# Patient Record
Sex: MALE | Race: WHITE | URBAN - METROPOLITAN AREA
[De-identification: names, ages, dates, MRNs, and addresses within clinical notes are randomized per-mention and may not be internally consistent; named-entity substitution may affect disease eponyms.]

---

## 2019-02-14 ENCOUNTER — OFFICE VISIT (OUTPATIENT)
Dept: INTERNAL MEDICINE CLINIC | Age: 69
End: 2019-02-14
Payer: COMMERCIAL

## 2019-02-14 VITALS
HEIGHT: 74 IN | DIASTOLIC BLOOD PRESSURE: 80 MMHG | HEART RATE: 76 BPM | TEMPERATURE: 97.7 F | OXYGEN SATURATION: 97 % | WEIGHT: 218 LBS | BODY MASS INDEX: 27.98 KG/M2 | SYSTOLIC BLOOD PRESSURE: 130 MMHG

## 2019-02-14 DIAGNOSIS — S49.92XD SHOULDER INJURY, LEFT, SUBSEQUENT ENCOUNTER: ICD-10-CM

## 2019-02-14 DIAGNOSIS — I73.9 PAD (PERIPHERAL ARTERY DISEASE) (HCC): ICD-10-CM

## 2019-02-14 DIAGNOSIS — F17.200 SMOKER: ICD-10-CM

## 2019-02-14 DIAGNOSIS — I10 ESSENTIAL HYPERTENSION: Primary | ICD-10-CM

## 2019-02-14 DIAGNOSIS — Z91.81 AT HIGH RISK FOR FALLS: ICD-10-CM

## 2019-02-14 DIAGNOSIS — E78.00 HYPERCHOLESTEREMIA: ICD-10-CM

## 2019-02-14 PROCEDURE — G8427 DOCREV CUR MEDS BY ELIG CLIN: HCPCS | Performed by: INTERNAL MEDICINE

## 2019-02-14 PROCEDURE — 1123F ACP DISCUSS/DSCN MKR DOCD: CPT | Performed by: INTERNAL MEDICINE

## 2019-02-14 PROCEDURE — 1101F PT FALLS ASSESS-DOCD LE1/YR: CPT | Performed by: INTERNAL MEDICINE

## 2019-02-14 PROCEDURE — G8484 FLU IMMUNIZE NO ADMIN: HCPCS | Performed by: INTERNAL MEDICINE

## 2019-02-14 PROCEDURE — 4004F PT TOBACCO SCREEN RCVD TLK: CPT | Performed by: INTERNAL MEDICINE

## 2019-02-14 PROCEDURE — 4040F PNEUMOC VAC/ADMIN/RCVD: CPT | Performed by: INTERNAL MEDICINE

## 2019-02-14 PROCEDURE — 3017F COLORECTAL CA SCREEN DOC REV: CPT | Performed by: INTERNAL MEDICINE

## 2019-02-14 PROCEDURE — 99203 OFFICE O/P NEW LOW 30 MIN: CPT | Performed by: INTERNAL MEDICINE

## 2019-02-14 PROCEDURE — G8419 CALC BMI OUT NRM PARAM NOF/U: HCPCS | Performed by: INTERNAL MEDICINE

## 2019-02-14 RX ORDER — AMLODIPINE BESYLATE 10 MG/1
10 TABLET ORAL DAILY
Qty: 90 TABLET | Refills: 1 | Status: SHIPPED | OUTPATIENT
Start: 2019-02-14 | End: 2019-08-08 | Stop reason: DRUGHIGH

## 2019-02-14 RX ORDER — LISINOPRIL 20 MG/1
20 TABLET ORAL DAILY
COMMUNITY
End: 2019-02-14 | Stop reason: SDUPTHER

## 2019-02-14 RX ORDER — LISINOPRIL 20 MG/1
20 TABLET ORAL DAILY
Qty: 90 TABLET | Refills: 1 | Status: SHIPPED | OUTPATIENT
Start: 2019-02-14 | End: 2019-11-13 | Stop reason: SDUPTHER

## 2019-02-14 RX ORDER — ATORVASTATIN CALCIUM 80 MG/1
80 TABLET, FILM COATED ORAL DAILY
Qty: 90 TABLET | Refills: 1 | Status: SHIPPED | OUTPATIENT
Start: 2019-02-14 | End: 2020-02-03 | Stop reason: SDUPTHER

## 2019-02-14 RX ORDER — VARENICLINE TARTRATE 1 MG/1
1 TABLET, FILM COATED ORAL 2 TIMES DAILY
Qty: 180 TABLET | Refills: 1 | Status: SHIPPED | OUTPATIENT
Start: 2019-02-14 | End: 2019-08-08

## 2019-02-14 RX ORDER — AMLODIPINE BESYLATE 10 MG/1
10 TABLET ORAL DAILY
COMMUNITY
End: 2019-02-14 | Stop reason: SDUPTHER

## 2019-02-14 RX ORDER — ATORVASTATIN CALCIUM 80 MG/1
80 TABLET, FILM COATED ORAL DAILY
COMMUNITY
End: 2019-02-14 | Stop reason: SDUPTHER

## 2019-02-14 ASSESSMENT — ENCOUNTER SYMPTOMS
ABDOMINAL PAIN: 0
COUGH: 0
NAUSEA: 0
DIARRHEA: 0
BLOOD IN STOOL: 0
BACK PAIN: 1
VOMITING: 0
CONSTIPATION: 0
SHORTNESS OF BREATH: 0
TROUBLE SWALLOWING: 0

## 2019-02-14 ASSESSMENT — PATIENT HEALTH QUESTIONNAIRE - PHQ9
2. FEELING DOWN, DEPRESSED OR HOPELESS: 0
SUM OF ALL RESPONSES TO PHQ QUESTIONS 1-9: 0
SUM OF ALL RESPONSES TO PHQ9 QUESTIONS 1 & 2: 0
SUM OF ALL RESPONSES TO PHQ QUESTIONS 1-9: 0
1. LITTLE INTEREST OR PLEASURE IN DOING THINGS: 0

## 2019-02-20 RX ORDER — FLUOCINOLONE ACETONIDE 0.1 MG/ML
SOLUTION TOPICAL
Qty: 60 ML | Refills: 1 | Status: SHIPPED | OUTPATIENT
Start: 2019-02-20 | End: 2020-08-19 | Stop reason: SDUPTHER

## 2019-08-08 ENCOUNTER — OFFICE VISIT (OUTPATIENT)
Dept: INTERNAL MEDICINE CLINIC | Age: 69
End: 2019-08-08
Payer: COMMERCIAL

## 2019-08-08 VITALS
SYSTOLIC BLOOD PRESSURE: 90 MMHG | RESPIRATION RATE: 16 BRPM | HEART RATE: 64 BPM | TEMPERATURE: 98.2 F | BODY MASS INDEX: 25.91 KG/M2 | DIASTOLIC BLOOD PRESSURE: 60 MMHG | WEIGHT: 201.8 LBS | OXYGEN SATURATION: 96 %

## 2019-08-08 DIAGNOSIS — F17.200 SMOKER: ICD-10-CM

## 2019-08-08 DIAGNOSIS — E78.00 HYPERCHOLESTEREMIA: ICD-10-CM

## 2019-08-08 DIAGNOSIS — I10 ESSENTIAL HYPERTENSION: ICD-10-CM

## 2019-08-08 DIAGNOSIS — Z12.11 COLON CANCER SCREENING: ICD-10-CM

## 2019-08-08 DIAGNOSIS — L73.9 FOLLICULITIS: ICD-10-CM

## 2019-08-08 DIAGNOSIS — Z12.5 PROSTATE CANCER SCREENING: ICD-10-CM

## 2019-08-08 DIAGNOSIS — R39.15 BENIGN PROSTATIC HYPERPLASIA (BPH) WITH URINARY URGENCY: Primary | ICD-10-CM

## 2019-08-08 DIAGNOSIS — R31.29 MICROSCOPIC HEMATURIA: ICD-10-CM

## 2019-08-08 DIAGNOSIS — N40.1 BENIGN PROSTATIC HYPERPLASIA (BPH) WITH URINARY URGENCY: Primary | ICD-10-CM

## 2019-08-08 DIAGNOSIS — Z11.59 ENCOUNTER FOR HEPATITIS C SCREENING TEST FOR LOW RISK PATIENT: ICD-10-CM

## 2019-08-08 LAB
A/G RATIO: 1.6 (ref 1.1–2.2)
ALBUMIN SERPL-MCNC: 4.4 G/DL (ref 3.4–5)
ALP BLD-CCNC: 83 U/L (ref 40–129)
ALT SERPL-CCNC: 10 U/L (ref 10–40)
ANION GAP SERPL CALCULATED.3IONS-SCNC: 18 MMOL/L (ref 3–16)
AST SERPL-CCNC: 14 U/L (ref 15–37)
BILIRUB SERPL-MCNC: 1 MG/DL (ref 0–1)
BILIRUBIN, POC: ABNORMAL
BLOOD URINE, POC: ABNORMAL
BUN BLDV-MCNC: 23 MG/DL (ref 7–20)
CALCIUM SERPL-MCNC: 9.7 MG/DL (ref 8.3–10.6)
CHLORIDE BLD-SCNC: 101 MMOL/L (ref 99–110)
CHOLESTEROL, TOTAL: 160 MG/DL (ref 0–199)
CLARITY, POC: CLEAR
CO2: 21 MMOL/L (ref 21–32)
COLOR, POC: ABNORMAL
CREAT SERPL-MCNC: 1.4 MG/DL (ref 0.8–1.3)
GFR AFRICAN AMERICAN: >60
GFR NON-AFRICAN AMERICAN: 51
GLOBULIN: 2.8 G/DL
GLUCOSE BLD-MCNC: 98 MG/DL (ref 70–99)
GLUCOSE URINE, POC: ABNORMAL
HDLC SERPL-MCNC: 34 MG/DL (ref 40–60)
HEPATITIS C ANTIBODY INTERPRETATION: NORMAL
KETONES, POC: ABNORMAL
LDL CHOLESTEROL CALCULATED: 92 MG/DL
LEUKOCYTE EST, POC: ABNORMAL
NITRITE, POC: ABNORMAL
PH, POC: 6
POTASSIUM SERPL-SCNC: 3.9 MMOL/L (ref 3.5–5.1)
PROTEIN, POC: ABNORMAL
SODIUM BLD-SCNC: 140 MMOL/L (ref 136–145)
SPECIFIC GRAVITY, POC: 1.02
TOTAL PROTEIN: 7.2 G/DL (ref 6.4–8.2)
TRIGL SERPL-MCNC: 172 MG/DL (ref 0–150)
UROBILINOGEN, POC: 0.2
VLDLC SERPL CALC-MCNC: 34 MG/DL

## 2019-08-08 PROCEDURE — 4004F PT TOBACCO SCREEN RCVD TLK: CPT | Performed by: INTERNAL MEDICINE

## 2019-08-08 PROCEDURE — 3017F COLORECTAL CA SCREEN DOC REV: CPT | Performed by: INTERNAL MEDICINE

## 2019-08-08 PROCEDURE — 1123F ACP DISCUSS/DSCN MKR DOCD: CPT | Performed by: INTERNAL MEDICINE

## 2019-08-08 PROCEDURE — 99214 OFFICE O/P EST MOD 30 MIN: CPT | Performed by: INTERNAL MEDICINE

## 2019-08-08 PROCEDURE — 81002 URINALYSIS NONAUTO W/O SCOPE: CPT | Performed by: INTERNAL MEDICINE

## 2019-08-08 PROCEDURE — 4040F PNEUMOC VAC/ADMIN/RCVD: CPT | Performed by: INTERNAL MEDICINE

## 2019-08-08 PROCEDURE — G8427 DOCREV CUR MEDS BY ELIG CLIN: HCPCS | Performed by: INTERNAL MEDICINE

## 2019-08-08 PROCEDURE — G8419 CALC BMI OUT NRM PARAM NOF/U: HCPCS | Performed by: INTERNAL MEDICINE

## 2019-08-08 RX ORDER — NYSTATIN 100000 [USP'U]/G
POWDER TOPICAL
Qty: 60 G | Refills: 0 | Status: SHIPPED | OUTPATIENT
Start: 2019-08-08 | End: 2020-08-19

## 2019-08-08 RX ORDER — SULFAMETHOXAZOLE AND TRIMETHOPRIM 800; 160 MG/1; MG/1
1 TABLET ORAL 2 TIMES DAILY
Qty: 14 TABLET | Refills: 0 | Status: SHIPPED | OUTPATIENT
Start: 2019-08-08 | End: 2019-08-15

## 2019-08-08 RX ORDER — TAMSULOSIN HYDROCHLORIDE 0.4 MG/1
0.4 CAPSULE ORAL DAILY
Qty: 90 CAPSULE | Refills: 1 | Status: SHIPPED | OUTPATIENT
Start: 2019-08-08 | End: 2020-02-03 | Stop reason: SDUPTHER

## 2019-08-08 RX ORDER — TAMSULOSIN HYDROCHLORIDE 0.4 MG/1
0.4 CAPSULE ORAL DAILY
Qty: 90 CAPSULE | Refills: 1 | Status: SHIPPED | OUTPATIENT
Start: 2019-08-08 | End: 2019-08-08 | Stop reason: SDUPTHER

## 2019-08-08 RX ORDER — AMLODIPINE BESYLATE 5 MG/1
5 TABLET ORAL DAILY
Qty: 90 TABLET | Refills: 1 | Status: SHIPPED | OUTPATIENT
Start: 2019-08-08 | End: 2020-02-03 | Stop reason: SDUPTHER

## 2019-08-08 ASSESSMENT — ENCOUNTER SYMPTOMS
DIARRHEA: 0
COUGH: 0
SHORTNESS OF BREATH: 0
NAUSEA: 0
ABDOMINAL PAIN: 0
VOMITING: 0

## 2019-08-08 NOTE — PROGRESS NOTES
SUBJECTIVE:  Dave Vela is a 81 y.o. male being evaluated for:    Chief Complaint   Patient presents with    Urinary Frequency     Started about 2-3 months , Nocturia x6-8 and during the day sometimes has trouble holding his urine.  Sore     started about 2-3 months getting sores along his lower abdomen. stings and itches. HPI   Cannot hold it  Has to pee   NOrmal stream  Getting up 6 to 8 times at night  No dysuria NO stomach or back pain NO nausea or vomiting     Getting sores along lower abdoment for 2 to 3 months  Feels ingrown hairs but will not go away  Pants rub it and working all the time  EIther working or fishing     No Known Allergies  Current Outpatient Medications   Medication Sig Dispense Refill    sulfamethoxazole-trimethoprim (BACTRIM DS;SEPTRA DS) 800-160 MG per tablet Take 1 tablet by mouth 2 times daily for 7 days 14 tablet 0    nystatin (MYCOSTATIN) 932873 UNIT/GM powder Apply 3 times daily. 60 g 0    amLODIPine (NORVASC) 5 MG tablet Take 1 tablet by mouth daily 90 tablet 1    tamsulosin (FLOMAX) 0.4 MG capsule Take 1 capsule by mouth daily 90 capsule 1    fluocinolone acetonide (SYNALAR) 0.01 % external solution Apply topically to affected areas 2 times daily as needed 60 mL 1    aspirin 81 MG tablet Take 8,162 mg by mouth daily      atorvastatin (LIPITOR) 80 MG tablet Take 1 tablet by mouth daily 90 tablet 1    lisinopril (PRINIVIL;ZESTRIL) 20 MG tablet Take 1 tablet by mouth daily 90 tablet 1     No current facility-administered medications for this visit.           Social History     Socioeconomic History    Marital status: Single     Spouse name: Not on file    Number of children: Not on file    Years of education: Not on file    Highest education level: Not on file   Occupational History    Not on file   Social Needs    Financial resource strain: Not on file    Food insecurity:     Worry: Not on file     Inability: Not on file    Transportation needs: 3 times daily. Essential hypertension blood pressure is low lower dose  -     Comprehensive Metabolic Panel  -     amLODIPine (NORVASC) 5 MG tablet; Take 1 tablet by mouth daily    Hypercholesteremia  -     Lipid Panel    Microscopic hematuria  -     Urine Culture    Smoker  Comments:  cutting down Down to 5 a day  Trying hard to quit  Brother recently  of emphysema     Encounter for hepatitis C screening test for low risk patient  -     Hepatitis C Antibody    Colon cancer screening  -     Colrita (For External Results Only); Future        Orders Placed This Encounter   Medications    sulfamethoxazole-trimethoprim (BACTRIM DS;SEPTRA DS) 800-160 MG per tablet     Sig: Take 1 tablet by mouth 2 times daily for 7 days     Dispense:  14 tablet     Refill:  0    nystatin (MYCOSTATIN) 724936 UNIT/GM powder     Sig: Apply 3 times daily. Dispense:  60 g     Refill:  0    DISCONTD: tamsulosin (FLOMAX) 0.4 MG capsule     Sig: Take 1 capsule by mouth daily     Dispense:  90 capsule     Refill:  1    amLODIPine (NORVASC) 5 MG tablet     Sig: Take 1 tablet by mouth daily     Dispense:  90 tablet     Refill:  1    tamsulosin (FLOMAX) 0.4 MG capsule     Sig: Take 1 capsule by mouth daily     Dispense:  90 capsule     Refill:  1        Return in about 6 months (around 2020), or if symptoms worsen or fail to improve. Patient Instructions   Please call your pharmacy if you need any refills of your medication(s). Please call our office at [phone] if you don't hear from us about your test results. Bring an accurate list of your medications with you at every appointment to ensure that we have the correct information.     Our office hours are: Monday - Friday 8:30 am- 5 pm    Phone lines turn on at 8:30 am

## 2019-08-09 LAB — PROSTATE SPECIFIC ANTIGEN: 1.79 NG/ML (ref 0–4)

## 2019-08-10 DIAGNOSIS — N28.9 RENAL INSUFFICIENCY: Primary | ICD-10-CM

## 2019-08-10 LAB — URINE CULTURE, ROUTINE: NORMAL

## 2019-08-12 PROBLEM — E78.00 HYPERCHOLESTEREMIA: Status: ACTIVE | Noted: 2019-08-12

## 2019-08-12 PROBLEM — N40.1 BENIGN PROSTATIC HYPERPLASIA (BPH) WITH URINARY URGENCY: Status: ACTIVE | Noted: 2019-08-12

## 2019-08-12 PROBLEM — F17.200 SMOKER: Status: ACTIVE | Noted: 2019-08-12

## 2019-08-12 PROBLEM — R39.15 BENIGN PROSTATIC HYPERPLASIA (BPH) WITH URINARY URGENCY: Status: ACTIVE | Noted: 2019-08-12

## 2019-08-12 PROBLEM — I10 ESSENTIAL HYPERTENSION: Status: ACTIVE | Noted: 2019-08-12

## 2019-08-28 ENCOUNTER — TELEPHONE (OUTPATIENT)
Dept: INTERNAL MEDICINE CLINIC | Age: 69
End: 2019-08-28

## 2019-09-18 ENCOUNTER — TELEPHONE (OUTPATIENT)
Dept: INTERNAL MEDICINE CLINIC | Age: 69
End: 2019-09-18

## 2019-11-13 DIAGNOSIS — I10 ESSENTIAL HYPERTENSION: ICD-10-CM

## 2019-11-13 RX ORDER — LISINOPRIL 20 MG/1
TABLET ORAL
Qty: 90 TABLET | Refills: 1 | Status: SHIPPED | OUTPATIENT
Start: 2019-11-13 | End: 2020-04-02

## 2020-02-03 RX ORDER — AMLODIPINE BESYLATE 5 MG/1
5 TABLET ORAL DAILY
Qty: 90 TABLET | Refills: 1 | Status: SHIPPED | OUTPATIENT
Start: 2020-02-03 | End: 2020-08-05

## 2020-02-03 RX ORDER — TAMSULOSIN HYDROCHLORIDE 0.4 MG/1
CAPSULE ORAL
Qty: 90 CAPSULE | Refills: 1 | OUTPATIENT
Start: 2020-02-03

## 2020-02-03 RX ORDER — TAMSULOSIN HYDROCHLORIDE 0.4 MG/1
0.4 CAPSULE ORAL DAILY
Qty: 90 CAPSULE | Refills: 1 | Status: SHIPPED | OUTPATIENT
Start: 2020-02-03 | End: 2020-08-04 | Stop reason: SDUPTHER

## 2020-02-03 RX ORDER — ATORVASTATIN CALCIUM 80 MG/1
TABLET, FILM COATED ORAL
Qty: 90 TABLET | Refills: 1 | OUTPATIENT
Start: 2020-02-03

## 2020-02-03 RX ORDER — ATORVASTATIN CALCIUM 80 MG/1
80 TABLET, FILM COATED ORAL DAILY
Qty: 90 TABLET | Refills: 1 | Status: SHIPPED | OUTPATIENT
Start: 2020-02-03 | End: 2020-08-03

## 2020-02-03 RX ORDER — AMLODIPINE BESYLATE 5 MG/1
TABLET ORAL
Qty: 90 TABLET | Refills: 1 | OUTPATIENT
Start: 2020-02-03

## 2020-03-20 ENCOUNTER — TELEPHONE (OUTPATIENT)
Dept: FAMILY MEDICINE CLINIC | Age: 70
End: 2020-03-20

## 2020-03-21 NOTE — TELEPHONE ENCOUNTER
I will put in an order for an arterial test of LE but as long as asymptomatic  It will probably have to wait for all this to be over before it can be done

## 2020-04-02 RX ORDER — LISINOPRIL 20 MG/1
TABLET ORAL
Qty: 90 TABLET | Refills: 1 | Status: SHIPPED | OUTPATIENT
Start: 2020-04-02 | End: 2020-08-05

## 2020-04-16 ENCOUNTER — TELEPHONE (OUTPATIENT)
Dept: FAMILY MEDICINE CLINIC | Age: 70
End: 2020-04-16

## 2020-04-16 RX ORDER — AMOXICILLIN AND CLAVULANATE POTASSIUM 875; 125 MG/1; MG/1
1 TABLET, FILM COATED ORAL 2 TIMES DAILY
Qty: 20 TABLET | Refills: 0 | Status: SHIPPED | OUTPATIENT
Start: 2020-04-16 | End: 2020-04-26

## 2020-04-16 NOTE — TELEPHONE ENCOUNTER
Pt calling has an painful abscess tooth and an awful taste in his mouth. Pt would like an antibiotic called in.  He cant get into his dentist

## 2020-08-03 RX ORDER — ATORVASTATIN CALCIUM 80 MG/1
TABLET, FILM COATED ORAL
Qty: 90 TABLET | Refills: 0 | Status: SHIPPED | OUTPATIENT
Start: 2020-08-03 | End: 2021-05-27

## 2020-08-04 RX ORDER — TAMSULOSIN HYDROCHLORIDE 0.4 MG/1
0.4 CAPSULE ORAL DAILY
Qty: 90 CAPSULE | Refills: 1 | Status: SHIPPED | OUTPATIENT
Start: 2020-08-04 | End: 2021-05-27 | Stop reason: SDUPTHER

## 2020-08-05 RX ORDER — AMLODIPINE BESYLATE 5 MG/1
TABLET ORAL
Qty: 90 TABLET | Refills: 1 | Status: SHIPPED | OUTPATIENT
Start: 2020-08-05 | End: 2020-08-19 | Stop reason: ALTCHOICE

## 2020-08-05 RX ORDER — LISINOPRIL 20 MG/1
TABLET ORAL
Qty: 90 TABLET | Refills: 1 | Status: SHIPPED | OUTPATIENT
Start: 2020-08-05 | End: 2021-05-06 | Stop reason: SDUPTHER

## 2020-08-10 DIAGNOSIS — R39.15 BENIGN PROSTATIC HYPERPLASIA (BPH) WITH URINARY URGENCY: ICD-10-CM

## 2020-08-10 DIAGNOSIS — I10 ESSENTIAL HYPERTENSION: ICD-10-CM

## 2020-08-10 DIAGNOSIS — E78.00 HYPERCHOLESTEREMIA: ICD-10-CM

## 2020-08-10 DIAGNOSIS — N40.1 BENIGN PROSTATIC HYPERPLASIA (BPH) WITH URINARY URGENCY: ICD-10-CM

## 2020-08-10 DIAGNOSIS — Z12.5 PROSTATE CANCER SCREENING: ICD-10-CM

## 2020-08-10 LAB
A/G RATIO: 1.1 (ref 1.1–2.2)
ALBUMIN SERPL-MCNC: 3.9 G/DL (ref 3.4–5)
ALP BLD-CCNC: 101 U/L (ref 40–129)
ALT SERPL-CCNC: 6 U/L (ref 10–40)
ANION GAP SERPL CALCULATED.3IONS-SCNC: 12 MMOL/L (ref 3–16)
AST SERPL-CCNC: 16 U/L (ref 15–37)
BILIRUB SERPL-MCNC: 0.3 MG/DL (ref 0–1)
BUN BLDV-MCNC: 12 MG/DL (ref 7–20)
CALCIUM SERPL-MCNC: 9.5 MG/DL (ref 8.3–10.6)
CHLORIDE BLD-SCNC: 106 MMOL/L (ref 99–110)
CHOLESTEROL, TOTAL: 182 MG/DL (ref 0–199)
CO2: 21 MMOL/L (ref 21–32)
CREAT SERPL-MCNC: 1.2 MG/DL (ref 0.8–1.3)
GFR AFRICAN AMERICAN: >60
GFR NON-AFRICAN AMERICAN: >60
GLOBULIN: 3.6 G/DL
GLUCOSE BLD-MCNC: 96 MG/DL (ref 70–99)
HDLC SERPL-MCNC: 35 MG/DL (ref 40–60)
LDL CHOLESTEROL CALCULATED: 123 MG/DL
POTASSIUM SERPL-SCNC: 4.3 MMOL/L (ref 3.5–5.1)
PROSTATE SPECIFIC ANTIGEN: 1.61 NG/ML (ref 0–4)
SODIUM BLD-SCNC: 139 MMOL/L (ref 136–145)
TOTAL PROTEIN: 7.5 G/DL (ref 6.4–8.2)
TRIGL SERPL-MCNC: 118 MG/DL (ref 0–150)
VLDLC SERPL CALC-MCNC: 24 MG/DL

## 2020-08-19 ENCOUNTER — OFFICE VISIT (OUTPATIENT)
Dept: FAMILY MEDICINE CLINIC | Age: 70
End: 2020-08-19
Payer: COMMERCIAL

## 2020-08-19 VITALS
SYSTOLIC BLOOD PRESSURE: 112 MMHG | OXYGEN SATURATION: 98 % | HEIGHT: 74 IN | HEART RATE: 62 BPM | TEMPERATURE: 97.3 F | BODY MASS INDEX: 26.36 KG/M2 | WEIGHT: 205.4 LBS | DIASTOLIC BLOOD PRESSURE: 70 MMHG

## 2020-08-19 DIAGNOSIS — R53.83 FATIGUE, UNSPECIFIED TYPE: ICD-10-CM

## 2020-08-19 DIAGNOSIS — G62.9 NEUROPATHY: ICD-10-CM

## 2020-08-19 LAB
BASOPHILS ABSOLUTE: 0.1 K/UL (ref 0–0.2)
BASOPHILS RELATIVE PERCENT: 0.5 %
EOSINOPHILS ABSOLUTE: 0.6 K/UL (ref 0–0.6)
EOSINOPHILS RELATIVE PERCENT: 6.1 %
HCT VFR BLD CALC: 43 % (ref 40.5–52.5)
HEMOGLOBIN: 14.8 G/DL (ref 13.5–17.5)
LYMPHOCYTES ABSOLUTE: 2.8 K/UL (ref 1–5.1)
LYMPHOCYTES RELATIVE PERCENT: 30.3 %
MCH RBC QN AUTO: 29.3 PG (ref 26–34)
MCHC RBC AUTO-ENTMCNC: 34.3 G/DL (ref 31–36)
MCV RBC AUTO: 85.4 FL (ref 80–100)
MONOCYTES ABSOLUTE: 0.7 K/UL (ref 0–1.3)
MONOCYTES RELATIVE PERCENT: 7.1 %
NEUTROPHILS ABSOLUTE: 5.2 K/UL (ref 1.7–7.7)
NEUTROPHILS RELATIVE PERCENT: 56 %
PDW BLD-RTO: 14.9 % (ref 12.4–15.4)
PLATELET # BLD: 217 K/UL (ref 135–450)
PMV BLD AUTO: 9.3 FL (ref 5–10.5)
RBC # BLD: 5.03 M/UL (ref 4.2–5.9)
TSH SERPL DL<=0.05 MIU/L-ACNC: 0.7 UIU/ML (ref 0.27–4.2)
WBC # BLD: 9.3 K/UL (ref 4–11)

## 2020-08-19 PROCEDURE — 1123F ACP DISCUSS/DSCN MKR DOCD: CPT | Performed by: INTERNAL MEDICINE

## 2020-08-19 PROCEDURE — G8427 DOCREV CUR MEDS BY ELIG CLIN: HCPCS | Performed by: INTERNAL MEDICINE

## 2020-08-19 PROCEDURE — G8417 CALC BMI ABV UP PARAM F/U: HCPCS | Performed by: INTERNAL MEDICINE

## 2020-08-19 PROCEDURE — 3017F COLORECTAL CA SCREEN DOC REV: CPT | Performed by: INTERNAL MEDICINE

## 2020-08-19 PROCEDURE — 4004F PT TOBACCO SCREEN RCVD TLK: CPT | Performed by: INTERNAL MEDICINE

## 2020-08-19 PROCEDURE — 99214 OFFICE O/P EST MOD 30 MIN: CPT | Performed by: INTERNAL MEDICINE

## 2020-08-19 PROCEDURE — 4040F PNEUMOC VAC/ADMIN/RCVD: CPT | Performed by: INTERNAL MEDICINE

## 2020-08-19 RX ORDER — DICLOFENAC SODIUM 75 MG/1
75 TABLET, DELAYED RELEASE ORAL 2 TIMES DAILY
Qty: 30 TABLET | Refills: 1 | Status: SHIPPED | OUTPATIENT
Start: 2020-08-19 | End: 2021-01-14 | Stop reason: ALTCHOICE

## 2020-08-19 NOTE — PROGRESS NOTES
SUBJECTIVE:  Bessy Echols is a 78 y.o. male being evaluated for:    Chief Complaint   Patient presents with    Check-Up     review labs    Fatigue     request order to check iron    Fall     fell about 2 months ago, c/o left knee pain/swelling intermitted       HPI   Feels week all the time  Feeling tired  Feet hurt all the time  Feel like burning but cold to touch  Left knee is swollen and tender medially  When goes to twist, it pops and he cannot walk on it for 1/2 day  No trauma that he knows of  Occurring for a couple of months     Adaldemetra Santoyo a couple of weeks ago  Tripped and fell  No injury  NO other falling episodes   No Known Allergies  Current Outpatient Medications   Medication Sig Dispense Refill    diclofenac (VOLTAREN) 75 MG EC tablet Take 1 tablet by mouth 2 times daily With food 30 tablet 1    lisinopril (PRINIVIL;ZESTRIL) 20 MG tablet TAKE 1 TABLET EVERY DAY 90 tablet 1    tamsulosin (FLOMAX) 0.4 MG capsule Take 1 capsule by mouth daily 90 capsule 1    atorvastatin (LIPITOR) 80 MG tablet TAKE 1 TABLET EVERY DAY 90 tablet 0    aspirin 81 MG tablet Take 81 mg by mouth daily       vitamin B-12 (CYANOCOBALAMIN) 500 MCG tablet Take 1 tablet by mouth daily 30 tablet 3     No current facility-administered medications for this visit.           Social History     Socioeconomic History    Marital status: Single     Spouse name: Not on file    Number of children: Not on file    Years of education: Not on file    Highest education level: Not on file   Occupational History    Not on file   Social Needs    Financial resource strain: Not on file    Food insecurity     Worry: Not on file     Inability: Not on file    Transportation needs     Medical: Not on file     Non-medical: Not on file   Tobacco Use    Smoking status: Current Every Day Smoker     Packs/day: 0.25     Years: 38.00     Pack years: 9.50     Types: Cigarettes    Smokeless tobacco: Never Used    Tobacco comment: On Chantix and cutting down    Substance and Sexual Activity    Alcohol use: Yes     Comment: 1-2 mixed drinks monthly    Drug use: Not on file    Sexual activity: Not on file   Lifestyle    Physical activity     Days per week: Not on file     Minutes per session: Not on file    Stress: Not on file   Relationships    Social connections     Talks on phone: Not on file     Gets together: Not on file     Attends Christianity service: Not on file     Active member of club or organization: Not on file     Attends meetings of clubs or organizations: Not on file     Relationship status: Not on file    Intimate partner violence     Fear of current or ex partner: Not on file     Emotionally abused: Not on file     Physically abused: Not on file     Forced sexual activity: Not on file   Other Topics Concern    Not on file   Social History Narrative    Not on file      Past Medical History:   Diagnosis Date    Hyperlipidemia     Hypertension     PAD (peripheral artery disease) ([facility] [address]     stenting in left leg      Past Surgical History:   Procedure Laterality Date    CHOLECYSTECTOMY      SHOULDER SURGERY Right     rotator cuff    TOE SURGERY         Review of Systems   Constitutional: Positive for activity change and fatigue. Negative for unexpected weight change. HENT: Negative for congestion. Eyes: Negative for visual disturbance. Respiratory: Negative for cough and shortness of breath. Cardiovascular: Negative for chest pain, palpitations and leg swelling. Gastrointestinal: Negative for abdominal pain, diarrhea, nausea and vomiting. Genitourinary: Positive for urgency. Negative for difficulty urinating. Musculoskeletal: Positive for arthralgias. Skin: Positive for wound. Neurological: Positive for weakness. Negative for dizziness, light-headedness and headaches.         Ending sensation in his feet       OBJECTIVE:  /70   Pulse 62   Temp 97.3 °F (36.3 °C) (Temporal)   Ht 6' 2\" (1.88 m)   Wt 205 lb 6.4 oz (93.2 kg)   SpO2 98%   BMI 26.37 kg/m²      Body mass index is 26.37 kg/m². Physical Exam  Vitals signs and nursing note reviewed. Constitutional:       General: He is not in acute distress. Appearance: Normal appearance. He is well-developed. HENT:      Head: Normocephalic and atraumatic. Right Ear: Tympanic membrane and ear canal normal.      Left Ear: Ear canal normal.      Nose: No congestion. Mouth/Throat:      Pharynx: Oropharynx is clear. Eyes:      Conjunctiva/sclera: Conjunctivae normal.   Neck:      Musculoskeletal: Neck supple. No muscular tenderness. Thyroid: No thyromegaly. Vascular: No carotid bruit or JVD. Cardiovascular:      Rate and Rhythm: Normal rate and regular rhythm. Heart sounds: Normal heart sounds. No murmur. No friction rub. No gallop. Comments: Dorsalis  pedis pulses bilateral feet  Pulmonary:      Effort: Pulmonary effort is normal.      Breath sounds: Normal breath sounds. Chest:      Chest wall: No tenderness. Abdominal:      General: There is no distension. Palpations: Abdomen is soft. Tenderness: There is no abdominal tenderness. Comments: No HSM   Musculoskeletal:         General: Swelling (Effusion of right knee) and tenderness (Medial right knee tenderness) present. Lymphadenopathy:      Cervical: No cervical adenopathy. Skin:     General: Skin is warm and dry. Comments: Pustules around belt line    Neurological:      Mental Status: He is alert and oriented to person, place, and time. Mental status is at baseline. Coordination: Coordination normal.   Psychiatric:         Behavior: Behavior normal.         Thought Content: Thought content normal.         ASSESSMENT/PLAN:    Orlando Dolan was seen today for check-up, fatigue and fall. Diagnoses and all orders for this visit:    Fatigue, unspecified type continue question take his blood pressure pills  -     TSH without Reflex;  Future  -     CBC Auto Differential; Future    Acute pain of right knee with NSAID and ice if not better will refer to Ortho  -     diclofenac (VOLTAREN) 75 MG EC tablet; Take 1 tablet by mouth 2 times daily With food    Neuropathy  -     VITAMIN B12; Future    Essential hypertension trolled with normal labs    Hypercholesteremia cholesterol panel done recently with a cholesterol of 182 and LDL of 123 HDL of 35. To increase exercise and watch diet. To keep meds the same    Benign prostatic hyperplasia (BPH) with urinary urgency better on Flomax        Orders Placed This Encounter   Medications    diclofenac (VOLTAREN) 75 MG EC tablet     Sig: Take 1 tablet by mouth 2 times daily With food     Dispense:  30 tablet     Refill:  1        Return in about 3 months (around 11/19/2020), or if symptoms worsen or fail to improve. There are no Patient Instructions on file for this visit. On the basis of positive falls risk screening, assessment and plan is as follows: in-office gait and balance testing performed using The Timed Up and Go Test was negative for increased falls risk- no further intervention is currently indicated.   But question of knee problem is is adding to it and if not better with treatment will refer to Ortho

## 2020-08-20 LAB — VITAMIN B-12: 305 PG/ML (ref 211–911)

## 2020-08-20 RX ORDER — CHOLECALCIFEROL (VITAMIN D3) 125 MCG
500 CAPSULE ORAL DAILY
Qty: 30 TABLET | Refills: 3 | COMMUNITY
Start: 2020-08-20

## 2020-08-29 ASSESSMENT — ENCOUNTER SYMPTOMS
VOMITING: 0
NAUSEA: 0
COUGH: 0
DIARRHEA: 0
ABDOMINAL PAIN: 0
SHORTNESS OF BREATH: 0

## 2020-10-01 ENCOUNTER — TELEPHONE (OUTPATIENT)
Dept: FAMILY MEDICINE CLINIC | Age: 70
End: 2020-10-01

## 2020-10-01 NOTE — TELEPHONE ENCOUNTER
----- Message from Robin Pantoja MD sent at 9/30/2020  6:57 PM EDT -----  Regarding: screening pad  Screening for PAD shows some possible decreased blood flow to his legs especially on his left.   The pain you are having it seems reasonable to do a Doppler study over at the hospital.  I will order this

## 2020-10-01 NOTE — TELEPHONE ENCOUNTER
Pt advised. Number given to schedule. [facility] but not sure when [facility] is [facility] will check on this also.

## 2021-01-13 ENCOUNTER — TELEPHONE (OUTPATIENT)
Dept: FAMILY MEDICINE CLINIC | Age: 71
End: 2021-01-13

## 2021-01-13 NOTE — TELEPHONE ENCOUNTER
Per Navdeep Simpson pt needs appt for tomorrow and if passing a lot of blood needs to go to er    Lm for pt to return call

## 2021-01-13 NOTE — TELEPHONE ENCOUNTER
Pt broke off a tooth and it is down to the gum, there is a small piece of tooth sticking up. Pt does NOT have a dentist at this time. Pt would like to know if he could be prescribed something for this? Pt is also passing blood thru stool, there is no stool it is just bloody water. Pl advise.    [address]

## 2021-01-14 ENCOUNTER — OFFICE VISIT (OUTPATIENT)
Dept: FAMILY MEDICINE CLINIC | Age: 71
End: 2021-01-14
Payer: MEDICARE

## 2021-01-14 VITALS
WEIGHT: 205 LBS | SYSTOLIC BLOOD PRESSURE: 118 MMHG | HEIGHT: 74 IN | HEART RATE: 68 BPM | DIASTOLIC BLOOD PRESSURE: 76 MMHG | OXYGEN SATURATION: 97 % | BODY MASS INDEX: 26.31 KG/M2 | TEMPERATURE: 97.3 F

## 2021-01-14 DIAGNOSIS — K04.7 DENTAL ABSCESS: ICD-10-CM

## 2021-01-14 DIAGNOSIS — K62.5 RECTAL BLEEDING: Primary | ICD-10-CM

## 2021-01-14 DIAGNOSIS — K62.5 RECTAL BLEEDING: ICD-10-CM

## 2021-01-14 LAB
ANION GAP SERPL CALCULATED.3IONS-SCNC: 8 MMOL/L (ref 3–16)
BASOPHILS ABSOLUTE: 0 K/UL (ref 0–0.2)
BASOPHILS RELATIVE PERCENT: 0.5 %
BUN BLDV-MCNC: 13 MG/DL (ref 7–20)
CALCIUM SERPL-MCNC: 9.6 MG/DL (ref 8.3–10.6)
CHLORIDE BLD-SCNC: 103 MMOL/L (ref 99–110)
CO2: 29 MMOL/L (ref 21–32)
CREAT SERPL-MCNC: 1 MG/DL (ref 0.8–1.3)
EOSINOPHILS ABSOLUTE: 0.2 K/UL (ref 0–0.6)
EOSINOPHILS RELATIVE PERCENT: 3.1 %
GFR AFRICAN AMERICAN: >60
GFR NON-AFRICAN AMERICAN: >60
GLUCOSE BLD-MCNC: 85 MG/DL (ref 70–99)
HCT VFR BLD CALC: 41.3 % (ref 40.5–52.5)
HEMOGLOBIN: 14.1 G/DL (ref 13.5–17.5)
LYMPHOCYTES ABSOLUTE: 2.9 K/UL (ref 1–5.1)
LYMPHOCYTES RELATIVE PERCENT: 38.4 %
MCH RBC QN AUTO: 28.9 PG (ref 26–34)
MCHC RBC AUTO-ENTMCNC: 34.3 G/DL (ref 31–36)
MCV RBC AUTO: 84.3 FL (ref 80–100)
MONOCYTES ABSOLUTE: 0.7 K/UL (ref 0–1.3)
MONOCYTES RELATIVE PERCENT: 8.9 %
NEUTROPHILS ABSOLUTE: 3.6 K/UL (ref 1.7–7.7)
NEUTROPHILS RELATIVE PERCENT: 49.1 %
PDW BLD-RTO: 14.3 % (ref 12.4–15.4)
PLATELET # BLD: 227 K/UL (ref 135–450)
PMV BLD AUTO: 9 FL (ref 5–10.5)
POTASSIUM SERPL-SCNC: 4.1 MMOL/L (ref 3.5–5.1)
RBC # BLD: 4.89 M/UL (ref 4.2–5.9)
SODIUM BLD-SCNC: 140 MMOL/L (ref 136–145)
WBC # BLD: 7.4 K/UL (ref 4–11)

## 2021-01-14 PROCEDURE — G8484 FLU IMMUNIZE NO ADMIN: HCPCS | Performed by: INTERNAL MEDICINE

## 2021-01-14 PROCEDURE — 4004F PT TOBACCO SCREEN RCVD TLK: CPT | Performed by: INTERNAL MEDICINE

## 2021-01-14 PROCEDURE — G8427 DOCREV CUR MEDS BY ELIG CLIN: HCPCS | Performed by: INTERNAL MEDICINE

## 2021-01-14 PROCEDURE — 4040F PNEUMOC VAC/ADMIN/RCVD: CPT | Performed by: INTERNAL MEDICINE

## 2021-01-14 PROCEDURE — 1123F ACP DISCUSS/DSCN MKR DOCD: CPT | Performed by: INTERNAL MEDICINE

## 2021-01-14 PROCEDURE — G8417 CALC BMI ABV UP PARAM F/U: HCPCS | Performed by: INTERNAL MEDICINE

## 2021-01-14 PROCEDURE — 3017F COLORECTAL CA SCREEN DOC REV: CPT | Performed by: INTERNAL MEDICINE

## 2021-01-14 PROCEDURE — 99214 OFFICE O/P EST MOD 30 MIN: CPT | Performed by: INTERNAL MEDICINE

## 2021-01-14 RX ORDER — AMOXICILLIN AND CLAVULANATE POTASSIUM 875; 125 MG/1; MG/1
1 TABLET, FILM COATED ORAL 2 TIMES DAILY
Qty: 20 TABLET | Refills: 0 | Status: SHIPPED | OUTPATIENT
Start: 2021-01-14 | End: 2021-01-24

## 2021-01-14 SDOH — ECONOMIC STABILITY: FOOD INSECURITY: WITHIN THE PAST 12 MONTHS, YOU WORRIED THAT YOUR FOOD WOULD RUN OUT BEFORE YOU GOT MONEY TO BUY MORE.: NEVER TRUE

## 2021-01-14 SDOH — ECONOMIC STABILITY: TRANSPORTATION INSECURITY
IN THE PAST 12 MONTHS, HAS THE LACK OF TRANSPORTATION KEPT YOU FROM MEDICAL APPOINTMENTS OR FROM GETTING MEDICATIONS?: NO

## 2021-01-14 ASSESSMENT — PATIENT HEALTH QUESTIONNAIRE - PHQ9
1. LITTLE INTEREST OR PLEASURE IN DOING THINGS: 0
SUM OF ALL RESPONSES TO PHQ QUESTIONS 1-9: 0
SUM OF ALL RESPONSES TO PHQ QUESTIONS 1-9: 0
2. FEELING DOWN, DEPRESSED OR HOPELESS: 0
SUM OF ALL RESPONSES TO PHQ QUESTIONS 1-9: 0

## 2021-01-14 NOTE — PATIENT INSTRUCTIONS
Patient Education        Lower Gastrointestinal Bleeding: Care Instructions  Your Care Instructions     The digestive or gastrointestinal tract goes from the mouth to the anus. It is often called the GI tract. Bleeding in the lower GI tract can happen anywhere in your small or large intestine. It can also happen in your rectum or anus. In some cases, it is caused by an infection, cancer, or inflammatory bowel disease. Or it may be caused by hemorrhoids, diverticulitis, or clotting problems. Light bleeding may not cause any symptoms at first. But if you continue to bleed for a while, you may feel very weak or tired. Sudden, heavy bleeding means you need to see a doctor right away. This kind of bleeding can be very dangerous. But it can usually be cured or controlled. The doctor may do some tests to find the cause of your bleeding. Follow-up care is a key part of your treatment and safety. Be sure to make and go to all appointments, and call your doctor if you are having problems. It's also a good idea to know your test results and keep a list of the medicines you take. How can you care for yourself at home? · Be safe with medicines. Take your medicines exactly as prescribed. Call your doctor if you think you are having a problem with your medicine. You will get more details on the specific medicines your doctor prescribes. · Do not take aspirin or other anti-inflammatory medicines, such as naproxen (Aleve) or ibuprofen (Advil, Motrin), without talking to your doctor first. Ask your doctor if it is okay to use acetaminophen (Tylenol). · Do not drink alcohol. · The bleeding may make you lose iron. So it's important to eat foods that have a lot of iron. These include red meat, shellfish, poultry, and eggs. They also include beans, raisins, whole-grain breads, and leafy green vegetables. If you want help planning meals, you can meet with a dietitian. When should you call for help? Call 911 anytime you think you may need emergency care. For example, call if:    · You have sudden, severe belly pain.     · You vomit blood or what looks like coffee grounds.     · You passed out (lost consciousness).     · Your stools are maroon or very bloody. Call your doctor now or seek immediate medical care if:    · You are dizzy or lightheaded, or you feel like you may faint.     · Your stools are black and look like tar, or they have streaks of blood.     · You have belly pain.     · You vomit or have nausea. Watch closely for changes in your health, and be sure to contact your doctor if you do not get better as expected. Where can you learn more? Go to https://[employer]. org and sign in to your [employer] account. Enter D858 in the [employer] box to learn more about \"Lower Gastrointestinal Bleeding: Care Instructions. \"     If you do not have an account, please click on the \"Sign Up Now\" link. Current as of: June 26, 2019               Content Version: 12.6  © 9133-5589 [employer]. Care instructions adapted under license by [facility] ([facility]). If you have questions about a medical condition or this instruction, always ask your healthcare professional. [address] any warranty or liability for your use of this information.

## 2021-01-14 NOTE — PROGRESS NOTES
SUBJECTIVE:  Therese Knutson is a 78 y.o. male being evaluated for:    Chief Complaint   Patient presents with    Rectal Bleeding     bright red bloody stools with bm x 5 days, today light red    Dental Pain     piece of tooth broke down to the gum line started couple years ago, started with pain  x 1 week       HPI   Rectal bleeding really bad Sunday and Monday  And Tuesday  Now better  Bright red blood Liquid out on Sunday stomach pain diffusely No nausea or vomiting  NO diarrhea   Tooth infection  Broke off into his mough  Feverish and chills  Pain in his left cheek area   Able to drink      No Known Allergies  Current Outpatient Medications   Medication Sig Dispense Refill    amoxicillin-clavulanate (AUGMENTIN) 875-125 MG per tablet Take 1 tablet by mouth 2 times daily for 10 days 20 tablet 0    vitamin B-12 (CYANOCOBALAMIN) 500 MCG tablet Take 1 tablet by mouth daily 30 tablet 3    lisinopril (PRINIVIL;ZESTRIL) 20 MG tablet TAKE 1 TABLET EVERY DAY 90 tablet 1    tamsulosin (FLOMAX) 0.4 MG capsule Take 1 capsule by mouth daily 90 capsule 1    atorvastatin (LIPITOR) 80 MG tablet TAKE 1 TABLET EVERY DAY 90 tablet 0    aspirin 81 MG tablet Take 81 mg by mouth every other day        No current facility-administered medications for this visit.           Social History     Socioeconomic History    Marital status: Single     Spouse name: Not on file    Number of children: Not on file    Years of education: Not on file    Highest education level: Not on file   Occupational History    Not on file   Social Needs    Financial resource strain: Not hard at all    Food insecurity     Worry: Never true     Inability: Never true   [employer] needs     Medical: No     Non-medical: No   Tobacco Use    Smoking status: Current Every Day Smoker     Packs/day: 0.25     Years: 38.00     Pack years: 9.50     Types: Cigarettes    Smokeless tobacco: Never Used    Tobacco comment: cutting down   Substance and Sexual Activity    Alcohol use: Yes     Comment: 1-2 mixed drinks monthly    Drug use: Not on file    Sexual activity: Not on file   Lifestyle    Physical activity     Days per week: Not on file     Minutes per session: Not on file    Stress: Not on file   Relationships    Social connections     Talks on phone: Not on file     Gets together: Not on file     Attends Jewish service: Not on file     Active member of club or organization: Not on file     Attends meetings of clubs or organizations: Not on file     Relationship status: Not on file    Intimate partner violence     Fear of current or ex partner: Not on file     Emotionally abused: Not on file     Physically abused: Not on file     Forced sexual activity: Not on file   Other Topics Concern    Not on file   Social History Narrative    Not on file      Past Medical History:   Diagnosis Date    Hyperlipidemia     Hypertension     PAD (peripheral artery disease) ([facility] [address]     stenting in left leg      Past Surgical History:   Procedure Laterality Date    CHOLECYSTECTOMY      SHOULDER SURGERY Right     rotator cuff    TOE SURGERY         Review of Systems   Constitutional: Positive for chills and fever. HENT: Positive for dental problem. Negative for nosebleeds. Respiratory: Negative for cough and shortness of breath. Cardiovascular: Negative for chest pain, palpitations and leg swelling. Gastrointestinal: Positive for anal bleeding and blood in stool. Negative for abdominal pain, constipation, diarrhea, nausea and vomiting. Genitourinary: Negative for difficulty urinating and dysuria. Neurological: Negative for dizziness, light-headedness and headaches. OBJECTIVE:  /76   Pulse 68   Temp 97.3 °F (36.3 °C) (Temporal)   Ht 6' 2\" (1.88 m)   Wt 205 lb (93 kg)   SpO2 97%   BMI 26.32 kg/m²      Body mass index is 26.32 kg/m². Physical Exam  Vitals signs and nursing note reviewed.    Constitutional:       General: He is not in acute distress. Appearance: Normal appearance. He is well-developed. HENT:      Head: Normocephalic and atraumatic. Right Ear: Tympanic membrane and ear canal normal.      Left Ear: Tympanic membrane and ear canal normal.      Nose: No congestion. Mouth/Throat:      Pharynx: Oropharynx is clear. Comments: Gum infection teeth broken off. Posterior molars on the left  Eyes:      Conjunctiva/sclera: Conjunctivae normal.   Neck:      Musculoskeletal: Neck supple. No muscular tenderness. Thyroid: No thyromegaly. Vascular: No carotid bruit or JVD. Cardiovascular:      Rate and Rhythm: Normal rate and regular rhythm. Heart sounds: Normal heart sounds. No murmur. No friction rub. No gallop. Comments: Dorsalis  pedis pulses bilateral feet  Pulmonary:      Effort: Pulmonary effort is normal.      Breath sounds: Normal breath sounds. Chest:      Chest wall: No tenderness. Abdominal:      General: There is no distension. Palpations: Abdomen is soft. Tenderness: There is no abdominal tenderness. Comments: No HSM   Musculoskeletal:         General: Swelling (Effusion of right knee) and tenderness (Medial right knee tenderness) present. Lymphadenopathy:      Cervical: No cervical adenopathy. Skin:     General: Skin is warm and dry. Comments: Pustules around belt line    Neurological:      Mental Status: He is alert and oriented to person, place, and time. Mental status is at baseline. Coordination: Coordination normal.   Psychiatric:         Behavior: Behavior normal.         Thought Content: Thought content normal.         ASSESSMENT/PLAN:    Radames Monge was seen today for rectal bleeding and dental pain. Diagnoses and all orders for this visit:    Rectal bleeding needs to follow-up with GI  -     Cancel: Basic Metabolic Panel; Future  -     Cancel: CBC Auto Differential; Future  -     Basic Metabolic Panel;  Future  -     CBC Auto Differential; Future    Dental abscess needs to see dentist  -     Cancel: CBC Auto Differential; Future  -     CBC Auto Differential; Future  -     amoxicillin-clavulanate (AUGMENTIN) 875-125 MG per tablet; Take 1 tablet by mouth 2 times daily for 10 days        Orders Placed This Encounter   Medications    amoxicillin-clavulanate (AUGMENTIN) 875-125 MG per tablet     Sig: Take 1 tablet by mouth 2 times daily for 10 days     Dispense:  20 tablet     Refill:  0        Return if symptoms worsen or fail to improve. Patient Instructions       Patient Education        Lower Gastrointestinal Bleeding: Care Instructions  Your Care Instructions     The digestive or gastrointestinal tract goes from the mouth to the anus. It is often called the GI tract. Bleeding in the lower GI tract can happen anywhere in your small or large intestine. It can also happen in your rectum or anus. In some cases, it is caused by an infection, cancer, or inflammatory bowel disease. Or it may be caused by hemorrhoids, diverticulitis, or clotting problems. Light bleeding may not cause any symptoms at first. But if you continue to bleed for a while, you may feel very weak or tired. Sudden, heavy bleeding means you need to see a doctor right away. This kind of bleeding can be very dangerous. But it can usually be cured or controlled. The doctor may do some tests to find the cause of your bleeding. Follow-up care is a key part of your treatment and safety. Be sure to make and go to all appointments, and call your doctor if you are having problems. It's also a good idea to know your test results and keep a list of the medicines you take. How can you care for yourself at home? · Be safe with medicines. Take your medicines exactly as prescribed. Call your doctor if you think you are having a problem with your medicine. You will get more details on the specific medicines your doctor prescribes.   · Do not take aspirin or other anti-inflammatory medicines, such as naproxen (Aleve) or ibuprofen (Advil, Motrin), without talking to your doctor first. Ask your doctor if it is okay to use acetaminophen (Tylenol). · Do not drink alcohol. · The bleeding may make you lose iron. So it's important to eat foods that have a lot of iron. These include red meat, shellfish, poultry, and eggs. They also include beans, raisins, whole-grain breads, and leafy green vegetables. If you want help planning meals, you can meet with a dietitian. When should you call for help? Call 911 anytime you think you may need emergency care. For example, call if:    · You have sudden, severe belly pain.     · You vomit blood or what looks like coffee grounds.     · You passed out (lost consciousness).     · Your stools are maroon or very bloody. Call your doctor now or seek immediate medical care if:    · You are dizzy or lightheaded, or you feel like you may faint.     · Your stools are black and look like tar, or they have streaks of blood.     · You have belly pain.     · You vomit or have nausea. Watch closely for changes in your health, and be sure to contact your doctor if you do not get better as expected. Where can you learn more? Go to https://Metrekare.LineHop. org and sign in to your [employer] account. Enter N426 in the [facility] box to learn more about \"Lower Gastrointestinal Bleeding: Care Instructions. \"     If you do not have an account, please click on the \"Sign Up Now\" link. Current as of: June 26, 2019               Content Version: 12.6  © 5778-4657 [employer], Incorporated. Care instructions adapted under license by [facility] ([facility]). If you have questions about a medical condition or this instruction, always ask your healthcare professional. [address] any warranty or liability for your use of this information.

## 2021-01-23 ASSESSMENT — ENCOUNTER SYMPTOMS
VOMITING: 0
ABDOMINAL PAIN: 0
NAUSEA: 0
DIARRHEA: 0
CONSTIPATION: 0
SHORTNESS OF BREATH: 0
ANAL BLEEDING: 1
COUGH: 0
BLOOD IN STOOL: 1

## 2021-04-29 ENCOUNTER — TELEPHONE (OUTPATIENT)
Dept: FAMILY MEDICINE CLINIC | Age: 71
End: 2021-04-29

## 2021-04-29 DIAGNOSIS — I10 ESSENTIAL HYPERTENSION: ICD-10-CM

## 2021-04-29 NOTE — TELEPHONE ENCOUNTER
Lm for pt to return call. See if pt has an address and ph # for the [facility], need to make sure we are sending to correct one.

## 2021-04-30 NOTE — TELEPHONE ENCOUNTER
Patient calling back the phone number for [facility] is [phone] , address is [address] [place] [address]

## 2021-05-06 RX ORDER — LISINOPRIL 20 MG/1
TABLET ORAL
Qty: 90 TABLET | Refills: 3 | Status: SHIPPED | OUTPATIENT
Start: 2021-05-06 | End: 2022-04-18 | Stop reason: SDUPTHER

## 2021-05-27 ENCOUNTER — OFFICE VISIT (OUTPATIENT)
Dept: FAMILY MEDICINE CLINIC | Age: 71
End: 2021-05-27
Payer: MEDICARE

## 2021-05-27 VITALS
BODY MASS INDEX: 27.11 KG/M2 | SYSTOLIC BLOOD PRESSURE: 118 MMHG | TEMPERATURE: 97.6 F | HEIGHT: 74 IN | OXYGEN SATURATION: 95 % | WEIGHT: 211.2 LBS | DIASTOLIC BLOOD PRESSURE: 80 MMHG | HEART RATE: 59 BPM

## 2021-05-27 DIAGNOSIS — K02.9 TOOTH DECAY: ICD-10-CM

## 2021-05-27 DIAGNOSIS — E78.00 HYPERCHOLESTEREMIA: ICD-10-CM

## 2021-05-27 DIAGNOSIS — R39.15 BENIGN PROSTATIC HYPERPLASIA (BPH) WITH URINARY URGENCY: ICD-10-CM

## 2021-05-27 DIAGNOSIS — Z12.5 PROSTATE CANCER SCREENING: ICD-10-CM

## 2021-05-27 DIAGNOSIS — F17.200 SMOKER: ICD-10-CM

## 2021-05-27 DIAGNOSIS — N40.1 BENIGN PROSTATIC HYPERPLASIA (BPH) WITH URINARY URGENCY: ICD-10-CM

## 2021-05-27 DIAGNOSIS — I10 ESSENTIAL HYPERTENSION: Primary | ICD-10-CM

## 2021-05-27 DIAGNOSIS — I10 ESSENTIAL HYPERTENSION: ICD-10-CM

## 2021-05-27 LAB
A/G RATIO: 1.5 (ref 1.1–2.2)
ALBUMIN SERPL-MCNC: 4.2 G/DL (ref 3.4–5)
ALP BLD-CCNC: 95 U/L (ref 40–129)
ALT SERPL-CCNC: 10 U/L (ref 10–40)
ANION GAP SERPL CALCULATED.3IONS-SCNC: 10 MMOL/L (ref 3–16)
AST SERPL-CCNC: 17 U/L (ref 15–37)
BILIRUB SERPL-MCNC: 0.4 MG/DL (ref 0–1)
BUN BLDV-MCNC: 12 MG/DL (ref 7–20)
CALCIUM SERPL-MCNC: 9.8 MG/DL (ref 8.3–10.6)
CHLORIDE BLD-SCNC: 107 MMOL/L (ref 99–110)
CHOLESTEROL, TOTAL: 200 MG/DL (ref 0–199)
CO2: 25 MMOL/L (ref 21–32)
CREAT SERPL-MCNC: 1.1 MG/DL (ref 0.8–1.3)
GFR AFRICAN AMERICAN: >60
GFR NON-AFRICAN AMERICAN: >60
GLOBULIN: 2.8 G/DL
GLUCOSE BLD-MCNC: 97 MG/DL (ref 70–99)
HDLC SERPL-MCNC: 32 MG/DL (ref 40–60)
LDL CHOLESTEROL CALCULATED: 133 MG/DL
POTASSIUM SERPL-SCNC: 4.5 MMOL/L (ref 3.5–5.1)
PROSTATE SPECIFIC ANTIGEN: 1.41 NG/ML (ref 0–4)
SODIUM BLD-SCNC: 142 MMOL/L (ref 136–145)
TOTAL PROTEIN: 7 G/DL (ref 6.4–8.2)
TRIGL SERPL-MCNC: 176 MG/DL (ref 0–150)
VLDLC SERPL CALC-MCNC: 35 MG/DL

## 2021-05-27 PROCEDURE — 99214 OFFICE O/P EST MOD 30 MIN: CPT | Performed by: INTERNAL MEDICINE

## 2021-05-27 RX ORDER — TAMSULOSIN HYDROCHLORIDE 0.4 MG/1
0.4 CAPSULE ORAL DAILY
Qty: 90 CAPSULE | Refills: 1 | Status: SHIPPED | OUTPATIENT
Start: 2021-05-27 | End: 2022-04-18 | Stop reason: SDUPTHER

## 2021-05-27 RX ORDER — VARENICLINE TARTRATE
KIT
Qty: 1 BOX | Refills: 0 | Status: SHIPPED | OUTPATIENT
Start: 2021-05-27 | End: 2022-04-18 | Stop reason: RX

## 2021-05-27 NOTE — PROGRESS NOTES
SUBJECTIVE:  Reji Borden is a 78 y.o. male being evaluated for:    Chief Complaint   Patient presents with    6 Month Follow-Up       HPI   No more rectal bleeding   Still with tooth problem  Cannot find anyone to take his teeth out   Just want teeth pulled  NO injection and is stable   Gets up 3 to 4 nights to use the restroom   No Known Allergies  Current Outpatient Medications   Medication Sig Dispense Refill    varenicline (CHANTIX STARTING MONTH HARSHAL) 0.5 MG X 11 & 1 MG X 42 tablet Take by mouth. 1 box 0    tamsulosin (FLOMAX) 0.4 MG capsule Take 1 capsule by mouth daily 90 capsule 1    lisinopril (PRINIVIL;ZESTRIL) 20 MG tablet TAKE 1 TABLET EVERY DAY 90 tablet 3    vitamin B-12 (CYANOCOBALAMIN) 500 MCG tablet Take 1 tablet by mouth daily 30 tablet 3    aspirin 81 MG tablet Take 81 mg by mouth every other day  (Patient not taking: Reported on 5/27/2021)       No current facility-administered medications for this visit.          Social History     Socioeconomic History    Marital status: Single     Spouse name: Not on file    Number of children: Not on file    Years of education: Not on file    Highest education level: Not on file   Occupational History    Not on file   Tobacco Use    Smoking status: Current Every Day Smoker     Packs/day: 0.25     Years: 38.00     Pack years: 9.50     Types: Cigarettes    Smokeless tobacco: Never Used    Tobacco comment: cutting down   Vaping Use    Vaping Use: Never used   Substance and Sexual Activity    Alcohol use: Yes     Comment: 1-2 mixed drinks monthly    Drug use: Not on file    Sexual activity: Not on file   Other Topics Concern    Not on file   Social History Narrative    Not on file     Social Determinants of Health     Financial Resource Strain: Low Risk     Difficulty of Paying Living Expenses: Not hard at all   Food Insecurity: No Food Insecurity    Worried About [address] in the Last Year: Never true    [address] in well-developed. HENT:      Head: Normocephalic and atraumatic. Mouth/Throat:      Pharynx: Oropharynx is clear. Comments: Teeth in poor repair   Eyes:      Conjunctiva/sclera: Conjunctivae normal.   Neck:      Thyroid: No thyromegaly. Vascular: No carotid bruit or JVD. Cardiovascular:      Rate and Rhythm: Normal rate and regular rhythm. Heart sounds: Normal heart sounds. No murmur heard. No friction rub. No gallop. Comments: Dorsalis  pedis pulses bilateral feet  Pulmonary:      Effort: Pulmonary effort is normal.      Breath sounds: Normal breath sounds. Chest:      Chest wall: No tenderness. Abdominal:      General: There is no distension. Palpations: Abdomen is soft. Tenderness: There is no abdominal tenderness. Comments: No HSM   Musculoskeletal:         General: No swelling. Cervical back: Neck supple. No muscular tenderness. Lymphadenopathy:      Cervical: No cervical adenopathy. Skin:     General: Skin is warm and dry. Neurological:      General: No focal deficit present. Mental Status: He is alert. Gait: Gait normal.   Psychiatric:         Behavior: Behavior normal.         Thought Content: Thought content normal.         ASSESSMENT/PLAN:    Mikie Arroyo was seen today for 6 month follow-up. Diagnoses and all orders for this visit:    Essential hypertension  Comments:  controlled   Orders:  -     Comprehensive Metabolic Panel; Future  -     Lipid Panel; Future    Hypercholesteremia  Comments:  not taking the medication   Orders:  -     Lipid Panel; Future    Benign prostatic hyperplasia (BPH) with urinary urgency  -     tamsulosin (FLOMAX) 0.4 MG capsule; Take 1 capsule by mouth daily    Tooth decay  Comments:  given dental numbers     Smoker  -     varenicline (CHANTIX STARTING MONTH PAK) 0.5 MG X 11 & 1 MG X 42 tablet; Take by mouth. Prostate cancer screening  -     PSA screening;  Future          Orders Placed This Encounter Medications    varenicline (CHANTIX STARTING MONTH HARSHAL) 0.5 MG X 11 & 1 MG X 42 tablet     Sig: Take by mouth. Dispense:  1 box     Refill:  0    tamsulosin (FLOMAX) 0.4 MG capsule     Sig: Take 1 capsule by mouth daily     Dispense:  90 capsule     Refill:  1        Return in about 6 months (around 11/27/2021), or if symptoms worsen or fail to improve, for medicare wellness . Patient Instructions     Patient Education        Stopping Smoking: Care Instructions  Your Care Instructions     Cigarette smokers crave the nicotine in cigarettes. Giving it up is much harder than simply changing a habit. Your body has to stop craving the nicotine. It is hard to quit, but you can do it. There are many tools that people use to quit smoking. You may find that combining tools works best for you. There are several steps to quitting. First you get ready to quit. Then you get support to help you. After that, you learn new skills and behaviors to become a nonsmoker. For many people, a necessary step is getting and using medicine. Your doctor will help you set up the plan that best meets your needs. You may want to attend a smoking cessation program to help you quit smoking. When you choose a program, look for one that has proven success. Ask your doctor for ideas. You will greatly increase your chances of success if you take medicine as well as get counseling or join a cessation program.  Some of the changes you feel when you first quit tobacco are uncomfortable. Your body will miss the nicotine at first, and you may feel short-tempered and grumpy. You may have trouble sleeping or concentrating. Medicine can help you deal with these symptoms. You may struggle with changing your smoking habits and rituals. The last step is the tricky one: Be prepared for the smoking urge to continue for a time. This is a lot to deal with, but keep at it. You will feel better. Follow-up care is a key part of your treatment and safety.  Be An inhaler has a neumann that contains nicotine. You breathe in a puff of nicotine vapor through your mouth and throat. · Nasal spray releases a mist that contains nicotine. What are key points about this decision? · Using medicines can double your chances of quitting smoking. They can ease cravings and withdrawal symptoms. · Getting counseling along with using medicine can raise your chances of quitting even more. · If you smoke fewer than 5 cigarettes a day, you may not need medicines to help you quit smoking. · These medicines have less nicotine than cigarettes. And by itself, nicotine is not nearly as harmful as smoking. The tars, carbon monoxide, and other toxic chemicals in tobacco cause the harmful effects. · The side effects of nicotine replacement products depend on the type of product. For example, a patch can make your skin red and itchy. Medicines in pill form can make you sick to your stomach. They can also cause dry mouth and trouble sleeping. For most people, the side effects are not bad enough to make them stop using the products. Why might you choose to use medicines to quit smoking? · You have tried on your own to stop smoking, but you were not able to stop. · You smoke more than 5 cigarettes a day. · You want to increase your chances of quitting smoking. · You want to reduce your cravings and withdrawal symptoms. · You feel the benefits of medicine outweigh the side effects. Why might you choose not to use medicine? · You want to try quitting on your own by stopping all at once (\"cold turkey\"). · You want to cut back slowly on the number of cigarettes you smoke. · You smoke fewer than 5 cigarettes a day. · You do not like using medicine. · You feel the side effects of medicines outweigh the benefits. · You are worried about the cost of medicines. Your decision  Thinking about the facts and your feelings can help you make a decision that is right for you.  Be sure you understand the benefits and risks of your options, and think about what else you need to do before you make the decision. Where can you learn more? Go to https://chpepiceweb.[email or web address] org and sign in to your ExoYou account. Enter Q198 in the [facility] box to learn more about \"Deciding About Using Medicines To Quit Smoking. \"     If you do not have an account, please click on the \"Sign Up Now\" link. Current as of: March 12, 2020               Content Version: 12.8  © 8592-5207 Healthwise, Incorporated. Care instructions adapted under license by [facility] ([facility]). If you have questions about a medical condition or this instruction, always ask your healthcare professional. [address] any warranty or liability for your use of this information.

## 2021-05-27 NOTE — PATIENT INSTRUCTIONS
are trying to quit smoking. · Consider signing up for a smoking cessation program, such as the American Lung Association's Freedom from Smoking program.  · Get text messaging support. Go to the website at www.smokefree. gov to sign up for the [facility] program.  · Set a quit date. Pick your date carefully so that it is not right in the middle of a big deadline or stressful time. Once you quit, do not even take a puff. Get rid of all ashtrays and lighters after your last cigarette. Clean your house and your clothes so that they do not smell of smoke. · Learn how to be a nonsmoker. Think about ways you can avoid those things that make you reach for a cigarette. ? Avoid situations that put you at greatest risk for smoking. For some people, it is hard to have a drink with friends without smoking. For others, they might skip a coffee break with coworkers who smoke. ? Change your daily routine. Take a different route to work or eat a meal in a different place. · Cut down on stress. Calm yourself or release tension by doing an activity you enjoy, such as reading a book, taking a hot bath, or gardening. · Talk to your doctor or pharmacist about nicotine replacement therapy, which replaces the nicotine in your body. You still get nicotine but you do not use tobacco. Nicotine replacement products help you slowly reduce the amount of nicotine you need. These products come in several forms, many of them available over-the-counter:  ? Nicotine patches  ? Nicotine gum and lozenges  ? Nicotine inhaler  · Ask your doctor about bupropion (Wellbutrin) or varenicline (Chantix), which are prescription medicines. They do not contain nicotine. They help you by reducing withdrawal symptoms, such as stress and anxiety. · Some people find hypnosis, acupuncture, and massage helpful for ending the smoking habit. · Eat a healthy diet and get regular exercise.  Having healthy habits will help your body move past its craving for nicotine. · Be prepared to keep trying. Most people are not successful the first few times they try to quit. Do not get mad at yourself if you smoke again. Make a list of things you learned and think about when you want to try again, such as next week, next month, or next year. Where can you learn more? Go to https://chpearmandoewrebeca.Sribu. org and sign in to your [employer] account. Enter K178 in the [facility] box to learn more about \"Stopping Smoking: Care Instructions. \"     If you do not have an account, please click on the \"Sign Up Now\" link. Current as of: March 12, 2020               Content Version: 12.8  © 2006-2021 Healthwise, Screwpulp. Care instructions adapted under license by [facility] ([facility]). If you have questions about a medical condition or this instruction, always ask your healthcare professional. [place] [address] any warranty or liability for your use of this information. Patient Education        Deciding About Using Medicines To Quit Smoking  How can you decide about using medicines to quit smoking? What are the medicines you can use? Your doctor may prescribe varenicline (Chantix) or bupropion (Zyban). These medicines can help you cope with cravings for tobacco. They are pills that don't contain nicotine. You also can use nicotine replacement products. These do contain nicotine. There are many types. · Gum and lozenges slowly release nicotine into your mouth. · Patches stick to your skin. They slowly release nicotine into your bloodstream.  · An inhaler has a neumann that contains nicotine. You breathe in a puff of nicotine vapor through your mouth and throat. · Nasal spray releases a mist that contains nicotine. What are key points about this decision? · Using medicines can double your chances of quitting smoking. They can ease cravings and withdrawal symptoms.   · Getting counseling along with using medicine can raise your chances of Up Now\" link. Current as of: March 12, 2020               Content Version: 12.8  © 9993-5948 Healthwise, Incorporated. Care instructions adapted under license by [facility] ([facility]). If you have questions about a medical condition or this instruction, always ask your healthcare professional. [address] any warranty or liability for your use of this information.

## 2021-06-04 ASSESSMENT — ENCOUNTER SYMPTOMS
ABDOMINAL PAIN: 0
CONSTIPATION: 0
BLOOD IN STOOL: 0
SHORTNESS OF BREATH: 0
DIARRHEA: 0
VOMITING: 0
COUGH: 0
ANAL BLEEDING: 0
NAUSEA: 0

## 2021-07-22 ENCOUNTER — TELEPHONE (OUTPATIENT)
Dept: FAMILY MEDICINE CLINIC | Age: 71
End: 2021-07-22

## 2021-07-22 NOTE — TELEPHONE ENCOUNTER
Received an Eye Exam on the pt from Dr Beauty Aschoff, she notes that he had a strong chronic cough during his entire time in the office. She asked him if had been switched to lisinorpil and he said yes. Please evaluate if adverse effect. Called the pt to see if he wanted to schedule an appt to discuss with Dr Candis Paget. Pt said will call back to schedule an appt.

## 2021-08-10 ENCOUNTER — TELEPHONE (OUTPATIENT)
Dept: FAMILY MEDICINE CLINIC | Age: 71
End: 2021-08-10

## 2021-08-10 NOTE — TELEPHONE ENCOUNTER
Lm for pt letting him know that there was a recall on Chantix and he should call his pharmacy with any questions.

## 2022-01-05 ENCOUNTER — TELEPHONE (OUTPATIENT)
Dept: FAMILY MEDICINE CLINIC | Age: 72
End: 2022-01-05

## 2022-01-05 NOTE — TELEPHONE ENCOUNTER
Pt stated that he has a stent in his left leg, hip and back pain, and he has trouble with walking.      Pl advise  [phone] (home)

## 2022-01-05 NOTE — LETTER
[employer] OF [facility] AND [facility]  [address] [facility] [address]  Phone: [phone]  Fax: [phone]    Melany García MD         January 5, 2022     Patient: Alexey Wells Sr   YOB: 1950   Date of Visit: 1/5/2022       To Whom It May Concern: It is my medical opinion that Siva Basilio requires a disability parking placard for the following reasons:  He has limited walking ability due to a vascular condition. Duration of need: permanent    If you have any questions or concerns, please don't hesitate to call.     Sincerely,        Melany García MD

## 2022-04-18 ENCOUNTER — OFFICE VISIT (OUTPATIENT)
Dept: FAMILY MEDICINE CLINIC | Age: 72
End: 2022-04-18
Payer: MEDICARE

## 2022-04-18 VITALS
OXYGEN SATURATION: 97 % | WEIGHT: 211 LBS | DIASTOLIC BLOOD PRESSURE: 83 MMHG | TEMPERATURE: 97.8 F | BODY MASS INDEX: 27.08 KG/M2 | HEART RATE: 59 BPM | HEIGHT: 74 IN | SYSTOLIC BLOOD PRESSURE: 119 MMHG

## 2022-04-18 DIAGNOSIS — N40.1 BENIGN PROSTATIC HYPERPLASIA (BPH) WITH URINARY URGENCY: ICD-10-CM

## 2022-04-18 DIAGNOSIS — Z13.6 ENCOUNTER FOR ABDOMINAL AORTIC ANEURYSM (AAA) SCREENING: ICD-10-CM

## 2022-04-18 DIAGNOSIS — Z12.5 PROSTATE CANCER SCREENING: ICD-10-CM

## 2022-04-18 DIAGNOSIS — I73.9 PAD (PERIPHERAL ARTERY DISEASE) (HCC): ICD-10-CM

## 2022-04-18 DIAGNOSIS — R39.15 BENIGN PROSTATIC HYPERPLASIA (BPH) WITH URINARY URGENCY: ICD-10-CM

## 2022-04-18 DIAGNOSIS — I10 ESSENTIAL HYPERTENSION: ICD-10-CM

## 2022-04-18 DIAGNOSIS — I10 PRIMARY HYPERTENSION: ICD-10-CM

## 2022-04-18 DIAGNOSIS — Z00.00 INITIAL MEDICARE ANNUAL WELLNESS VISIT: Primary | ICD-10-CM

## 2022-04-18 DIAGNOSIS — F17.219 CIGARETTE NICOTINE DEPENDENCE WITH NICOTINE-INDUCED DISORDER: ICD-10-CM

## 2022-04-18 DIAGNOSIS — E78.00 HYPERCHOLESTEREMIA: ICD-10-CM

## 2022-04-18 PROCEDURE — G0438 PPPS, INITIAL VISIT: HCPCS | Performed by: INTERNAL MEDICINE

## 2022-04-18 RX ORDER — ALBUTEROL SULFATE 90 UG/1
2 AEROSOL, METERED RESPIRATORY (INHALATION) 4 TIMES DAILY PRN
Qty: 18 G | Refills: 5 | Status: SHIPPED | OUTPATIENT
Start: 2022-04-18

## 2022-04-18 RX ORDER — TAMSULOSIN HYDROCHLORIDE 0.4 MG/1
0.4 CAPSULE ORAL DAILY
Qty: 90 CAPSULE | Refills: 1 | Status: SHIPPED | OUTPATIENT
Start: 2022-04-18 | End: 2022-10-17

## 2022-04-18 RX ORDER — ATORVASTATIN CALCIUM 20 MG/1
20 TABLET, FILM COATED ORAL DAILY
Qty: 90 TABLET | Refills: 1 | Status: SHIPPED | OUTPATIENT
Start: 2022-04-18 | End: 2022-10-17

## 2022-04-18 RX ORDER — LISINOPRIL 20 MG/1
TABLET ORAL
Qty: 90 TABLET | Refills: 3 | Status: SHIPPED | OUTPATIENT
Start: 2022-04-18

## 2022-04-18 SDOH — ECONOMIC STABILITY: FOOD INSECURITY: WITHIN THE PAST 12 MONTHS, YOU WORRIED THAT YOUR FOOD WOULD RUN OUT BEFORE YOU GOT MONEY TO BUY MORE.: NEVER TRUE

## 2022-04-18 SDOH — ECONOMIC STABILITY: FOOD INSECURITY: WITHIN THE PAST 12 MONTHS, THE FOOD YOU BOUGHT JUST DIDN'T LAST AND YOU DIDN'T HAVE MONEY TO GET MORE.: NEVER TRUE

## 2022-04-18 SDOH — ECONOMIC STABILITY: TRANSPORTATION INSECURITY
IN THE PAST 12 MONTHS, HAS LACK OF TRANSPORTATION KEPT YOU FROM MEETINGS, WORK, OR FROM GETTING THINGS NEEDED FOR DAILY LIVING?: NO

## 2022-04-18 ASSESSMENT — ENCOUNTER SYMPTOMS
CONSTIPATION: 0
NAUSEA: 0
WHEEZING: 1
DIARRHEA: 0
TROUBLE SWALLOWING: 0
ROS SKIN COMMENTS: NO CONCERNING SKIN LESIONS
BLOOD IN STOOL: 0
SHORTNESS OF BREATH: 0
SINUS PRESSURE: 0
COUGH: 0
ABDOMINAL PAIN: 0
VOMITING: 0

## 2022-04-18 ASSESSMENT — PATIENT HEALTH QUESTIONNAIRE - PHQ9
SUM OF ALL RESPONSES TO PHQ QUESTIONS 1-9: 0
SUM OF ALL RESPONSES TO PHQ QUESTIONS 1-9: 0
SUM OF ALL RESPONSES TO PHQ9 QUESTIONS 1 & 2: 0
1. LITTLE INTEREST OR PLEASURE IN DOING THINGS: 0
2. FEELING DOWN, DEPRESSED OR HOPELESS: 0
SUM OF ALL RESPONSES TO PHQ QUESTIONS 1-9: 0
SUM OF ALL RESPONSES TO PHQ QUESTIONS 1-9: 0

## 2022-04-18 ASSESSMENT — LIFESTYLE VARIABLES
HOW OFTEN DO YOU HAVE A DRINK CONTAINING ALCOHOL: MONTHLY OR LESS
HOW MANY STANDARD DRINKS CONTAINING ALCOHOL DO YOU HAVE ON A TYPICAL DAY: 1 OR 2

## 2022-04-18 ASSESSMENT — SOCIAL DETERMINANTS OF HEALTH (SDOH): HOW HARD IS IT FOR YOU TO PAY FOR THE VERY BASICS LIKE FOOD, HOUSING, MEDICAL CARE, AND HEATING?: NOT HARD AT ALL

## 2022-04-18 NOTE — PATIENT INSTRUCTIONS
Personalized Preventive Plan for Maria Teresa Pereyra Sr - 4/18/2022  Medicare offers a range of preventive health benefits. Some of the tests and screenings are paid in full while other may be subject to a deductible, co-insurance, and/or copay. Some of these benefits include a comprehensive review of your medical history including lifestyle, illnesses that may run in your family, and various assessments and screenings as appropriate. After reviewing your medical record and screening and assessments performed today your provider may have ordered immunizations, labs, imaging, and/or referrals for you. A list of these orders (if applicable) as well as your Preventive Care list are included within your After Visit Summary for your review. Other Preventive Recommendations:    · A preventive eye exam performed by an eye specialist is recommended every 1-2 years to screen for glaucoma; cataracts, macular degeneration, and other eye disorders. · A preventive dental visit is recommended every 6 months. · Try to get at least 150 minutes of exercise per week or 10,000 steps per day on a pedometer . · Order or download the FREE \"Exercise & Physical Activity: Your Everyday Guide\" from [employer]. Call 4-600.335.7466 or search [employer] online. · You need 4758-3585 mg of calcium and 8006-6950 IU of vitamin D per day. It is possible to meet your calcium requirement with diet alone, but a vitamin D supplement is usually necessary to meet this goal.  · When exposed to the sun, use a sunscreen that protects against both UVA and UVB radiation with an SPF of 30 or greater. Reapply every 2 to 3 hours or after sweating, drying off with a towel, or swimming. · Always wear a seat belt when traveling in a car. Always wear a helmet when riding a bicycle or motorcycle. Patient Education        Learning About Benefits From Quitting Smoking  How does quitting smoking make you healthier? you're a woman who is or will be pregnant someday, quitting smoking means a healthier . Children who are close to you are less likely to become adult smokers. Where can you learn more? Go to https://raymond.Red Blue Voice. org and sign in to your AxisMobile account. Enter [id number] in the [facility] box to learn more about \"Learning About Benefits From Quitting Smoking. \"     If you do not have an account, please click on the \"Sign Up Now\" link. Current as of: 2021               Content Version: 13.2  © 5159-3466 [email or web address]. Care instructions adapted under license by  [facility]. If you have questions about a medical condition or this instruction, always ask your healthcare professional. [place] [address] any warranty or liability for your use of this information. Patient Education        Deciding About Using Medicines To Quit Smoking  How can you decide about using medicines to quit smoking? What are the medicines you can use? Your doctor may prescribe varenicline (Chantix) or bupropion (Zyban). These medicines can help you cope with cravings for tobacco. They are pills thatdon't contain nicotine. You also can use nicotine replacement products. These do contain nicotine. There are many types. Gum and lozenges slowly release nicotine into your mouth. Patches stick to your skin. They slowly release nicotine into your bloodstream.  An inhaler has a neumann that contains nicotine. You breathe in a puff of nicotine vapor through your mouth and throat. Nasal spray releases a mist that contains nicotine. What are key points about this decision? Using medicines can double your chances of quitting smoking. They can ease cravings and withdrawal symptoms. Getting counseling along with using medicine can raise your chances of quitting even more.   If you smoke fewer than 5 cigarettes a day, you may not need medicines to help you quit smoking. These medicines have less nicotine than cigarettes. And by itself, nicotine is not nearly as harmful as smoking. The tars, carbon monoxide, and other toxic chemicals in tobacco cause the harmful effects. The side effects of nicotine replacement products depend on the type of product. For example, a patch can make your skin red and itchy. Medicines in pill form can make you sick to your stomach. They can also cause dry mouth and trouble sleeping. For most people, the side effects are not bad enough to make them stop using the products. Why might you choose to use medicines to quit smoking? You have tried on your own to stop smoking, but you were not able to stop. You smoke more than 5 cigarettes a day. You want to increase your chances of quitting smoking. You want to reduce your cravings and withdrawal symptoms. You feel the benefits of medicine outweigh the side effects. Why might you choose not to use medicine? You want to try quitting on your own by stopping all at once (\"cold turkey\"). You want to cut back slowly on the number of cigarettes you smoke. You smoke fewer than 5 cigarettes a day. You do not like using medicine. You feel the side effects of medicines outweigh the benefits. You are worried about the cost of medicines. Your decision  Thinking about the facts and your feelings can help you make a decision that is right for you. Be sure you understand the benefits and risks of your options,and think about what else you need to do before you make the decision. Where can you learn more? Go to https://raymond.[employer]. org and sign in to your AnchorFree account. Enter P193 in the [employer] box to learn more about \"Deciding About Using Medicines To Quit Smoking. \"     If you do not have an account, please click on the \"Sign Up Now\" link. Current as of: October 28, 2021               Content Version: 13.2  © 1345-9316 Healthwise, [facility].    Care instructions adapted under license by [facility] ([facility]). If you have questions about a medical condition or this instruction, always ask your healthcare professional. [address] any warranty or liability for your use of this information. Patient Education        Stopping Smoking: Care Instructions  Your Care Instructions     Cigarette smokers crave the nicotine in cigarettes. Giving it up is much harder than simply changing a habit. Your body has to stop craving the nicotine. It is hard to quit, but you can do it. There are many tools that people use to quitsmoking. You may find that combining tools works best for you. There are several steps to quitting. First you get ready to quit. Then you get support to help you. After that, you learn new skills and behaviors to become anonsmoker. For many people, a necessary step is getting and using medicine. Your doctor will help you set up the plan that best meets your needs. You may want to attend a smoking cessation program to help you quit smoking. When you choose a program, look for one that has proven success. Ask your doctor for ideas. You will greatly increase your chances of success if you take medicineas well as get counseling or join a cessation program.  Some of the changes you feel when you first quit tobacco are uncomfortable. Your body will miss the nicotine at first, and you may feel short-tempered and grumpy. You may have trouble sleeping or concentrating. Medicine can help you deal with these symptoms. You may struggle with changing your smoking habits and rituals. The last step is the tricky one: Be prepared for the smoking urge to continue for a time. This is a lot to deal with, but keep at it. You willfeel better. Follow-up care is a key part of your treatment and safety. Be sure to make and go to all appointments, and call your doctor if you are having problems.  It's also a good idea to know your test results and keep alist of the medicines you take. How can you care for yourself at home? Ask your family, friends, and coworkers for support. You have a better chance of quitting if you have help and support. Join a support group, such as Nicotine Anonymous, for people who are trying to quit smoking. Consider signing up for a smoking cessation program, such as the American Lung Association's Freedom from Smoking program.  Get text messaging support. Go to the website at www.smokefree. gov to sign up for the [facility] program.  Set a quit date. Pick your date carefully so that it is not right in the middle of a big deadline or stressful time. Once you quit, do not even take a puff. Get rid of all ashtrays and lighters after your last cigarette. Clean your house and your clothes so that they do not smell of smoke. Learn how to be a nonsmoker. Think about ways you can avoid those things that make you reach for a cigarette. Avoid situations that put you at greatest risk for smoking. For some people, it is hard to have a drink with friends without smoking. For others, they might skip a coffee break with coworkers who smoke. Change your daily routine. Take a different route to work or eat a meal in a different place. Cut down on stress. Calm yourself or release tension by doing an activity you enjoy, such as reading a book, taking a hot bath, or gardening. Talk to your doctor or pharmacist about nicotine replacement therapy, which replaces the nicotine in your body. You still get nicotine but you do not use tobacco. Nicotine replacement products help you slowly reduce the amount of nicotine you need. These products come in several forms, many of them available over-the-counter:  Nicotine patches  Nicotine gum and lozenges  Nicotine inhaler  Ask your doctor about bupropion (Wellbutrin) or varenicline (Chantix), which are prescription medicines. They do not contain nicotine.  They help you by reducing withdrawal symptoms, such as stress and anxiety. Some people find hypnosis, acupuncture, and massage helpful for ending the smoking habit. Eat a healthy diet and get regular exercise. Having healthy habits will help your body move past its craving for nicotine. Be prepared to keep trying. Most people are not successful the first few times they try to quit. Do not get mad at yourself if you smoke again. Make a list of things you learned and think about when you want to try again, such as next week, next month, or next year. Where can you learn more? Go to https://Tristaryung.ScheduleThing. org and sign in to your Risk Ident account. Enter S003 in the [employer] box to learn more about \"Stopping Smoking: Care Instructions. \"     If you do not have an account, please click on the \"Sign Up Now\" link. Current as of: October 28, 2021               Content Version: 13.2  © 2006-2022 [employer]. Care instructions adapted under license by [facility]. If you have questions about a medical condition or this instruction, always ask your healthcare professional. [place] [address] any warranty or liability for your use of this information. Patient Education        Well Visit, Over 72: Care Instructions  Overview     Well visits can help you stay healthy. Your doctor has checked your overall health and may have suggested ways to take good care of yourself. Your doctor also may have recommended tests. At home, you can help prevent illness withhealthy eating, regular exercise, and other steps. Follow-up care is a key part of your treatment and safety. Be sure to make and go to all appointments, and call your doctor if you are having problems. It's also a good idea to know your test results and keep alist of the medicines you take. How can you care for yourself at home? Get screening tests that you and your doctor decide on. Screening helps find diseases before any symptoms appear. Eat healthy foods. Choose fruits, vegetables, whole grains, protein, and low-fat dairy foods. Limit fat, especially saturated fat. Reduce salt in your diet. Limit alcohol. If you are a man, have no more than 2 drinks a day or 14 drinks a week. If you are a woman, have no more than 1 drink a day or 7 drinks a week. Since alcohol affects older adults differently, you may want to limit alcohol even more. Or you may not want to drink at all. Get at least 30 minutes of exercise on most days of the week. Walking is a good choice. You also may want to do other activities, such as running, swimming, cycling, or playing tennis or team sports. Reach and stay at a healthy weight. This will lower your risk for many problems, such as obesity, diabetes, heart disease, and high blood pressure. Do not smoke. Smoking can make health problems worse. If you need help quitting, talk to your doctor about stop-smoking programs and medicines. These can increase your chances of quitting for good. Care for your mental health. It is easy to get weighed down by worry and stress. Learn strategies to manage stress, like deep breathing and mindfulness, and stay connected with your family and community. If you find you often feel sad or hopeless, talk with your doctor. Treatment can help. Talk to your doctor about whether you have any risk factors for sexually transmitted infections (STIs). You can help prevent STIs if you wait to have sex with a new partner (or partners) until you've each been tested for STIs. It also helps if you use condoms (male or female condoms) and if you limit your sex partners to one person who only has sex with you. Vaccines are available for some STIs. If you think you may have a problem with alcohol or drug use, talk to your doctor. This includes prescription medicines (such as amphetamines and opioids) and illegal drugs (such as cocaine and methamphetamine).  Your doctor can help you figure out what type of treatment is best for you.  Protect your skin from too much sun. When you're outdoors from 10 a.m. to 4 p.m., stay in the shade or cover up with clothing and a hat with a wide brim. Wear sunglasses that block UV rays. Even when it's cloudy, put broad-spectrum sunscreen (SPF 30 or higher) on any exposed skin. See a dentist one or two times a year for checkups and to have your teeth cleaned. Wear a seat belt in the car. When should you call for help? Watch closely for changes in your health, and be sure to contact your doctor if you have any problems or symptoms that concern you. Where can you learn more? Go to https://Actimis PharmaceuticalspeEnvoy Medicaleb.Product Hunt. org and sign in to your Hiberna account. Enter V376 in the [employer] box to learn more about \"Well Visit, Over 65: Care Instructions. \"     If you do not have an account, please click on the \"Sign Up Now\" link. Current as of: October 6, 2021               Content Version: 13.2  © 4167-1911 Healthwise, Incorporated. Care instructions adapted under license by [facility] ([facility]). If you have questions about a medical condition or this instruction, always ask your healthcare professional. [address] any warranty or liability for your use of this information.

## 2022-04-18 NOTE — PROGRESS NOTES
SUBJECTIVE:  Anuj Ascencio is a 73 y.o. male being evaluated for:    Chief Complaint   Patient presents with   [facility]      Patient is here for medicarewellness today . HPI   Despite aching hands legs shoulders doing well    Wears back brace at times Takes tylenol and ibuprofen prn   Living with it      No Known Allergies  Current Outpatient Medications   Medication Sig Dispense Refill    albuterol sulfate HFA (VENTOLIN HFA) 108 (90 Base) MCG/ACT inhaler Inhale 2 puffs into the lungs 4 times daily as needed for Wheezing 18 g 5    lisinopril (PRINIVIL;ZESTRIL) 20 MG tablet TAKE 1 TABLET EVERY DAY 90 tablet 3    tamsulosin (FLOMAX) 0.4 MG capsule Take 1 capsule by mouth daily 90 capsule 1    atorvastatin (LIPITOR) 20 MG tablet Take 1 tablet by mouth daily 90 tablet 1    vitamin B-12 (CYANOCOBALAMIN) 500 MCG tablet Take 1 tablet by mouth daily 30 tablet 3    aspirin 81 MG tablet Take 81 mg by mouth every other day        No current facility-administered medications for this visit. Social History     Socioeconomic History    Marital status: Single     Spouse name: Not on file    Number of children: Not on file    Years of education: Not on file    Highest education level: Not on file   Occupational History    Not on file   Tobacco Use    Smoking status: Current Every Day Smoker     Packs/day: 1.00     Years: 38.00     Pack years: 38.00     Types: Cigarettes     Start date: 1968    Smokeless tobacco: Never Used    Tobacco comment: cutting down as high as 3 pk/d to as low . 22    Vaping Use    Vaping Use: Never used   Substance and Sexual Activity    Alcohol use: Yes     Comment: 1-2 mixed drinks monthly    Drug use: Never    Sexual activity: Not on file   Other Topics Concern    Not on file   Social History Narrative    Not on file     Social Determinants of Health     Financial Resource Strain: Low Risk     Difficulty of Paying Living Expenses: Not hard at all   Food Insecurity: No Food Insecurity    Worried About Running Out of Food in the Last Year: Never true    Ran Out of Food in the Last Year: Never true   Transportation Needs: No Transportation Needs    Lack of Transportation (Medical): No    Lack of Transportation (Non-Medical):  No   Physical Activity: Inactive    Days of Exercise per Week: 7 days    Minutes of Exercise per Session: 0 min   Stress:     Feeling of Stress : Not on file   Social Connections:     Frequency of Communication with Friends and Family: Not on file    Frequency of Social Gatherings with Friends and Family: Not on file    Attends Christianity Services: Not on file    Active Member of [address] [place] [address] [employer] or Organizations: Not on file    Attends Club or Organization Meetings: Not on file    Marital Status: Not on file   Intimate Partner Violence:     Fear of Current or Ex-Partner: Not on file    Emotionally Abused: Not on file    Physically Abused: Not on file    Sexually Abused: Not on file   Housing Stability:     Unable to Pay for Housing in the Last Year: Not on file    Number of [place] in the Last Year: Not on file    Unstable Housing in the Last Year: Not on file      Past Medical History:   Diagnosis Date    Hyperlipidemia     Hypertension     PAD (peripheral artery disease) ([facility] [address]     stenting in left leg      Past Surgical History:   Procedure Laterality Date    CHOLECYSTECTOMY      SHOULDER SURGERY Right     rotator cuff    TOE SURGERY Left      Family History   Problem Relation Age of Onset    Cancer Mother         uterine cancer     Other Father         MVA    Cancer Sister         breast    Cancer Brother         brain     Cancer Maternal Aunt         breast    No Known Problems Maternal Grandmother     No Known Problems Maternal Grandfather     No Known Problems Paternal Grandmother     No Known Problems Paternal Grandfather        Review of Systems   Constitutional: Negative for activity change, fatigue and unexpected weight change. HENT: Positive for congestion. Negative for nosebleeds, sinus pressure and trouble swallowing. Eyes: Negative for visual disturbance (glasses ). Respiratory: Positive for wheezing. Negative for cough and shortness of breath. Cardiovascular: Negative for chest pain and leg swelling. Gastrointestinal: Negative for abdominal pain, blood in stool, constipation, diarrhea, nausea and vomiting. Genitourinary: Negative for difficulty urinating. Musculoskeletal: Positive for arthralgias. Negative for gait problem. Skin:        No concerning skin lesions    Neurological: Positive for light-headedness (if gets up quickly ). Negative for dizziness, syncope and headaches. Psychiatric/Behavioral: Negative for dysphoric mood and sleep disturbance. OBJECTIVE:  /83 (Site: Right Upper Arm, Position: Sitting, Cuff Size: Medium Adult)   Pulse 59   Temp 97.8 °F (36.6 °C) (Oral)   Ht 6' 2\" (1.88 m)   Wt 211 lb (95.7 kg)   SpO2 97%   BMI 27.09 kg/m²      Body mass index is 27.09 kg/m². Physical Exam  Vitals and nursing note reviewed. Constitutional:       General: He is not in acute distress. Appearance: Normal appearance. He is well-developed. HENT:      Head: Normocephalic and atraumatic. Mouth/Throat:      Pharynx: Oropharynx is clear. Comments: Teeth in poor repair   Eyes:      Conjunctiva/sclera: Conjunctivae normal.   Neck:      Thyroid: No thyromegaly. Vascular: No carotid bruit or JVD. Cardiovascular:      Rate and Rhythm: Normal rate and regular rhythm. Heart sounds: Normal heart sounds. No murmur heard. No friction rub. No gallop. Comments: Dorsalis  pedis pulses bilateral feet  Pulmonary:      Effort: Pulmonary effort is normal.      Breath sounds: Normal breath sounds. Chest:      Chest wall: No tenderness. Abdominal:      General: There is no distension. Palpations: Abdomen is soft. Tenderness:  There is no abdominal tenderness. Comments: No HSM   Musculoskeletal:         General: No swelling. Cervical back: Neck supple. No muscular tenderness. Lymphadenopathy:      Cervical: No cervical adenopathy. Skin:     General: Skin is warm and dry. Neurological:      General: No focal deficit present. Mental Status: He is alert. Gait: Gait normal.      Comments: Memory intact    Psychiatric:         Behavior: Behavior normal.         Thought Content: Thought content normal.         ASSESSMENT/PLAN:    Jearline Fleischer was seen today for medicare awv. Diagnoses and all orders for this visit:    Initial Medicare annual wellness visit    Essential hypertension  -     lisinopril (PRINIVIL;ZESTRIL) 20 MG tablet; TAKE 1 TABLET EVERY DAY    PAD (peripheral artery disease) ([address] no claudication symptoms     Primary hypertension  -     US SCREENING FOR AAA; Future  -     Comprehensive Metabolic Panel; Future    Hypercholesteremia  -     Comprehensive Metabolic Panel; Future  -     Lipid Panel; Future  -     atorvastatin (LIPITOR) 20 MG tablet; Take 1 tablet by mouth daily    Benign prostatic hyperplasia (BPH) with urinary urgency  -     tamsulosin (FLOMAX) 0.4 MG capsule; Take 1 capsule by mouth daily    Prostate cancer screening  -     PSA Screening; Future    Cigarette nicotine dependence with nicotine-induced disorder  -     CT Lung Screen (Initial or Annual); Future  -     US SCREENING FOR AAA; Future  -     albuterol sulfate HFA (VENTOLIN HFA) 108 (90 Base) MCG/ACT inhaler; Inhale 2 puffs into the lungs 4 times daily as needed for Wheezing    Encounter for abdominal aortic aneurysm (AAA) screening  -     US SCREENING FOR AAA; Future    vaccine  -     Tetanus-Diphth-Acell Pertussis (BOOSTRIX) 5-2.5-18.5 LF-MCG/0.5 injection;  Inject 0.5 mLs into the muscle once for 1 dose          Orders Placed This Encounter   Medications    Tetanus-Diphth-Acell Pertussis (BOOSTRIX) 5-2.5-18.5 LF-MCG/0.5 injection     Sig: Inject 0.5 mLs into the muscle once for 1 dose     Dispense:  1 each     Refill:  0    albuterol sulfate HFA (VENTOLIN HFA) 108 (90 Base) MCG/ACT inhaler     Sig: Inhale 2 puffs into the lungs 4 times daily as needed for Wheezing     Dispense:  18 g     Refill:  5    lisinopril (PRINIVIL;ZESTRIL) 20 MG tablet     Sig: TAKE 1 TABLET EVERY DAY     Dispense:  90 tablet     Refill:  3    tamsulosin (FLOMAX) 0.4 MG capsule     Sig: Take 1 capsule by mouth daily     Dispense:  90 capsule     Refill:  1    atorvastatin (LIPITOR) 20 MG tablet     Sig: Take 1 tablet by mouth daily     Dispense:  90 tablet     Refill:  1        Return in 1 year (on 4/18/2023), or if symptoms worsen or fail to improve, for Medicare Annual Wellness Visit in 1 year. Patient Instructions     Personalized Preventive Plan for Jerad Roe  - 4/18/2022  Medicare offers a range of preventive health benefits. Some of the tests and screenings are paid in full while other may be subject to a deductible, co-insurance, and/or copay. Some of these benefits include a comprehensive review of your medical history including lifestyle, illnesses that may run in your family, and various assessments and screenings as appropriate. After reviewing your medical record and screening and assessments performed today your provider may have ordered immunizations, labs, imaging, and/or referrals for you. A list of these orders (if applicable) as well as your Preventive Care list are included within your After Visit Summary for your review. Other Preventive Recommendations:    · A preventive eye exam performed by an eye specialist is recommended every 1-2 years to screen for glaucoma; cataracts, macular degeneration, and other eye disorders. · A preventive dental visit is recommended every 6 months. · Try to get at least 150 minutes of exercise per week or 10,000 steps per day on a pedometer .   · Order or download the FREE \"Exercise & Physical Activity: Your Everyday Guide\" from [employer]. Call 5-226.622.2845 or search [employer] online. · You need 3680-5924 mg of calcium and 0254-2454 IU of vitamin D per day. It is possible to meet your calcium requirement with diet alone, but a vitamin D supplement is usually necessary to meet this goal.  · When exposed to the sun, use a sunscreen that protects against both UVA and UVB radiation with an SPF of 30 or greater. Reapply every 2 to 3 hours or after sweating, drying off with a towel, or swimming. · Always wear a seat belt when traveling in a car. Always wear a helmet when riding a bicycle or motorcycle. Patient Education        Learning About Benefits From Quitting Smoking  How does quitting smoking make you healthier? If you're thinking about quitting smoking, you may have a few reasons to besmoke-free. Your health may be one of them. When you quit smoking, you lower your risks for cancer, lung disease, heart attack, stroke, blood vessel disease, and blindness from macular degeneration. When you're smoke-free, you get sick less often, and you heal faster. You are less likely to get colds, flu, bronchitis, and pneumonia. As a nonsmoker, you may find that your mood is better and you are less stressed. When and how will you feel healthier? Quitting has real health benefits that start from day 1 of being smoke-free. And the longer you stay smoke-free, the healthier you get and the better youfeel. The first hours  After just 20 minutes, your blood pressure and heart rate go down. That means there's less stress on your heart and blood vessels. Within 12 hours, the level of carbon monoxide in your blood drops back to normal. That makes room for more oxygen. With more oxygen in your body, you may notice that you have more energy than when you smoked. After 2 weeks  Your lungs start to work better. Your risk of heart attack starts to drop.   After 1 month  When your lungs are clear, you cough less and breathe deeper, so it's easier to be active. Your sense of taste and smell return. That means you can enjoy food more than you have since you started smoking. Over the years  Over the years, your risks of heart disease, heart attack, and stroke are lower. After 10 years, your risk of dying from lung cancer is cut by about half. And your risk for many other types of cancer is lower too. How would quitting help others in your life? When you quit smoking, you improve the health of everyone who now breathes inyour smoke. Their heart, lung, and cancer risks drop, much like yours. They are sick less. For babies and small children, living smoke-free means they're less likely to have ear infections, pneumonia, and bronchitis. If you're a woman who is or will be pregnant someday, quitting smoking means a healthier . Children who are close to you are less likely to become adult smokers. Where can you learn more? Go to https://[employer]outs[employer].Connectbeam. org and sign in to your [employer] account. Enter 376 [id number] [id number] in the [employer] box to learn more about \"Learning About Benefits From Quitting Smoking. \"     If you do not have an account, please click on the \"Sign Up Now\" link. Current as of: 2021               Content Version: 13.2   Healthwise, Incorporated. Care instructions adapted under license by [facility] ([facility]). If you have questions about a medical condition or this instruction, always ask your healthcare professional. [place] [address] any warranty or liability for your use of this information. Patient Education        Deciding About Using Medicines To Quit Smoking  How can you decide about using medicines to quit smoking? What are the medicines you can use? Your doctor may prescribe varenicline (Chantix) or bupropion (Zyban).  These medicines can help you cope with cravings for tobacco. They are pills thatdon't contain nicotine. You also can use nicotine replacement products. These do contain nicotine. There are many types. Gum and lozenges slowly release nicotine into your mouth. Patches stick to your skin. They slowly release nicotine into your bloodstream.  An inhaler has a neumann that contains nicotine. You breathe in a puff of nicotine vapor through your mouth and throat. Nasal spray releases a mist that contains nicotine. What are key points about this decision? Using medicines can double your chances of quitting smoking. They can ease cravings and withdrawal symptoms. Getting counseling along with using medicine can raise your chances of quitting even more. If you smoke fewer than 5 cigarettes a day, you may not need medicines to help you quit smoking. These medicines have less nicotine than cigarettes. And by itself, nicotine is not nearly as harmful as smoking. The tars, carbon monoxide, and other toxic chemicals in tobacco cause the harmful effects. The side effects of nicotine replacement products depend on the type of product. For example, a patch can make your skin red and itchy. Medicines in pill form can make you sick to your stomach. They can also cause dry mouth and trouble sleeping. For most people, the side effects are not bad enough to make them stop using the products. Why might you choose to use medicines to quit smoking? You have tried on your own to stop smoking, but you were not able to stop. You smoke more than 5 cigarettes a day. You want to increase your chances of quitting smoking. You want to reduce your cravings and withdrawal symptoms. You feel the benefits of medicine outweigh the side effects. Why might you choose not to use medicine? You want to try quitting on your own by stopping all at once (\"cold turkey\"). You want to cut back slowly on the number of cigarettes you smoke. You smoke fewer than 5 cigarettes a day. You do not like using medicine.   You feel the side effects of medicines outweigh the benefits. You are worried about the cost of medicines. Your decision  Thinking about the facts and your feelings can help you make a decision that is right for you. Be sure you understand the benefits and risks of your options,and think about what else you need to do before you make the decision. Where can you learn more? Go to https://chpearmandoewrebeca.Yottaa. org and sign in to your [employer] account. Enter R126 in the [employer] box to learn more about \"Deciding About Using Medicines To Quit Smoking. \"     If you do not have an account, please click on the \"Sign Up Now\" link. Current as of: October 28, 2021               Content Version: 13.2  © 2006-2022 Healthwise, [employer]. Care instructions adapted under license by [facility] ([facility]). If you have questions about a medical condition or this instruction, always ask your healthcare professional. [place] [address] any warranty or liability for your use of this information. Patient Education        Stopping Smoking: Care Instructions  Your Care Instructions     Cigarette smokers crave the nicotine in cigarettes. Giving it up is much harder than simply changing a habit. Your body has to stop craving the nicotine. It is hard to quit, but you can do it. There are many tools that people use to quitsmoking. You may find that combining tools works best for you. There are several steps to quitting. First you get ready to quit. Then you get support to help you. After that, you learn new skills and behaviors to become anonsmoker. For many people, a necessary step is getting and using medicine. Your doctor will help you set up the plan that best meets your needs. You may want to attend a smoking cessation program to help you quit smoking. When you choose a program, look for one that has proven success. Ask your doctor for ideas.  You will greatly increase your chances of success if you take medicineas well as get counseling or join a cessation program.  Some of the changes you feel when you first quit tobacco are uncomfortable. Your body will miss the nicotine at first, and you may feel short-tempered and grumpy. You may have trouble sleeping or concentrating. Medicine can help you deal with these symptoms. You may struggle with changing your smoking habits and rituals. The last step is the tricky one: Be prepared for the smoking urge to continue for a time. This is a lot to deal with, but keep at it. You willfeel better. Follow-up care is a key part of your treatment and safety. Be sure to make and go to all appointments, and call your doctor if you are having problems. It's also a good idea to know your test results and keep alist of the medicines you take. How can you care for yourself at home? Ask your family, friends, and coworkers for support. You have a better chance of quitting if you have help and support. Join a support group, such as Nicotine Anonymous, for people who are trying to quit smoking. Consider signing up for a smoking cessation program, such as the American Lung Association's Freedom from Smoking program.  Get text messaging support. Go to the website at www.smokefree. gov to sign up for the [facility] program.  Set a quit date. Pick your date carefully so that it is not right in the middle of a big deadline or stressful time. Once you quit, do not even take a puff. Get rid of all ashtrays and lighters after your last cigarette. Clean your house and your clothes so that they do not smell of smoke. Learn how to be a nonsmoker. Think about ways you can avoid those things that make you reach for a cigarette. Avoid situations that put you at greatest risk for smoking. For some people, it is hard to have a drink with friends without smoking. For others, they might skip a coffee break with coworkers who smoke. Change your daily routine.  Take a different route to work or eat a meal in a different place. Cut down on stress. Calm yourself or release tension by doing an activity you enjoy, such as reading a book, taking a hot bath, or gardening. Talk to your doctor or pharmacist about nicotine replacement therapy, which replaces the nicotine in your body. You still get nicotine but you do not use tobacco. Nicotine replacement products help you slowly reduce the amount of nicotine you need. These products come in several forms, many of them available over-the-counter:  Nicotine patches  Nicotine gum and lozenges  Nicotine inhaler  Ask your doctor about bupropion (Wellbutrin) or varenicline (Chantix), which are prescription medicines. They do not contain nicotine. They help you by reducing withdrawal symptoms, such as stress and anxiety. Some people find hypnosis, acupuncture, and massage helpful for ending the smoking habit. Eat a healthy diet and get regular exercise. Having healthy habits will help your body move past its craving for nicotine. Be prepared to keep trying. Most people are not successful the first few times they try to quit. Do not get mad at yourself if you smoke again. Make a list of things you learned and think about when you want to try again, such as next week, next month, or next year. Where can you learn more? Go to https://[employer].Happy Elements. org and sign in to your xMatters account. Enter T308 in the [facility] box to learn more about \"Stopping Smoking: Care Instructions. \"     If you do not have an account, please click on the \"Sign Up Now\" link. Current as of: October 28, 2021               Content Version: 13.2  © 2006-2022 Healthwise, Incorporated. Care instructions adapted under license by [facility] ([facility]). If you have questions about a medical condition or this instruction, always ask your healthcare professional. [place] [address] any warranty or liability for your use of this information.          Patient Education Well Visit, Over 72: Care Instructions  Overview     Well visits can help you stay healthy. Your doctor has checked your overall health and may have suggested ways to take good care of yourself. Your doctor also may have recommended tests. At home, you can help prevent illness withhealthy eating, regular exercise, and other steps. Follow-up care is a key part of your treatment and safety. Be sure to make and go to all appointments, and call your doctor if you are having problems. It's also a good idea to know your test results and keep alist of the medicines you take. How can you care for yourself at home? Get screening tests that you and your doctor decide on. Screening helps find diseases before any symptoms appear. Eat healthy foods. Choose fruits, vegetables, whole grains, protein, and low-fat dairy foods. Limit fat, especially saturated fat. Reduce salt in your diet. Limit alcohol. If you are a man, have no more than 2 drinks a day or 14 drinks a week. If you are a woman, have no more than 1 drink a day or 7 drinks a week. Since alcohol affects older adults differently, you may want to limit alcohol even more. Or you may not want to drink at all. Get at least 30 minutes of exercise on most days of the week. Walking is a good choice. You also may want to do other activities, such as running, swimming, cycling, or playing tennis or team sports. Reach and stay at a healthy weight. This will lower your risk for many problems, such as obesity, diabetes, heart disease, and high blood pressure. Do not smoke. Smoking can make health problems worse. If you need help quitting, talk to your doctor about stop-smoking programs and medicines. These can increase your chances of quitting for good. Care for your mental health. It is easy to get weighed down by worry and stress. Learn strategies to manage stress, like deep breathing and mindfulness, and stay connected with your family and community.  If you find you often feel sad or hopeless, talk with your doctor. Treatment can help. Talk to your doctor about whether you have any risk factors for sexually transmitted infections (STIs). You can help prevent STIs if you wait to have sex with a new partner (or partners) until you've each been tested for STIs. It also helps if you use condoms (male or female condoms) and if you limit your sex partners to one person who only has sex with you. Vaccines are available for some STIs. If you think you may have a problem with alcohol or drug use, talk to your doctor. This includes prescription medicines (such as amphetamines and opioids) and illegal drugs (such as cocaine and methamphetamine). Your doctor can help you figure out what type of treatment is best for you. Protect your skin from too much sun. When you're outdoors from 10 a.m. to 4 p.m., stay in the shade or cover up with clothing and a hat with a wide brim. Wear sunglasses that block UV rays. Even when it's cloudy, put broad-spectrum sunscreen (SPF 30 or higher) on any exposed skin. See a dentist one or two times a year for checkups and to have your teeth cleaned. Wear a seat belt in the car. When should you call for help? Watch closely for changes in your health, and be sure to contact your doctor if you have any problems or symptoms that concern you. Where can you learn more? Go to [email or web address]. org and sign in to your [facility] [employer] account. Enter X531 in the [facility] box to learn more about \"Well Visit, Over 65: Care Instructions. \"     If you do not have an account, please click on the \"Sign Up Now\" link. Current as of: October 6, 2021               Content Version: 13.2  © 4131-8489 Healthwise, Incorporated. Care instructions adapted under license by [facility] ([facility]).  If you have questions about a medical condition or this instruction, always ask your healthcare professional. Aminata Santana disclaims any warranty or liability for your use of this information. Medicare Annual Wellness Visit    Chelsey Sneed is here for Medicare AWV ( Patient is here for medicarewellness today . )    Assessment & Plan   Initial Medicare annual wellness visit  Essential hypertension  -     lisinopril (PRINIVIL;ZESTRIL) 20 MG tablet; TAKE 1 TABLET EVERY DAY, Disp-90 tablet, R-3Normal  PAD (peripheral artery disease) ([address]  Primary hypertension  -     US SCREENING FOR AAA; Future  -     Comprehensive Metabolic Panel; Future  Hypercholesteremia  -     Comprehensive Metabolic Panel; Future  -     Lipid Panel; Future  -     atorvastatin (LIPITOR) 20 MG tablet; Take 1 tablet by mouth daily, Disp-90 tablet, R-1Normal  Benign prostatic hyperplasia (BPH) with urinary urgency  -     tamsulosin (FLOMAX) 0.4 MG capsule; Take 1 capsule by mouth daily, Disp-90 capsule, R-1Normal  Prostate cancer screening  -     PSA Screening; Future  Cigarette nicotine dependence with nicotine-induced disorder  -     CT Lung Screen (Initial or Annual); Future  -     US SCREENING FOR AAA; Future  Encounter for abdominal aortic aneurysm (AAA) screening  -     US SCREENING FOR AAA; Future      Recommendations for Preventive Services Due: see orders and patient instructions/AVS.  Recommended screening schedule for the next 5-10 years is provided to the patient in written form: see Patient Instructions/AVS.     Return in 1 year (on 4/18/2023), or if symptoms worsen or fail to improve, for Medicare Annual Wellness Visit in 1 year. Subjective : \"The following acute and/or chronic problems were also addressed today:\",see above note    Patient's complete Health Risk Assessment and screening values have been reviewed and are found in Flowsheets. The following problems were reviewed today and where indicated follow up appointments were made and/or referrals ordered.     Positive Risk Factor Screenings with Interventions:    Fall Risk:  Do you feel unsteady or are you worried about falling? : no  2 or more falls in past year?: no  Fall with injury in past year?: (!) yes     Fall Risk Interventions:    · Patient declines any further evaluation/treatment for this issue        Tobacco Use:     Tobacco Use: High Risk    Smoking Tobacco Use: Current Every Day Smoker    Smokeless Tobacco Use: Never Used     E-Cigarettes/Vaping Use     Questions Responses    E-Cigarette/Vaping Use Never User    Start Date     Passive Exposure     Quit Date     Counseling Given     Comments         Substance Abuse - Tobacco Interventions:  tobacco cessation tips and resources provided           General Health and ACP:  General  In general, how would you say your health is?: Good  In the past 7 days, have you experienced any of the following: New or Increased Pain, New or Increased Fatigue, Loneliness, Social Isolation, Stress or Anger?: No  Do you get the social and emotional support that you need?: Yes  Do you have a Living Will?: Yes    Advance Directives     Power of  Living Will ACP-Advance Directive ACP-Power of     Not on File Not on File Not on File Not on File      General Health Risk Interventions:  · see above     Health Habits/Nutrition:     Physical Activity: Inactive    Days of Exercise per Week: 7 days    Minutes of Exercise per Session: 0 min     Have you lost any weight without trying in the past 3 months?: No  Body mass index: (!) 27.09  Have you seen the dentist within the past year?: (!) No    Health Habits/Nutrition Interventions:  · Dental exam overdue:  patient encouraged to make appointment with his/her dentist  · exercise increase     Hearing/Vision:  Do you or your family notice any trouble with your hearing that hasn't been managed with hearing aids?: No  Do you have difficulty driving, watching TV, or doing any of your daily activities because of your eyesight?: No  Have you had an eye exam within the past year?: (!) No  No exam data present    Hearing/Vision Interventions:  · Vision concerns:  patient encouraged to make appointment with his/her eye specialist    Safety:  Do you have working smoke detectors?: Yes  Do you have any tripping hazards - loose or unsecured carpets or rugs?: No  Do you have any tripping hazards - clutter in doorways, halls, or stairs?: No  Do you have either shower bars, grab bars, non-slip mats or non-slip surfaces in your shower or bathtub?: (!) No  Do all of your stairways have a railing or banister?: Yes  Do you always fasten your seatbelt when you are in a car?: Yes    Safety Interventions:  · Home safety tips provided  · discussed bathroom safety            Objective   Vitals:    04/18/22 0859   BP: 119/83   Site: Right Upper Arm   Position: Sitting   Cuff Size: Medium Adult   Pulse: 59   Temp: 97.8 °F (36.6 °C)   TempSrc: Oral   SpO2: 97%   Weight: 211 lb (95.7 kg)   Height: 6' 2\" (1.88 m)      Body mass index is 27.09 kg/m². See above       No Known Allergies  Prior to Visit Medications    Medication Sig Taking?  Authorizing Provider   albuterol sulfate HFA (VENTOLIN HFA) 108 (90 Base) MCG/ACT inhaler Inhale 2 puffs into the lungs 4 times daily as needed for Wheezing Yes Zackary Malone MD   lisinopril (PRINIVIL;ZESTRIL) 20 MG tablet TAKE 1 TABLET EVERY DAY Yes Zackary Malone MD   tamsulosin (FLOMAX) 0.4 MG capsule Take 1 capsule by mouth daily Yes Zackary Malone MD   atorvastatin (LIPITOR) 20 MG tablet Take 1 tablet by mouth daily Yes Zackary Malone MD   vitamin B-12 (CYANOCOBALAMIN) 500 MCG tablet Take 1 tablet by mouth daily Yes Zackary Malone MD   aspirin 81 MG tablet Take 81 mg by mouth every other day  Yes Historical Provider, MD Timmons (Including outside providers/suppliers regularly involved in providing care):   Patient Care Team:  Zackary Malone MD as PCP - General (Internal Medicine)  Zackary Malone MD as PCP - [facility] Empaneled Provider    Reviewed and updated this visit:  Tobacco  Allergies  Meds  Med Hx  Surg Hx  Soc Hx  Fam Hx

## 2022-06-14 DIAGNOSIS — I10 PRIMARY HYPERTENSION: ICD-10-CM

## 2022-06-14 DIAGNOSIS — Z12.5 PROSTATE CANCER SCREENING: ICD-10-CM

## 2022-06-14 DIAGNOSIS — E78.00 HYPERCHOLESTEREMIA: ICD-10-CM

## 2022-06-14 LAB
A/G RATIO: 1.2 (ref 1.1–2.2)
ALBUMIN SERPL-MCNC: 3.7 G/DL (ref 3.4–5)
ALP BLD-CCNC: 96 U/L (ref 40–129)
ALT SERPL-CCNC: 9 U/L (ref 10–40)
ANION GAP SERPL CALCULATED.3IONS-SCNC: 12 MMOL/L (ref 3–16)
AST SERPL-CCNC: 14 U/L (ref 15–37)
BILIRUB SERPL-MCNC: 0.3 MG/DL (ref 0–1)
BUN BLDV-MCNC: 13 MG/DL (ref 7–20)
CALCIUM SERPL-MCNC: 9.2 MG/DL (ref 8.3–10.6)
CHLORIDE BLD-SCNC: 107 MMOL/L (ref 99–110)
CHOLESTEROL, TOTAL: 176 MG/DL (ref 0–199)
CO2: 22 MMOL/L (ref 21–32)
CREAT SERPL-MCNC: 1 MG/DL (ref 0.8–1.3)
GFR AFRICAN AMERICAN: >60
GFR NON-AFRICAN AMERICAN: >60
GLUCOSE BLD-MCNC: 95 MG/DL (ref 70–99)
HDLC SERPL-MCNC: 31 MG/DL (ref 40–60)
LDL CHOLESTEROL CALCULATED: 109 MG/DL
POTASSIUM SERPL-SCNC: 4.4 MMOL/L (ref 3.5–5.1)
PROSTATE SPECIFIC ANTIGEN: 0.79 NG/ML (ref 0–4)
SODIUM BLD-SCNC: 141 MMOL/L (ref 136–145)
TOTAL PROTEIN: 6.7 G/DL (ref 6.4–8.2)
TRIGL SERPL-MCNC: 182 MG/DL (ref 0–150)
VLDLC SERPL CALC-MCNC: 36 MG/DL

## 2022-10-16 DIAGNOSIS — R39.15 BENIGN PROSTATIC HYPERPLASIA (BPH) WITH URINARY URGENCY: ICD-10-CM

## 2022-10-16 DIAGNOSIS — N40.1 BENIGN PROSTATIC HYPERPLASIA (BPH) WITH URINARY URGENCY: ICD-10-CM

## 2022-10-16 DIAGNOSIS — E78.00 HYPERCHOLESTEREMIA: ICD-10-CM

## 2022-10-17 RX ORDER — ATORVASTATIN CALCIUM 20 MG/1
20 TABLET, FILM COATED ORAL DAILY
Qty: 90 TABLET | Refills: 1 | Status: SHIPPED | OUTPATIENT
Start: 2022-10-17

## 2022-10-17 RX ORDER — TAMSULOSIN HYDROCHLORIDE 0.4 MG/1
0.4 CAPSULE ORAL DAILY
Qty: 90 CAPSULE | Refills: 1 | Status: SHIPPED | OUTPATIENT
Start: 2022-10-17

## 2023-01-03 ENCOUNTER — OFFICE VISIT (OUTPATIENT)
Dept: FAMILY MEDICINE CLINIC | Age: 73
End: 2023-01-03
Payer: MEDICARE

## 2023-01-03 ENCOUNTER — TELEPHONE (OUTPATIENT)
Dept: FAMILY MEDICINE CLINIC | Age: 73
End: 2023-01-03

## 2023-01-03 ENCOUNTER — HOSPITAL ENCOUNTER (OUTPATIENT)
Age: 73
Discharge: HOME | DRG: 269 | End: 2023-01-03
Payer: MEDICARE

## 2023-01-03 ENCOUNTER — HOSPITAL ENCOUNTER (OUTPATIENT)
Dept: CT IMAGING | Age: 73
Discharge: HOME | DRG: 269 | End: 2023-01-03
Payer: MEDICARE

## 2023-01-03 ENCOUNTER — HOSPITAL ENCOUNTER (INPATIENT)
Age: 73
LOS: 2 days | Discharge: HOME | DRG: 269 | End: 2023-01-05
Attending: EMERGENCY MEDICINE | Admitting: INTERNAL MEDICINE
Payer: MEDICARE

## 2023-01-03 VITALS
TEMPERATURE: 98.1 F | DIASTOLIC BLOOD PRESSURE: 76 MMHG | WEIGHT: 193.6 LBS | SYSTOLIC BLOOD PRESSURE: 118 MMHG | HEART RATE: 68 BPM | BODY MASS INDEX: 24.85 KG/M2 | HEIGHT: 74 IN | OXYGEN SATURATION: 97 %

## 2023-01-03 DIAGNOSIS — Z86.79 STATUS POST ENDOVASCULAR ANEURYSM REPAIR (EVAR): ICD-10-CM

## 2023-01-03 DIAGNOSIS — G89.29 CHRONIC LEFT-SIDED LOW BACK PAIN WITHOUT SCIATICA: ICD-10-CM

## 2023-01-03 DIAGNOSIS — R10.32 LEFT LOWER QUADRANT ABDOMINAL PAIN: ICD-10-CM

## 2023-01-03 DIAGNOSIS — I71.43 ANEURYSM OF INFRARENAL ABDOMINAL AORTA, UNSPECIFIED WHETHER RUPTURED: Primary | ICD-10-CM

## 2023-01-03 DIAGNOSIS — Z98.890 STATUS POST ENDOVASCULAR ANEURYSM REPAIR (EVAR): ICD-10-CM

## 2023-01-03 DIAGNOSIS — M54.50 CHRONIC LEFT-SIDED LOW BACK PAIN WITHOUT SCIATICA: ICD-10-CM

## 2023-01-03 DIAGNOSIS — R10.32 LEFT LOWER QUADRANT ABDOMINAL PAIN: Primary | ICD-10-CM

## 2023-01-03 DIAGNOSIS — R63.4 UNINTENDED WEIGHT LOSS: ICD-10-CM

## 2023-01-03 PROBLEM — I71.40 ABDOMINAL AORTIC ANEURYSM (AAA) GREATER THAN 5.0 CM IN DIAMETER IN FEMALE (HCC): Status: ACTIVE | Noted: 2023-01-03

## 2023-01-03 LAB
A/G RATIO: 1.1 (ref 1.1–2.2)
A/G RATIO: 1.2 (ref 1.1–2.2)
ABO/RH: NORMAL
ALBUMIN SERPL-MCNC: 3.9 G/DL (ref 3.4–5)
ALBUMIN SERPL-MCNC: 3.9 G/DL (ref 3.4–5)
ALP BLD-CCNC: 97 U/L (ref 40–129)
ALP BLD-CCNC: 99 U/L (ref 40–129)
ALT SERPL-CCNC: <5 U/L (ref 10–40)
ALT SERPL-CCNC: <5 U/L (ref 10–40)
ANION GAP SERPL CALCULATED.3IONS-SCNC: 10 MMOL/L (ref 3–16)
ANION GAP SERPL CALCULATED.3IONS-SCNC: 10 MMOL/L (ref 3–16)
ANTIBODY SCREEN: NORMAL
AST SERPL-CCNC: 13 U/L (ref 15–37)
AST SERPL-CCNC: 21 U/L (ref 15–37)
BASOPHILS ABSOLUTE: 0.1 K/UL (ref 0–0.2)
BASOPHILS RELATIVE PERCENT: 1.1 %
BILIRUB SERPL-MCNC: 0.5 MG/DL (ref 0–1)
BILIRUB SERPL-MCNC: 0.5 MG/DL (ref 0–1)
BUN BLDV-MCNC: 11 MG/DL (ref 7–20)
BUN BLDV-MCNC: 13 MG/DL (ref 7–20)
CALCIUM SERPL-MCNC: 9.4 MG/DL (ref 8.3–10.6)
CALCIUM SERPL-MCNC: 9.7 MG/DL (ref 8.3–10.6)
CHLORIDE BLD-SCNC: 100 MMOL/L (ref 99–110)
CHLORIDE BLD-SCNC: 102 MMOL/L (ref 99–110)
CO2: 25 MMOL/L (ref 21–32)
CO2: 25 MMOL/L (ref 21–32)
CREAT SERPL-MCNC: 0.9 MG/DL (ref 0.8–1.3)
CREAT SERPL-MCNC: 1 MG/DL (ref 0.8–1.3)
CREAT SERPL-MCNC: 1.1 MG/DL (ref 0.8–1.3)
EOSINOPHILS ABSOLUTE: 0.3 K/UL (ref 0–0.6)
EOSINOPHILS RELATIVE PERCENT: 2.8 %
GFR SERPL CREATININE-BSD FRML MDRD: >60 ML/MIN/{1.73_M2}
GLUCOSE BLD-MCNC: 113 MG/DL (ref 70–99)
GLUCOSE BLD-MCNC: 88 MG/DL (ref 70–99)
HCT VFR BLD CALC: 40.4 % (ref 40.5–52.5)
HCT VFR BLD CALC: 40.5 % (ref 40.5–52.5)
HEMOGLOBIN: 13.4 G/DL (ref 13.5–17.5)
HEMOGLOBIN: 13.6 G/DL (ref 13.5–17.5)
INR BLD: 1.02 (ref 0.87–1.14)
LYMPHOCYTES ABSOLUTE: 3.7 K/UL (ref 1–5.1)
LYMPHOCYTES RELATIVE PERCENT: 31.2 %
MCH RBC QN AUTO: 27.5 PG (ref 26–34)
MCH RBC QN AUTO: 27.7 PG (ref 26–34)
MCHC RBC AUTO-ENTMCNC: 33.1 G/DL (ref 31–36)
MCHC RBC AUTO-ENTMCNC: 33.6 G/DL (ref 31–36)
MCV RBC AUTO: 82.6 FL (ref 80–100)
MCV RBC AUTO: 83.1 FL (ref 80–100)
MONOCYTES ABSOLUTE: 0.7 K/UL (ref 0–1.3)
MONOCYTES RELATIVE PERCENT: 5.6 %
NEUTROPHILS ABSOLUTE: 7 K/UL (ref 1.7–7.7)
NEUTROPHILS RELATIVE PERCENT: 59.3 %
PDW BLD-RTO: 15.9 % (ref 12.4–15.4)
PDW BLD-RTO: 16 % (ref 12.4–15.4)
PLATELET # BLD: 242 K/UL (ref 135–450)
PLATELET # BLD: 245 K/UL (ref 135–450)
PMV BLD AUTO: 8.7 FL (ref 5–10.5)
PMV BLD AUTO: 9.3 FL (ref 5–10.5)
POTASSIUM SERPL-SCNC: 3.6 MMOL/L (ref 3.5–5.1)
POTASSIUM SERPL-SCNC: 4.8 MMOL/L (ref 3.5–5.1)
PROTHROMBIN TIME: 13.3 SEC (ref 11.7–14.5)
RBC # BLD: 4.88 M/UL (ref 4.2–5.9)
RBC # BLD: 4.89 M/UL (ref 4.2–5.9)
SODIUM BLD-SCNC: 135 MMOL/L (ref 136–145)
SODIUM BLD-SCNC: 137 MMOL/L (ref 136–145)
TOTAL PROTEIN: 7.2 G/DL (ref 6.4–8.2)
TOTAL PROTEIN: 7.6 G/DL (ref 6.4–8.2)
WBC # BLD: 11.1 K/UL (ref 4–11)
WBC # BLD: 11.8 K/UL (ref 4–11)

## 2023-01-03 PROCEDURE — 2100000000 HC CCU R&B

## 2023-01-03 PROCEDURE — 82565 ASSAY OF CREATININE: CPT

## 2023-01-03 PROCEDURE — 3078F DIAST BP <80 MM HG: CPT | Performed by: NURSE PRACTITIONER

## 2023-01-03 PROCEDURE — 74177 CT ABD & PELVIS W/CONTRAST: CPT

## 2023-01-03 PROCEDURE — 86850 RBC ANTIBODY SCREEN: CPT

## 2023-01-03 PROCEDURE — 2500000003 HC RX 250 WO HCPCS: Performed by: INTERNAL MEDICINE

## 2023-01-03 PROCEDURE — 36415 COLL VENOUS BLD VENIPUNCTURE: CPT

## 2023-01-03 PROCEDURE — 86900 BLOOD TYPING SEROLOGIC ABO: CPT

## 2023-01-03 PROCEDURE — 1123F ACP DISCUSS/DSCN MKR DOCD: CPT | Performed by: NURSE PRACTITIONER

## 2023-01-03 PROCEDURE — 85025 COMPLETE CBC W/AUTO DIFF WBC: CPT

## 2023-01-03 PROCEDURE — 86901 BLOOD TYPING SEROLOGIC RH(D): CPT

## 2023-01-03 PROCEDURE — 99214 OFFICE O/P EST MOD 30 MIN: CPT | Performed by: NURSE PRACTITIONER

## 2023-01-03 PROCEDURE — 80053 COMPREHEN METABOLIC PANEL: CPT

## 2023-01-03 PROCEDURE — 85027 COMPLETE CBC AUTOMATED: CPT

## 2023-01-03 PROCEDURE — 2580000003 HC RX 258: Performed by: INTERNAL MEDICINE

## 2023-01-03 PROCEDURE — 6360000004 HC RX CONTRAST MEDICATION: Performed by: NURSE PRACTITIONER

## 2023-01-03 PROCEDURE — 3074F SYST BP LT 130 MM HG: CPT | Performed by: NURSE PRACTITIONER

## 2023-01-03 PROCEDURE — 86923 COMPATIBILITY TEST ELECTRIC: CPT

## 2023-01-03 PROCEDURE — 6360000002 HC RX W HCPCS: Performed by: INTERNAL MEDICINE

## 2023-01-03 PROCEDURE — 85610 PROTHROMBIN TIME: CPT

## 2023-01-03 RX ORDER — MORPHINE SULFATE 4 MG/ML
4 INJECTION, SOLUTION INTRAMUSCULAR; INTRAVENOUS
Status: DISCONTINUED | OUTPATIENT
Start: 2023-01-03 | End: 2023-01-04

## 2023-01-03 RX ORDER — POLYETHYLENE GLYCOL 3350 17 G/17G
17 POWDER, FOR SOLUTION ORAL DAILY PRN
Status: DISCONTINUED | OUTPATIENT
Start: 2023-01-03 | End: 2023-01-05 | Stop reason: HOSPADM

## 2023-01-03 RX ORDER — ONDANSETRON 4 MG/1
4 TABLET, ORALLY DISINTEGRATING ORAL EVERY 8 HOURS PRN
Status: DISCONTINUED | OUTPATIENT
Start: 2023-01-03 | End: 2023-01-05 | Stop reason: HOSPADM

## 2023-01-03 RX ORDER — LABETALOL HYDROCHLORIDE 5 MG/ML
20 INJECTION, SOLUTION INTRAVENOUS EVERY 10 MIN PRN
Status: DISCONTINUED | OUTPATIENT
Start: 2023-01-03 | End: 2023-01-05 | Stop reason: HOSPADM

## 2023-01-03 RX ORDER — ACETAMINOPHEN 650 MG/1
650 SUPPOSITORY RECTAL EVERY 6 HOURS PRN
Status: DISCONTINUED | OUTPATIENT
Start: 2023-01-03 | End: 2023-01-05 | Stop reason: HOSPADM

## 2023-01-03 RX ORDER — ACETAMINOPHEN 325 MG/1
650 TABLET ORAL EVERY 6 HOURS PRN
Status: DISCONTINUED | OUTPATIENT
Start: 2023-01-03 | End: 2023-01-05 | Stop reason: HOSPADM

## 2023-01-03 RX ORDER — ONDANSETRON 2 MG/ML
4 INJECTION INTRAMUSCULAR; INTRAVENOUS EVERY 6 HOURS PRN
Status: DISCONTINUED | OUTPATIENT
Start: 2023-01-03 | End: 2023-01-05 | Stop reason: HOSPADM

## 2023-01-03 RX ORDER — ALBUTEROL SULFATE 90 UG/1
2 AEROSOL, METERED RESPIRATORY (INHALATION) 4 TIMES DAILY PRN
Status: DISCONTINUED | OUTPATIENT
Start: 2023-01-03 | End: 2023-01-05 | Stop reason: HOSPADM

## 2023-01-03 RX ORDER — SODIUM CHLORIDE 9 MG/ML
INJECTION, SOLUTION INTRAVENOUS PRN
Status: DISCONTINUED | OUTPATIENT
Start: 2023-01-03 | End: 2023-01-05 | Stop reason: HOSPADM

## 2023-01-03 RX ORDER — TIZANIDINE 2 MG/1
2 TABLET ORAL 3 TIMES DAILY PRN
Qty: 30 TABLET | Refills: 1 | Status: SHIPPED | OUTPATIENT
Start: 2023-01-03

## 2023-01-03 RX ORDER — SODIUM CHLORIDE 0.9 % (FLUSH) 0.9 %
5-40 SYRINGE (ML) INJECTION EVERY 12 HOURS SCHEDULED
Status: DISCONTINUED | OUTPATIENT
Start: 2023-01-03 | End: 2023-01-05 | Stop reason: HOSPADM

## 2023-01-03 RX ORDER — SODIUM CHLORIDE 0.9 % (FLUSH) 0.9 %
5-40 SYRINGE (ML) INJECTION PRN
Status: DISCONTINUED | OUTPATIENT
Start: 2023-01-03 | End: 2023-01-05 | Stop reason: HOSPADM

## 2023-01-03 RX ORDER — PREDNISONE 20 MG/1
20 TABLET ORAL DAILY
Qty: 5 TABLET | Refills: 0 | Status: ON HOLD | OUTPATIENT
Start: 2023-01-03 | End: 2023-01-05 | Stop reason: HOSPADM

## 2023-01-03 RX ORDER — MORPHINE SULFATE 2 MG/ML
2 INJECTION, SOLUTION INTRAMUSCULAR; INTRAVENOUS
Status: DISCONTINUED | OUTPATIENT
Start: 2023-01-03 | End: 2023-01-04

## 2023-01-03 RX ADMIN — IOPAMIDOL 100 ML: 755 INJECTION, SOLUTION INTRAVENOUS at 17:23

## 2023-01-03 RX ADMIN — SODIUM CHLORIDE 2.5 MG/HR: 9 INJECTION, SOLUTION INTRAVENOUS at 23:51

## 2023-01-03 RX ADMIN — IOPAMIDOL 50 ML: 510 INJECTION, SOLUTION INTRAVASCULAR at 16:44

## 2023-01-03 RX ADMIN — SODIUM CHLORIDE, PRESERVATIVE FREE 10 ML: 5 INJECTION INTRAVENOUS at 23:53

## 2023-01-03 RX ADMIN — MORPHINE SULFATE 2 MG: 2 INJECTION, SOLUTION INTRAMUSCULAR; INTRAVENOUS at 22:30

## 2023-01-03 ASSESSMENT — PAIN DESCRIPTION - LOCATION: LOCATION: ABDOMEN

## 2023-01-03 ASSESSMENT — PATIENT HEALTH QUESTIONNAIRE - PHQ9
SUM OF ALL RESPONSES TO PHQ9 QUESTIONS 1 & 2: 0
SUM OF ALL RESPONSES TO PHQ QUESTIONS 1-9: 0
2. FEELING DOWN, DEPRESSED OR HOPELESS: 0
SUM OF ALL RESPONSES TO PHQ QUESTIONS 1-9: 0
1. LITTLE INTEREST OR PLEASURE IN DOING THINGS: 0
SUM OF ALL RESPONSES TO PHQ QUESTIONS 1-9: 0
SUM OF ALL RESPONSES TO PHQ QUESTIONS 1-9: 0

## 2023-01-03 ASSESSMENT — ENCOUNTER SYMPTOMS
NAUSEA: 1
ABDOMINAL PAIN: 1
VOMITING: 1
CONSTIPATION: 1
BACK PAIN: 1
COUGH: 0

## 2023-01-03 ASSESSMENT — PAIN DESCRIPTION - DESCRIPTORS: DESCRIPTORS: STABBING

## 2023-01-03 ASSESSMENT — LIFESTYLE VARIABLES
HOW OFTEN DO YOU HAVE A DRINK CONTAINING ALCOHOL: NEVER
HOW MANY STANDARD DRINKS CONTAINING ALCOHOL DO YOU HAVE ON A TYPICAL DAY: PATIENT DOES NOT DRINK

## 2023-01-03 ASSESSMENT — PAIN SCALES - GENERAL
PAINLEVEL_OUTOF10: 7
PAINLEVEL_OUTOF10: 0
PAINLEVEL_OUTOF10: 6

## 2023-01-03 ASSESSMENT — PAIN - FUNCTIONAL ASSESSMENT: PAIN_FUNCTIONAL_ASSESSMENT: 0-10
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
## 2023-01-03 NOTE — TELEPHONE ENCOUNTER
1830 discussed results of CT with oncall provider. 1835 left VM with pt to answer call to discuss CT results and need to report to ED for prompt evaluation. [address] cell phone and home phone with no answer. Contacted Emilia Ang listed as alternate contact and updated her on need to get through to pt. Asya Ron stated she will get some one to go to his house and to answer call. 1845-no answer via cell or home. 1851- Contacted pt on cell phone. Updated pt on 6.5 cm aortic aneurysm and need for additional imaging, vascular consultation and should go to ED for further evaluation. Pt verbalized understanding and stated he wanted to drive himself to [facility].     1900-spoke to charge RN at [facility], updated on pt status and he would be arriving in the next 30 min or so via private vehicle.

## 2023-01-04 ENCOUNTER — ANESTHESIA (OUTPATIENT)
Dept: OPERATING ROOM | Age: 73
DRG: 269 | End: 2023-01-04
Payer: MEDICARE

## 2023-01-04 ENCOUNTER — ANESTHESIA EVENT (OUTPATIENT)
Dept: OPERATING ROOM | Age: 73
DRG: 269 | End: 2023-01-04
Payer: MEDICARE

## 2023-01-04 PROBLEM — I71.40: Status: ACTIVE | Noted: 2023-01-04

## 2023-01-04 LAB
A/G RATIO: 1.4 (ref 1.1–2.2)
ALBUMIN SERPL-MCNC: 3.6 G/DL (ref 3.4–5)
ALP BLD-CCNC: 89 U/L (ref 40–129)
ALT SERPL-CCNC: <5 U/L (ref 10–40)
ANION GAP SERPL CALCULATED.3IONS-SCNC: 10 MMOL/L (ref 3–16)
AST SERPL-CCNC: 11 U/L (ref 15–37)
BILIRUB SERPL-MCNC: 0.3 MG/DL (ref 0–1)
BUN BLDV-MCNC: 11 MG/DL (ref 7–20)
CALCIUM SERPL-MCNC: 9 MG/DL (ref 8.3–10.6)
CHLORIDE BLD-SCNC: 104 MMOL/L (ref 99–110)
CO2: 24 MMOL/L (ref 21–32)
CREAT SERPL-MCNC: 0.9 MG/DL (ref 0.8–1.3)
EKG ATRIAL RATE: 56 BPM
EKG DIAGNOSIS: NORMAL
EKG P AXIS: 14 DEGREES
EKG P-R INTERVAL: 256 MS
EKG Q-T INTERVAL: 440 MS
EKG QRS DURATION: 98 MS
EKG QTC CALCULATION (BAZETT): 424 MS
EKG R AXIS: -21 DEGREES
EKG T AXIS: 16 DEGREES
EKG VENTRICULAR RATE: 56 BPM
GFR SERPL CREATININE-BSD FRML MDRD: >60 ML/MIN/{1.73_M2}
GLUCOSE BLD-MCNC: 78 MG/DL (ref 70–99)
POTASSIUM REFLEX MAGNESIUM: 4 MMOL/L (ref 3.5–5.1)
SODIUM BLD-SCNC: 138 MMOL/L (ref 136–145)
TOTAL PROTEIN: 6.2 G/DL (ref 6.4–8.2)

## 2023-01-04 PROCEDURE — 3700000001 HC ADD 15 MINUTES (ANESTHESIA): Performed by: STUDENT IN AN ORGANIZED HEALTH CARE EDUCATION/TRAINING PROGRAM

## 2023-01-04 PROCEDURE — 3700000000 HC ANESTHESIA ATTENDED CARE: Performed by: STUDENT IN AN ORGANIZED HEALTH CARE EDUCATION/TRAINING PROGRAM

## 2023-01-04 PROCEDURE — APPNB30 APP NON BILLABLE TIME 0-30 MINS: Performed by: NURSE PRACTITIONER

## 2023-01-04 PROCEDURE — 04V03DZ RESTRICTION OF ABDOMINAL AORTA WITH INTRALUMINAL DEVICE, PERCUTANEOUS APPROACH: ICD-10-PCS | Performed by: STUDENT IN AN ORGANIZED HEALTH CARE EDUCATION/TRAINING PROGRAM

## 2023-01-04 PROCEDURE — 87040 BLOOD CULTURE FOR BACTERIA: CPT

## 2023-01-04 PROCEDURE — 3600000017 HC SURGERY HYBRID ADDL 15MIN: Performed by: STUDENT IN AN ORGANIZED HEALTH CARE EDUCATION/TRAINING PROGRAM

## 2023-01-04 PROCEDURE — 6360000002 HC RX W HCPCS: Performed by: ANESTHESIOLOGY

## 2023-01-04 PROCEDURE — 6370000000 HC RX 637 (ALT 250 FOR IP): Performed by: STUDENT IN AN ORGANIZED HEALTH CARE EDUCATION/TRAINING PROGRAM

## 2023-01-04 PROCEDURE — A4216 STERILE WATER/SALINE, 10 ML: HCPCS

## 2023-01-04 PROCEDURE — C1894 INTRO/SHEATH, NON-LASER: HCPCS | Performed by: STUDENT IN AN ORGANIZED HEALTH CARE EDUCATION/TRAINING PROGRAM

## 2023-01-04 PROCEDURE — 6360000002 HC RX W HCPCS: Performed by: NURSE ANESTHETIST, CERTIFIED REGISTERED

## 2023-01-04 PROCEDURE — C1768 GRAFT, VASCULAR: HCPCS | Performed by: STUDENT IN AN ORGANIZED HEALTH CARE EDUCATION/TRAINING PROGRAM

## 2023-01-04 PROCEDURE — C1781 MESH (IMPLANTABLE): HCPCS | Performed by: STUDENT IN AN ORGANIZED HEALTH CARE EDUCATION/TRAINING PROGRAM

## 2023-01-04 PROCEDURE — 2500000003 HC RX 250 WO HCPCS

## 2023-01-04 PROCEDURE — 6360000002 HC RX W HCPCS

## 2023-01-04 PROCEDURE — 04CK3ZZ EXTIRPATION OF MATTER FROM RIGHT FEMORAL ARTERY, PERCUTANEOUS APPROACH: ICD-10-PCS | Performed by: STUDENT IN AN ORGANIZED HEALTH CARE EDUCATION/TRAINING PROGRAM

## 2023-01-04 PROCEDURE — 2580000003 HC RX 258: Performed by: NURSE ANESTHETIST, CERTIFIED REGISTERED

## 2023-01-04 PROCEDURE — 2580000003 HC RX 258: Performed by: INTERNAL MEDICINE

## 2023-01-04 PROCEDURE — C1889 IMPLANT/INSERT DEVICE, NOC: HCPCS | Performed by: STUDENT IN AN ORGANIZED HEALTH CARE EDUCATION/TRAINING PROGRAM

## 2023-01-04 PROCEDURE — 2500000003 HC RX 250 WO HCPCS: Performed by: INTERNAL MEDICINE

## 2023-01-04 PROCEDURE — C1760 CLOSURE DEV, VASC: HCPCS | Performed by: STUDENT IN AN ORGANIZED HEALTH CARE EDUCATION/TRAINING PROGRAM

## 2023-01-04 PROCEDURE — 93306 TTE W/DOPPLER COMPLETE: CPT

## 2023-01-04 PROCEDURE — 6360000002 HC RX W HCPCS: Performed by: STUDENT IN AN ORGANIZED HEALTH CARE EDUCATION/TRAINING PROGRAM

## 2023-01-04 PROCEDURE — 2720000010 HC SURG SUPPLY STERILE: Performed by: STUDENT IN AN ORGANIZED HEALTH CARE EDUCATION/TRAINING PROGRAM

## 2023-01-04 PROCEDURE — 04L93DZ OCCLUSION OF RIGHT RENAL ARTERY WITH INTRALUMINAL DEVICE, PERCUTANEOUS APPROACH: ICD-10-PCS | Performed by: STUDENT IN AN ORGANIZED HEALTH CARE EDUCATION/TRAINING PROGRAM

## 2023-01-04 PROCEDURE — 2709999900 HC NON-CHARGEABLE SUPPLY: Performed by: STUDENT IN AN ORGANIZED HEALTH CARE EDUCATION/TRAINING PROGRAM

## 2023-01-04 PROCEDURE — 047K3ZZ DILATION OF RIGHT FEMORAL ARTERY, PERCUTANEOUS APPROACH: ICD-10-PCS | Performed by: STUDENT IN AN ORGANIZED HEALTH CARE EDUCATION/TRAINING PROGRAM

## 2023-01-04 PROCEDURE — 3600000007 HC SURGERY HYBRID BASE: Performed by: STUDENT IN AN ORGANIZED HEALTH CARE EDUCATION/TRAINING PROGRAM

## 2023-01-04 PROCEDURE — 36620 INSERTION CATHETER ARTERY: CPT | Performed by: ANESTHESIOLOGY

## 2023-01-04 PROCEDURE — A4217 STERILE WATER/SALINE, 500 ML: HCPCS | Performed by: STUDENT IN AN ORGANIZED HEALTH CARE EDUCATION/TRAINING PROGRAM

## 2023-01-04 PROCEDURE — 6360000004 HC RX CONTRAST MEDICATION: Performed by: STUDENT IN AN ORGANIZED HEALTH CARE EDUCATION/TRAINING PROGRAM

## 2023-01-04 PROCEDURE — 2500000003 HC RX 250 WO HCPCS: Performed by: NURSE ANESTHETIST, CERTIFIED REGISTERED

## 2023-01-04 PROCEDURE — 04UY3KZ SUPPLEMENT LOWER ARTERY WITH NONAUTOLOGOUS TISSUE SUBSTITUTE, PERCUTANEOUS APPROACH: ICD-10-PCS | Performed by: STUDENT IN AN ORGANIZED HEALTH CARE EDUCATION/TRAINING PROGRAM

## 2023-01-04 PROCEDURE — C1887 CATHETER, GUIDING: HCPCS | Performed by: STUDENT IN AN ORGANIZED HEALTH CARE EDUCATION/TRAINING PROGRAM

## 2023-01-04 PROCEDURE — C1769 GUIDE WIRE: HCPCS | Performed by: STUDENT IN AN ORGANIZED HEALTH CARE EDUCATION/TRAINING PROGRAM

## 2023-01-04 PROCEDURE — 2580000003 HC RX 258: Performed by: STUDENT IN AN ORGANIZED HEALTH CARE EDUCATION/TRAINING PROGRAM

## 2023-01-04 PROCEDURE — 36415 COLL VENOUS BLD VENIPUNCTURE: CPT

## 2023-01-04 PROCEDURE — 94760 N-INVAS EAR/PLS OXIMETRY 1: CPT

## 2023-01-04 PROCEDURE — 6360000002 HC RX W HCPCS: Performed by: INTERNAL MEDICINE

## 2023-01-04 PROCEDURE — 93010 ELECTROCARDIOGRAM REPORT: CPT | Performed by: INTERNAL MEDICINE

## 2023-01-04 PROCEDURE — 93005 ELECTROCARDIOGRAM TRACING: CPT | Performed by: INTERNAL MEDICINE

## 2023-01-04 PROCEDURE — 80053 COMPREHEN METABOLIC PANEL: CPT

## 2023-01-04 PROCEDURE — 2580000003 HC RX 258

## 2023-01-04 PROCEDURE — A4216 STERILE WATER/SALINE, 10 ML: HCPCS | Performed by: NURSE ANESTHETIST, CERTIFIED REGISTERED

## 2023-01-04 PROCEDURE — C2628 CATHETER, OCCLUSION: HCPCS | Performed by: STUDENT IN AN ORGANIZED HEALTH CARE EDUCATION/TRAINING PROGRAM

## 2023-01-04 PROCEDURE — 2100000000 HC CCU R&B

## 2023-01-04 DEVICE — EXCLUDER AAA ENDO CONTRA LEG 16MMX27MMX10CM 15FR
Type: IMPLANTABLE DEVICE | Site: GROIN | Status: FUNCTIONAL
Brand: GORE EXCLUDER AAA ENDOPROSTHESIS

## 2023-01-04 DEVICE — AZUR
Type: IMPLANTABLE DEVICE | Site: GROIN | Status: FUNCTIONAL
Brand: AZUR CX DETACHABLE

## 2023-01-04 DEVICE — EXCLUDER CONFORM AAA TRNK ENDO 36MMX14.5MMX14CM 18FR
Type: IMPLANTABLE DEVICE | Site: GROIN | Status: FUNCTIONAL
Brand: GORE EXCLUDER CONF AAA ENDO - TRUNK-IPSILATERAL

## 2023-01-04 DEVICE — XENOSURE BIOLOGIC PATCH, 2CM X 9CM, EIFU
Type: IMPLANTABLE DEVICE | Site: GROIN | Status: FUNCTIONAL
Brand: XENOSURE BIOLOGIC PATCH

## 2023-01-04 RX ORDER — ENOXAPARIN SODIUM 100 MG/ML
40 INJECTION SUBCUTANEOUS DAILY
Status: DISCONTINUED | OUTPATIENT
Start: 2023-01-05 | End: 2023-01-05 | Stop reason: HOSPADM

## 2023-01-04 RX ORDER — PROTAMINE SULFATE 10 MG/ML
INJECTION, SOLUTION INTRAVENOUS PRN
Status: DISCONTINUED | OUTPATIENT
Start: 2023-01-04 | End: 2023-01-04 | Stop reason: SDUPTHER

## 2023-01-04 RX ORDER — PHENYLEPHRINE HCL IN 0.9% NACL 1 MG/10 ML
SYRINGE (ML) INTRAVENOUS PRN
Status: DISCONTINUED | OUTPATIENT
Start: 2023-01-04 | End: 2023-01-04 | Stop reason: SDUPTHER

## 2023-01-04 RX ORDER — ROCURONIUM BROMIDE 10 MG/ML
INJECTION, SOLUTION INTRAVENOUS PRN
Status: DISCONTINUED | OUTPATIENT
Start: 2023-01-04 | End: 2023-01-04 | Stop reason: SDUPTHER

## 2023-01-04 RX ORDER — FENTANYL CITRATE 50 UG/ML
25 INJECTION, SOLUTION INTRAMUSCULAR; INTRAVENOUS EVERY 5 MIN PRN
Status: DISCONTINUED | OUTPATIENT
Start: 2023-01-04 | End: 2023-01-05

## 2023-01-04 RX ORDER — SODIUM CHLORIDE 9 MG/ML
INJECTION INTRAVENOUS PRN
Status: DISCONTINUED | OUTPATIENT
Start: 2023-01-04 | End: 2023-01-04 | Stop reason: SDUPTHER

## 2023-01-04 RX ORDER — PROPOFOL 10 MG/ML
INJECTION, EMULSION INTRAVENOUS PRN
Status: DISCONTINUED | OUTPATIENT
Start: 2023-01-04 | End: 2023-01-04 | Stop reason: SDUPTHER

## 2023-01-04 RX ORDER — ONDANSETRON 2 MG/ML
4 INJECTION INTRAMUSCULAR; INTRAVENOUS
Status: DISCONTINUED | OUTPATIENT
Start: 2023-01-04 | End: 2023-01-05

## 2023-01-04 RX ORDER — ASPIRIN 81 MG/1
81 TABLET ORAL DAILY
Status: DISCONTINUED | OUTPATIENT
Start: 2023-01-04 | End: 2023-01-05 | Stop reason: HOSPADM

## 2023-01-04 RX ORDER — FENTANYL CITRATE 50 UG/ML
INJECTION, SOLUTION INTRAMUSCULAR; INTRAVENOUS PRN
Status: DISCONTINUED | OUTPATIENT
Start: 2023-01-04 | End: 2023-01-04 | Stop reason: SDUPTHER

## 2023-01-04 RX ORDER — SODIUM CHLORIDE 9 MG/ML
INJECTION, SOLUTION INTRAVENOUS CONTINUOUS PRN
Status: DISCONTINUED | OUTPATIENT
Start: 2023-01-04 | End: 2023-01-04 | Stop reason: SDUPTHER

## 2023-01-04 RX ORDER — SODIUM CHLORIDE 9 MG/ML
INJECTION, SOLUTION INTRAVENOUS PRN
Status: DISCONTINUED | OUTPATIENT
Start: 2023-01-04 | End: 2023-01-05

## 2023-01-04 RX ORDER — MAGNESIUM HYDROXIDE 1200 MG/15ML
LIQUID ORAL CONTINUOUS PRN
Status: COMPLETED | OUTPATIENT
Start: 2023-01-04 | End: 2023-01-04

## 2023-01-04 RX ORDER — SODIUM CHLORIDE 0.9 % (FLUSH) 0.9 %
5-40 SYRINGE (ML) INJECTION PRN
Status: DISCONTINUED | OUTPATIENT
Start: 2023-01-04 | End: 2023-01-05

## 2023-01-04 RX ORDER — SODIUM CHLORIDE 0.9 % (FLUSH) 0.9 %
5-40 SYRINGE (ML) INJECTION EVERY 12 HOURS SCHEDULED
Status: DISCONTINUED | OUTPATIENT
Start: 2023-01-04 | End: 2023-01-05

## 2023-01-04 RX ORDER — SODIUM CHLORIDE 9 MG/ML
INJECTION, SOLUTION INTRAVENOUS CONTINUOUS
Status: DISCONTINUED | OUTPATIENT
Start: 2023-01-04 | End: 2023-01-05

## 2023-01-04 RX ORDER — EPHEDRINE SULFATE/0.9% NACL/PF 50 MG/5 ML
SYRINGE (ML) INTRAVENOUS PRN
Status: DISCONTINUED | OUTPATIENT
Start: 2023-01-04 | End: 2023-01-04 | Stop reason: SDUPTHER

## 2023-01-04 RX ORDER — CEFAZOLIN SODIUM 1 G/3ML
INJECTION, POWDER, FOR SOLUTION INTRAMUSCULAR; INTRAVENOUS PRN
Status: DISCONTINUED | OUTPATIENT
Start: 2023-01-04 | End: 2023-01-04 | Stop reason: SDUPTHER

## 2023-01-04 RX ORDER — HEPARIN SODIUM 10000 [USP'U]/ML
INJECTION, SOLUTION INTRAVENOUS; SUBCUTANEOUS PRN
Status: DISCONTINUED | OUTPATIENT
Start: 2023-01-04 | End: 2023-01-04 | Stop reason: SDUPTHER

## 2023-01-04 RX ORDER — FENTANYL CITRATE 50 UG/ML
50 INJECTION, SOLUTION INTRAMUSCULAR; INTRAVENOUS EVERY 5 MIN PRN
Status: DISCONTINUED | OUTPATIENT
Start: 2023-01-04 | End: 2023-01-05

## 2023-01-04 RX ORDER — DEXAMETHASONE SODIUM PHOSPHATE 4 MG/ML
INJECTION, SOLUTION INTRA-ARTICULAR; INTRALESIONAL; INTRAMUSCULAR; INTRAVENOUS; SOFT TISSUE PRN
Status: DISCONTINUED | OUTPATIENT
Start: 2023-01-04 | End: 2023-01-04 | Stop reason: SDUPTHER

## 2023-01-04 RX ORDER — SUCCINYLCHOLINE/SOD CL,ISO/PF 200MG/10ML
SYRINGE (ML) INTRAVENOUS PRN
Status: DISCONTINUED | OUTPATIENT
Start: 2023-01-04 | End: 2023-01-04 | Stop reason: SDUPTHER

## 2023-01-04 RX ORDER — MEPERIDINE HYDROCHLORIDE 50 MG/ML
12.5 INJECTION INTRAMUSCULAR; INTRAVENOUS; SUBCUTANEOUS
Status: DISCONTINUED | OUTPATIENT
Start: 2023-01-04 | End: 2023-01-05

## 2023-01-04 RX ORDER — ONDANSETRON 2 MG/ML
INJECTION INTRAMUSCULAR; INTRAVENOUS PRN
Status: DISCONTINUED | OUTPATIENT
Start: 2023-01-04 | End: 2023-01-04 | Stop reason: SDUPTHER

## 2023-01-04 RX ORDER — MIDAZOLAM HYDROCHLORIDE 1 MG/ML
INJECTION INTRAMUSCULAR; INTRAVENOUS PRN
Status: DISCONTINUED | OUTPATIENT
Start: 2023-01-04 | End: 2023-01-04 | Stop reason: SDUPTHER

## 2023-01-04 RX ORDER — LIDOCAINE HYDROCHLORIDE 20 MG/ML
INJECTION, SOLUTION EPIDURAL; INFILTRATION; INTRACAUDAL; PERINEURAL PRN
Status: DISCONTINUED | OUTPATIENT
Start: 2023-01-04 | End: 2023-01-04 | Stop reason: SDUPTHER

## 2023-01-04 RX ADMIN — SODIUM CHLORIDE, PRESERVATIVE FREE 10 ML: 5 INJECTION INTRAVENOUS at 08:50

## 2023-01-04 RX ADMIN — SODIUM CHLORIDE, PRESERVATIVE FREE 10 ML: 5 INJECTION INTRAVENOUS at 20:36

## 2023-01-04 RX ADMIN — Medication 100 MCG: at 11:58

## 2023-01-04 RX ADMIN — Medication 100 MCG: at 12:54

## 2023-01-04 RX ADMIN — FENTANYL CITRATE 25 MCG: 50 INJECTION INTRAMUSCULAR; INTRAVENOUS at 15:05

## 2023-01-04 RX ADMIN — ONDANSETRON 4 MG: 2 INJECTION INTRAMUSCULAR; INTRAVENOUS at 14:36

## 2023-01-04 RX ADMIN — HEPARIN SODIUM 2000 UNITS: 10000 INJECTION INTRAVENOUS; SUBCUTANEOUS at 13:26

## 2023-01-04 RX ADMIN — ROCURONIUM BROMIDE 30 MG: 10 INJECTION INTRAVENOUS at 13:06

## 2023-01-04 RX ADMIN — Medication 5 MG: at 12:13

## 2023-01-04 RX ADMIN — Medication 5 MG: at 12:30

## 2023-01-04 RX ADMIN — LIDOCAINE HYDROCHLORIDE 80 MG: 20 INJECTION, SOLUTION EPIDURAL; INFILTRATION; INTRACAUDAL; PERINEURAL at 11:51

## 2023-01-04 RX ADMIN — Medication 100 MCG: at 13:39

## 2023-01-04 RX ADMIN — Medication 100 MCG: at 13:11

## 2023-01-04 RX ADMIN — SODIUM CHLORIDE: 9 INJECTION, SOLUTION INTRAVENOUS at 11:45

## 2023-01-04 RX ADMIN — SODIUM CHLORIDE: 9 INJECTION, SOLUTION INTRAVENOUS at 11:30

## 2023-01-04 RX ADMIN — Medication 5 MG: at 12:28

## 2023-01-04 RX ADMIN — ASPIRIN 81 MG: 81 TABLET, COATED ORAL at 17:12

## 2023-01-04 RX ADMIN — HEPARIN SODIUM 9000 UNITS: 10000 INJECTION INTRAVENOUS; SUBCUTANEOUS at 12:26

## 2023-01-04 RX ADMIN — PROTAMINE SULFATE 20 MG: 10 INJECTION, SOLUTION INTRAVENOUS at 14:44

## 2023-01-04 RX ADMIN — FENTANYL CITRATE 25 MCG: 50 INJECTION INTRAMUSCULAR; INTRAVENOUS at 12:19

## 2023-01-04 RX ADMIN — FENTANYL CITRATE 50 MCG: 50 INJECTION INTRAMUSCULAR; INTRAVENOUS at 11:30

## 2023-01-04 RX ADMIN — Medication 100 MCG: at 13:02

## 2023-01-04 RX ADMIN — SODIUM CHLORIDE: 9 INJECTION, SOLUTION INTRAVENOUS at 14:23

## 2023-01-04 RX ADMIN — ROCURONIUM BROMIDE 50 MG: 10 INJECTION INTRAVENOUS at 12:00

## 2023-01-04 RX ADMIN — MORPHINE SULFATE 2 MG: 2 INJECTION, SOLUTION INTRAMUSCULAR; INTRAVENOUS at 06:29

## 2023-01-04 RX ADMIN — Medication 100 MCG: at 14:03

## 2023-01-04 RX ADMIN — Medication 100 MCG: at 12:01

## 2023-01-04 RX ADMIN — CEFAZOLIN 2 G: 1 INJECTION, POWDER, FOR SOLUTION INTRAMUSCULAR; INTRAVENOUS at 12:08

## 2023-01-04 RX ADMIN — FENTANYL CITRATE 50 MCG: 50 INJECTION, SOLUTION INTRAMUSCULAR; INTRAVENOUS at 16:21

## 2023-01-04 RX ADMIN — DEXAMETHASONE SODIUM PHOSPHATE 8 MG: 4 INJECTION, SOLUTION INTRAMUSCULAR; INTRAVENOUS at 12:23

## 2023-01-04 RX ADMIN — Medication 100 MCG: at 12:26

## 2023-01-04 RX ADMIN — Medication 100 MCG: at 12:47

## 2023-01-04 RX ADMIN — MORPHINE SULFATE 4 MG: 4 INJECTION, SOLUTION INTRAMUSCULAR; INTRAVENOUS at 08:50

## 2023-01-04 RX ADMIN — HYDROMORPHONE HYDROCHLORIDE 0.5 MG: 1 INJECTION, SOLUTION INTRAMUSCULAR; INTRAVENOUS; SUBCUTANEOUS at 20:35

## 2023-01-04 RX ADMIN — ROCURONIUM BROMIDE 20 MG: 10 INJECTION INTRAVENOUS at 14:24

## 2023-01-04 RX ADMIN — Medication 100 MCG: at 12:04

## 2023-01-04 RX ADMIN — SODIUM CHLORIDE 7.5 MG/HR: 9 INJECTION, SOLUTION INTRAVENOUS at 08:22

## 2023-01-04 RX ADMIN — Medication 100 MCG: at 11:54

## 2023-01-04 RX ADMIN — Medication 100 MCG: at 13:18

## 2023-01-04 RX ADMIN — Medication 5 MG: at 12:09

## 2023-01-04 RX ADMIN — MIDAZOLAM 1 MG: 1 INJECTION INTRAMUSCULAR; INTRAVENOUS at 11:30

## 2023-01-04 RX ADMIN — Medication 100 MCG: at 14:11

## 2023-01-04 RX ADMIN — Medication 100 MCG: at 13:32

## 2023-01-04 RX ADMIN — Medication 100 MCG: at 12:10

## 2023-01-04 RX ADMIN — SODIUM CHLORIDE 10 MG/HR: 9 INJECTION, SOLUTION INTRAVENOUS at 05:02

## 2023-01-04 RX ADMIN — SUGAMMADEX 200 MG: 100 INJECTION, SOLUTION INTRAVENOUS at 15:00

## 2023-01-04 RX ADMIN — Medication 100 MG: at 11:51

## 2023-01-04 RX ADMIN — PROPOFOL 120 MG: 10 INJECTION, EMULSION INTRAVENOUS at 11:51

## 2023-01-04 RX ADMIN — Medication 100 MCG: at 13:50

## 2023-01-04 RX ADMIN — SODIUM CHLORIDE 10 ML: 9 INJECTION, SOLUTION INTRAMUSCULAR; INTRAVENOUS; SUBCUTANEOUS at 11:45

## 2023-01-04 RX ADMIN — CEFAZOLIN 2000 MG: 2 INJECTION, POWDER, FOR SOLUTION INTRAMUSCULAR; INTRAVENOUS at 20:28

## 2023-01-04 RX ADMIN — Medication 100 MCG: at 12:07

## 2023-01-04 RX ADMIN — SODIUM CHLORIDE: 9 INJECTION, SOLUTION INTRAVENOUS at 15:38

## 2023-01-04 ASSESSMENT — PAIN DESCRIPTION - ORIENTATION
ORIENTATION: RIGHT
ORIENTATION: LEFT
ORIENTATION: LEFT
ORIENTATION: RIGHT

## 2023-01-04 ASSESSMENT — PAIN DESCRIPTION - DESCRIPTORS
DESCRIPTORS: ACHING;SORE
DESCRIPTORS: ACHING;SORE
DESCRIPTORS: SHOOTING;STABBING

## 2023-01-04 ASSESSMENT — LIFESTYLE VARIABLES: SMOKING_STATUS: 1

## 2023-01-04 ASSESSMENT — PAIN DESCRIPTION - PAIN TYPE: TYPE: ACUTE PAIN

## 2023-01-04 ASSESSMENT — ENCOUNTER SYMPTOMS
ABDOMINAL PAIN: 1
RESPIRATORY NEGATIVE: 1

## 2023-01-04 ASSESSMENT — PAIN DESCRIPTION - LOCATION
LOCATION: GROIN
LOCATION: INCISION;GROIN
LOCATION: ABDOMEN
LOCATION: ABDOMEN;BACK

## 2023-01-04 ASSESSMENT — PAIN SCALES - GENERAL
PAINLEVEL_OUTOF10: 7
PAINLEVEL_OUTOF10: 0
PAINLEVEL_OUTOF10: 7
PAINLEVEL_OUTOF10: 4
PAINLEVEL_OUTOF10: 0
PAINLEVEL_OUTOF10: 7
PAINLEVEL_OUTOF10: 7

## 2023-01-04 ASSESSMENT — PAIN DESCRIPTION - ONSET: ONSET: ON-GOING

## 2023-01-04 ASSESSMENT — PAIN - FUNCTIONAL ASSESSMENT: PAIN_FUNCTIONAL_ASSESSMENT: ACTIVITIES ARE NOT PREVENTED

## 2023-01-04 ASSESSMENT — PAIN DESCRIPTION - FREQUENCY: FREQUENCY: CONTINUOUS

## 2023-01-04 NOTE — PROGRESS NOTES
4 Eyes Skin Assessment     NAME:  Shaka Ivey Sr  YOB: 1950  MEDICAL RECORD NUMBER:  [id number]    The patient is being assessed for  Admission    I agree that One RN have performed a thorough Head to Toe Skin Assessment on the patient. ALL assessment sites listed below have been assessed. Areas assessed by both nurses:    Head, Face, Ears, Shoulders, Back, Chest, Arms, Elbows, Hands, Sacrum. Buttock, Coccyx, Ischium, and Legs. Feet and Heels        Does the Patient have a Wound?  No noted wound(s)       Ashok Prevention initiated by RN: No   Wound Care Orders initiated by RN: No    Pressure Injury (Stage 3,4, Unstageable, DTI, NWPT, and Complex wounds) if present place referral order by RN under : No    New and Established Ostomies, if present place, referral order under : NA      Nurse 1 eSignature: Electronically signed by Awais Hagen RN on 1/4/23 at 6:44 AM EST    **SHARE this note so that the co-signing nurse is able to place an eSignature**    Nurse 2 eSignature: Electronically signed by Marcos Goldmann, RN on 1/4/23 at 6:45 AM EST

## 2023-01-04 NOTE — H&P
Hospital Medicine History & Physical      PCP: Kacey Hernandez MD    Date of Admission: 1/3/2023    Date of Service: Pt seen/examined on 1/3/2023   and Admitted to Inpatient with expected LOS greater than two midnights due to medical therapy    Chief Complaint: Abdominal pain      History Of Present Illness:      81 y.o. male who presented to [facility] [place] with back and abdominal pain. Patient was sent here from primary care physician's office after patient was found to have an abdominal aortic aneurysm on CT scan. Patient has been plagued with nagging abdominal pain for the last month and has been reluctant to go to PCP. Patient described pain as dull in the mid stomach and radiation to the back. It has become progressively worse and he finally agreed to seek medical attention. Patient was seen by primary care physician and had CT imaging which showed a large abdominal aortic aneurysm and he was urged to come to the hospital for care    Patient admits to some nausea along with the abdominal pain. He does smoke heavily and states that he he has been compliant with his blood pressure but has had some issues with its control in the past      Past Medical History:          Diagnosis Date    Hyperlipidemia     Hypertension     PAD (peripheral artery disease) (HCC)     stenting in left leg        Past Surgical History:          Procedure Laterality Date    CHOLECYSTECTOMY      SHOULDER SURGERY Right     rotator cuff    TOE SURGERY Left        Medications Prior to Admission:      Prior to Admission medications    Medication Sig Start Date End Date Taking?  Authorizing Provider   predniSONE (DELTASONE) 20 MG tablet Take 1 tablet by mouth daily for 5 days 1/3/23 1/8/23  LELE Choi NP   tiZANidine (ZANAFLEX) 2 MG tablet Take 1 tablet by mouth 3 times daily as needed (low back pain) 1/3/23   LELE Choi NP   atorvastatin (LIPITOR) 20 MG tablet TAKE 1 TABLET BY MOUTH DAILY 10/17/22 Lucia Aden MD   tamsulosin (FLOMAX) 0.4 MG capsule TAKE 1 CAPSULE BY MOUTH DAILY 10/17/22   Lucia Aden MD   albuterol sulfate HFA (VENTOLIN HFA) 108 (90 Base) MCG/ACT inhaler Inhale 2 puffs into the lungs 4 times daily as needed for Wheezing 4/18/22   Lucia Aden MD   lisinopril (PRINIVIL;ZESTRIL) 20 MG tablet TAKE 1 TABLET EVERY DAY 4/18/22   Lucia Aden MD   vitamin B-12 (CYANOCOBALAMIN) 500 MCG tablet Take 1 tablet by mouth daily 8/20/20   Lucia Aden MD       Allergies:  Morphine    Social History:      The patient currently lives with family in [address]:   reports that he has been smoking cigarettes. He started smoking about 55 years ago. He has a 9.50 pack-year smoking history. He has never used smokeless tobacco.  ETOH:   reports current alcohol use. E-cigarette/Vaping       Questions Responses    E-cigarette/Vaping Use Never User    Start Date     Passive Exposure     Quit Date     Counseling Given     Comments               Family History:       Reviewed and negative in regards to presenting illness/complaint. Problem Relation Age of Onset    Cancer Mother         uterine cancer     Other Father         MVA    Cancer Sister         breast    Cancer Brother         brain     Cancer Maternal Aunt         breast    No Known Problems Maternal Grandmother     No Known Problems Maternal Grandfather     No Known Problems Paternal Grandmother     No Known Problems Paternal Grandfather        REVIEW OF SYSTEMS COMPLETED:   Pertinent positives as noted in the HPI. All other systems reviewed and negative. PHYSICAL EXAM PERFORMED:    /78   Pulse 64   Temp 97.5 °F (36.4 °C) (Oral)   Resp 16   Ht 6' 2\" (1.88 m)   Wt 193 lb 12.6 oz (87.9 kg)   SpO2 95%   BMI 24.88 kg/m²     General appearance:  No apparent distress, appears stated age and cooperative. HEENT:  Normal cephalic, atraumatic without obvious deformity. Pupils equal, round, and reactive to light. Extra ocular muscles intact. Conjunctivae/corneas clear. Neck: Supple, with full range of motion. No jugular venous distention. Trachea midline. Respiratory:  Normal respiratory effort. Clear to auscultation, bilaterally without Rales/Wheezes/Rhonchi. Cardiovascular:  Regular rate and rhythm with normal S1/S2 without murmurs, rubs or gallops. Abdomen: Soft, mild midepigastric tenderness. Positive pulsatile mass  Musculoskeletal:  No clubbing, cyanosis or edema bilaterally. Full range of motion without deformity. Skin: Skin color, texture, turgor normal.  No rashes or lesions. Neurologic:  Neurovascularly intact without any focal sensory/motor deficits. Cranial nerves: II-XII intact, grossly non-focal.  Psychiatric:  Alert and oriented, thought content appropriate, normal insight  Capillary Refill: Brisk,3 seconds, normal  Peripheral Pulses: +2 palpable, equal bilaterally       Labs:     Recent Labs     01/03/23 1653 01/03/23 2015   WBC 11.1* 11.8*   HGB 13.6 13.4*   HCT 40.4* 40.5    242     Recent Labs     01/03/23 1653 01/03/23 2015    135*   K 4.8 3.6    100   CO2 25 25   BUN 13 11   CREATININE 0.9  1.0 1.1   CALCIUM 9.4 9.7     Recent Labs     01/03/23 1653 01/03/23 2015   AST 21 13*   ALT <5* <5*   BILITOT 0.5 0.5   ALKPHOS 99 97     Recent Labs     01/03/23 2015   INR 1.02     No results for input(s): Tanika Gordon in the last 72 hours. Urinalysis:      Lab Results   Component Value Date/Time    BLOODU MODERATE 08/08/2019 09:04 AM    SPECGRAV 1.025 08/08/2019 09:04 AM    GLUCOSEU NEG 08/08/2019 09:04 AM       Radiology:     Independently reviewed by me  No orders to display     CT scan abdomen pelvis 1/3/2023    Atherosclerotic abdominal aorta with a moderate 6.5 cm fusiform aneurysm of   the infrarenal aorta. There is moderate mural thrombus and plaque   surrounding the aneurysm with a patent central lumen.   There is focal wall   irregularity posteriorly and inferiorly along the aneurysm with a 1.7 cm   hypodensity extending along the left psoas muscle which represent a small   pseudoaneurysm with no contrast extravasation. There is some mild stranding   and edema along the wall of the aneurysm anteriorly which could represent   aortitis or possible very early minimal leaking around the aneurysm but is   otherwise of uncertain etiology. Recommend vascular surgery consultation. Mild chronic liver changes with fatty replacement throughout and a 1.8 cm   subcapsular hyperdensity along the right lobe laterally which could represent   geographic fatty replacement or possible early lesion in the liver. Recommend follow-up or MRI correlation. Moderate diverticulosis of the left colon with no pericolonic inflammation. Mild prostatic enlargement with diffuse mild bladder wall thickening which   may just be due to poor distention but is indeterminate. Recommend follow-up   urological follow-up. Surgical clips along the suprapubic region. Moderate dilatation of the visualized ascending aorta measuring up to 5 cm. Recommend follow-up. The findings were called to Dr. Kassandra Guzman at 6:30 p.m. Consults:    IP CONSULT TO VASCULAR SURGERY  IP CONSULT TO HOSPITALIST  IP CONSULT TO VASCULAR SURGERY    ASSESSMENT:    Abdominal aortic aneurysm 6.5 cm  Hypertension  Tobacco abuse  PLAN:    Patient with large AAA.   Unclear duration but has had progressive symptoms  Will admit to the ICU  Aggressive blood pressure control have started Cardene drip with goal of systolic blood pressure less than 105  Labetalol IV as needed  Vascular surgery consulted  NPO for now  Have held all blood thinners  Will likely require vascular intervention soon  Morphine IV for pain control  Hyperlipidemia    DVT Prophylaxis: SCDs  Diet: Diet NPO  Code Status: Full Code      Dispo - 4-6 d       Juan C Lee MD    Thank you Eric García MD for the opportunity to be involved in this patient's care. If you have any questions or concerns please feel free to contact me at [phone].

## 2023-01-04 NOTE — ANESTHESIA POSTPROCEDURE EVALUATION
Department of Anesthesiology  Postprocedure Note    Patient: Rachel Calderon  MRN: [id number]  YOB: 1950  Date of evaluation: 1/4/2023      Procedure Summary     Date: 01/04/23 Room / Location: [address] [place] [address]    Anesthesia Start: 3893 Anesthesia Stop: 8290    Procedures:       ENDOVASCULAR ABDOMINAL AORTIC ANEURYSM REPAIR, EMBOLIZATION OF RENAL ARTERY (Abdomen)      RIGHT FEMORAL ENDARTERECTOMY WITH PATCH (Right: Groin) Diagnosis:       Aneurysm of infrarenal abdominal aorta, unspecified whether ruptured      (ANEURYSM OF INFRARENAL ABDOMINAL AORTA)    Surgeons: Darrian Mcgowan DO Responsible Provider: Gretchen Andrade MD    Anesthesia Type: general ASA Status: 3          Anesthesia Type: No value filed.     Keon Phase I:      Keon Phase II:        Anesthesia Post Evaluation    Level of consciousness: awake  Airway patency: patent  Nausea & Vomiting: no nausea and no vomiting  Complications: no  Cardiovascular status: hemodynamically stable  Respiratory status: acceptable  Hydration status: stable

## 2023-01-04 NOTE — ED PROVIDER NOTES
[facility]  eMERGENCY dEPARTMENT eNCOUnter      Pt Name: Syd Jones  MRN: [id number]  [place] 1950  Date of evaluation: 1/3/2023  Provider: Matthias Tillman MD    [address] [place] [address]       Chief Complaint   Patient presents with    Abnormal CT     Patient told to come to ER following outpatient CT because of \"leaky\" aorta/aortic aneurysm. Per family, patient has been dealing with abdominal pain since December, he also endorses back pain in the same area as the abdominal pain. CRITICAL CARE TIME   Total Critical Care time was 0 minutes, excluding separately reportable procedures. There was a high probability of clinically significant/life threatening deterioration in the patient's condition which required my urgent intervention. HISTORY OF PRESENT ILLNESS  (Location/Symptom, Timing/Onset, Context/Setting, Quality, Duration, Modifying Factors, Severity.)   History From: Patient  Limitations to history : None    Syd Jones is a 81 y.o. male who presents to the emergency department with left-sided abdominal pain and flank/back pain. This patient was sent from CT scan. He had an outpatient CT done ordered by his primary care provider. Reportedly shows an abdominal aortic aneurysm with questionable leaking. Patient reports she has had some left-sided abdominal discomfort and left flank discomfort since early December, around the beginning of the month. He states it seems to have gotten gradually worse. He states he has a sensation that he is bloated. His son is here with him and convinced him to come in. No nausea or vomiting. No change in bowel habits. No dysuria or frequency. No syncope or dizziness      Nursing Notes were reviewed and I agree.       SCREENINGS        Isidra Coma Scale  Eye Opening: Spontaneous  Best Verbal Response: Oriented  Best Motor Response: Obeys commands  [place] Coma Scale Score: 15                RAKAN Assessment  BP: 108/73  Heart Rate: 63           REVIEW OF SYSTEMS    (2-9 systems for level 4, 10 or more for level 5)     HEENT: Negative. Cardiovascular: No chest pain. Pulmonary: No shortness of breath or cough. GI: Left sided abdominal pain and left flank pain. Sensation of bloating. No nausea or vomiting. : No frequency urgency or dysuria. Except as noted above the remainder of the review of systems was reviewed and negative. PAST MEDICAL HISTORY     Past Medical History:   Diagnosis Date    Hyperlipidemia     Hypertension     PAD (peripheral artery disease) ([facility] [address]     stenting in left leg          SURGICAL HISTORY       Past Surgical History:   Procedure Laterality Date    CHOLECYSTECTOMY      SHOULDER SURGERY Right     rotator cuff    TOE SURGERY Left          CURRENT MEDICATIONS       Previous Medications    ALBUTEROL SULFATE HFA (VENTOLIN HFA) 108 (90 BASE) MCG/ACT INHALER    Inhale 2 puffs into the lungs 4 times daily as needed for Wheezing    ATORVASTATIN (LIPITOR) 20 MG TABLET    TAKE 1 TABLET BY MOUTH DAILY    LISINOPRIL (PRINIVIL;ZESTRIL) 20 MG TABLET    TAKE 1 TABLET EVERY DAY    PREDNISONE (DELTASONE) 20 MG TABLET    Take 1 tablet by mouth daily for 5 days    TAMSULOSIN (FLOMAX) 0.4 MG CAPSULE    TAKE 1 CAPSULE BY MOUTH DAILY    TIZANIDINE (ZANAFLEX) 2 MG TABLET    Take 1 tablet by mouth 3 times daily as needed (low back pain)    VITAMIN B-12 (CYANOCOBALAMIN) 500 MCG TABLET    Take 1 tablet by mouth daily       ALLERGIES     Patient has no known allergies.     FAMILY HISTORY       Family History   Problem Relation Age of Onset    Cancer Mother         uterine cancer     Other Father         MVA    Cancer Sister         breast    Cancer Brother         brain     Cancer Maternal Aunt         breast    No Known Problems Maternal Grandmother     No Known Problems Maternal Grandfather     No Known Problems Paternal Grandmother     No Known Problems Paternal Grandfather           SOCIAL HISTORY       Social History     Socioeconomic History    Marital status: Single     Spouse name: None    Number of children: None    Years of education: None    Highest education level: None   Tobacco Use    Smoking status: Every Day     Packs/day: 0.25     Years: 38.00     Pack years: 9.50     Types: Cigarettes     Start date: 1968    Smokeless tobacco: Never    Tobacco comments:     cutting down as high as 3 pk/d to as low . 25    Vaping Use    Vaping Use: Never used   Substance and Sexual Activity    Alcohol use: Yes     Comment: rarely    Drug use: Never     Social Determinants of Health     Financial Resource Strain: Low Risk     Difficulty of Paying Living Expenses: Not hard at all   Food Insecurity: No Food Insecurity    Worried About [address] in the Last Year: Never true    [address] in the Last Year: Never true   Transportation Needs: No Transportation Needs    Lack of Transportation (Medical): No    Lack of Transportation (Non-Medical): No   Physical Activity: Inactive    Days of Exercise per Week: 7 days    Minutes of Exercise per Session: 0 min         PHYSICAL EXAM    (up to 7 for level 4, 8 or more for level 5)     ED Triage Vitals [01/03/23 2008]   BP Temp Temp Source Heart Rate Resp SpO2 Height Weight   (!) 151/94 97.5 °F (36.4 °C) Oral 71 18 -- 6' 2\" (1.88 m) 193 lb 12.6 oz (87.9 kg)       General: Alert moderately obese white male no acute distress. Head: Atraumatic and normocephalic. Eyes: No conjunctival injection. No pallor. Pupils equal round reactive. ENT: Nose clear. Oropharynx moist without erythema. Neck: Supple, nontender, no adenopathy. Heart: Regular rate and rhythm. No murmurs or gallops noted. Lungs: Breath sounds equal bilaterally and clear. Abdomen: Soft, mildly distended, there is some mild left mid and lower abdominal tenderness without guarding or rebound. No palpable masses. Bowel sounds are normal.  No flank tenderness.   : No inguinal swelling or mass.  No inguinal hernia. Femoral pulses are 2+ and symmetrical.  Skin: Warm and dry, good turgor. No pallor or cyanosis. No diaphoresis. Neuro: Awake, alert, oriented. No focal motor deficits. DIFFERENTIAL DIAGNOSIS   Differential includes but is not limited to abdominal aortic aneurysm, diverticulitis, pyelonephritis, ureterolithiasis, other. DIAGNOSTIC RESULTS     EKG: All EKG's are interpreted by Wilfrid Segura MD in the absence of a cardiologist.      RADIOLOGY:   Non-plain film images such as CT, Ultrasound and MRI are read by the radiologist. Plain radiographic images are visualized and preliminarily interpreted Wilfrid Segura MD with the below findings:    Impression   Atherosclerotic abdominal aorta with a moderate 6.5 cm fusiform aneurysm of   the infrarenal aorta. There is moderate mural thrombus and plaque   surrounding the aneurysm with a patent central lumen. There is focal wall   irregularity posteriorly and inferiorly along the aneurysm with a 1.7 cm   hypodensity extending along the left psoas muscle which represent a small   pseudoaneurysm with no contrast extravasation. There is some mild stranding   and edema along the wall of the aneurysm anteriorly which could represent   aortitis or possible very early minimal leaking around the aneurysm but is   otherwise of uncertain etiology. Recommend vascular surgery consultation. Mild chronic liver changes with fatty replacement throughout and a 1.8 cm   subcapsular hyperdensity along the right lobe laterally which could represent   geographic fatty replacement or possible early lesion in the liver. Recommend follow-up or MRI correlation. Moderate diverticulosis of the left colon with no pericolonic inflammation. Mild prostatic enlargement with diffuse mild bladder wall thickening which   may just be due to poor distention but is indeterminate. Recommend follow-up   urological follow-up. Surgical clips along the suprapubic region. Moderate dilatation of the visualized        Interpretation per the Radiologist below, if available at the time of this note:    No orders to display         ED BEDSIDE ULTRASOUND:   Performed by ED Physician - none    LABS:  Labs Reviewed   CBC WITH AUTO DIFFERENTIAL - Abnormal; Notable for the following components:       Result Value    WBC 11.8 (*)     Hemoglobin 13.4 (*)     RDW 15.9 (*)     All other components within normal limits   COMPREHENSIVE METABOLIC PANEL - Abnormal; Notable for the following components:    Sodium 135 (*)     Glucose 113 (*)     ALT <5 (*)     AST 13 (*)     All other components within normal limits   PROTIME-INR   TYPE AND SCREEN       All other labs were within normal range or not returned as of this dictation. EMERGENCY DEPARTMENT COURSE and DIFFERENTIAL DIAGNOSIS/MDM:   Vitals:    Vitals:    01/03/23 2008 01/03/23 2013 01/03/23 2030 01/03/23 2100   BP: (!) 151/94 (!) 136/94 107/72 108/73   Pulse: 71 68 67 63   Resp: 18 15 14 16   Temp: 97.5 °F (36.4 °C)      TempSrc: Oral      SpO2:  100% 100% 98%   Weight: 193 lb 12.6 oz (87.9 kg)      Height: 6' 2\" (1.88 m)          Patient was given the following medications:  Medications - No data to display          Is this patient to be included in the SEP-1 Core Measure due to severe sepsis or septic shock? No   Exclusion criteria - the patient is NOT to be included for SEP-1 Core Measure due to: Infection is not suspected    Chronic Conditions affecting care: Below   has a past medical history of Hyperlipidemia, Hypertension, and PAD (peripheral artery disease) ([facility] [address] CONSULTS: (Who and What was discussed)  IP CONSULT TO VASCULAR SURGERY  IP CONSULT TO HOSPITALIST  Discussed with Dr. Milla Amaral. Recommends BP control, admit to the ICU by Hospitalist Service and they will evaluate in the morning. 2110:  Dr. Agnieszka Rodriguez consulted via [employer] to admit ICU.     Social Determinants : None    Records Reviewed (Source): Old Records / Office notes / Stella Bowers CT abdomen / pelvis    CC/HPI Summary, DDx, ED Course, and Reassessment: She was seen and evaluated by me. He was sent over here from outpatient CT. He had a CT ordered by his primary care provider after presenting to the office with left-sided abdominal and flank pain that is been going on since the beginning of December. His CT showed findings consistent with a infrarenal abdominal aortic aneurysm. I evaluated the patient. I reviewed his CT and his CT reading. I consulted vascular surgery. Per request of vascular surgery he will be admitted to the hospitalist service to the ICU. Blood pressure will be monitored and controlled. His initial work-up was evaluated including a CBC, chemistry profile, PT/INR. Disposition Considerations (tests considered but not done, Admit vs D/C, Shared Decision Making, Pt Expectation of Test or Tx.): Patient will require admission to the intensive care unit for close monitoring of his blood pressure/vital signs and evaluation by vascular surgery. Test results, diagnosis, and treatment plan were discussed with the patient and his family. They understand the treatment plan and need for admission and are agreeable. I am the Primary Clinician of Record. PROCEDURES:  None    FINAL IMPRESSION      1. Aneurysm of infrarenal abdominal aorta, unspecified whether ruptured          DISPOSITION/PLAN   DISPOSITION Decision To Admit 01/03/2023 09:07:28 PM      PATIENT REFERRED TO:  No follow-up provider specified.     DISCHARGE MEDICATIONS:  New Prescriptions    No medications on file       (Please note that portions of this note were completed with a voice recognition program.  Efforts were made to edit the dictations but occasionally words are mis-transcribed.)    Devi Duncan MD  Attending Emergency Physician        Rashawn Foster MD  01/03/23 4324

## 2023-01-04 NOTE — CONSULTS
[facility] Vascular and Endovascular Surgery  Consultation Note    1/4/2023 8:14 AM    Chief Complaint: Abdominal Pain     Reason for Consultation: AAA - 6.5 cm    History of Present Illness:  Patient is a 81 y.o. male who presented to the ED on 1/3/2023 with complaints of Abdominal Pain. He has a significant PMH of HLD, HTN, PAD (Left Leg Stent), Soha, Right Rotator Cuff Repair and Left Toe Surgery. The patient tells me he has been experiencing abdominal pain for about 1 month now. The pain has worsened and he went to see his PCP. A CT Abdomen Pelvis was ordered and completed as an outpatient and the patient's PCP called and recommended he present to the ED. He was started on a Cardene infusion for BP control with good results. The patient reports he smokes about 0.75 PPD. We have been consulted to evaluate the patient for a 6.5 cm AAA. At present, the patient is seen resting in bed in CVU. Review of Systems  Review of Systems   Constitutional: Negative. HENT: Negative. Respiratory: Negative. Cardiovascular: Negative. Gastrointestinal:  Positive for abdominal pain. Musculoskeletal: Negative. Skin: Negative. Neurological: Negative. Psychiatric/Behavioral:  Negative for confusion. Past Medical History:   Diagnosis Date    Hyperlipidemia     Hypertension     PAD (peripheral artery disease) ([facility])     stenting in left leg        Past Surgical History:   Procedure Laterality Date    CHOLECYSTECTOMY      SHOULDER SURGERY Right     rotator cuff    TOE SURGERY Left        Allergies   Allergen Reactions    Morphine      Family reports hx of intolerance--patient himself has not had.  Family requests we add to profile        Social History     Socioeconomic History    Marital status: Single     Spouse name: Not on file    Number of children: Not on file    Years of education: Not on file    Highest education level: Not on file   Occupational History    Not on file   Tobacco Use    Smoking status: Every Day     Packs/day: 0.25     Years: 38.00     Pack years: 9.50     Types: Cigarettes     Start date: 1968    Smokeless tobacco: Never    Tobacco comments:     cutting down as high as 3 pk/d to as low . 25    Vaping Use    Vaping Use: Never used   Substance and Sexual Activity    Alcohol use: Yes     Comment: rarely    Drug use: Never    Sexual activity: Not on file   Other Topics Concern    Not on file   Social History Narrative    Not on file     Social Determinants of Health     Financial Resource Strain: Low Risk     Difficulty of Paying Living Expenses: Not hard at all   Food Insecurity: No Food Insecurity    Worried About [address] in the Last Year: Never true    [address] in the Last Year: Never true   Transportation Needs: No Transportation Needs    Lack of Transportation (Medical): No    Lack of Transportation (Non-Medical):  No   Physical Activity: Inactive    Days of Exercise per Week: 7 days    Minutes of Exercise per Session: 0 min   Stress: Not on file   Social Connections: Not on file   Intimate Partner Violence: Not on file   Housing Stability: Not on file       Family History   Problem Relation Age of Onset    Cancer Mother         uterine cancer     Other Father         MVA    Cancer Sister         breast    Cancer Brother         brain     Cancer Maternal Aunt         breast    No Known Problems Maternal Grandmother     No Known Problems Maternal Grandfather     No Known Problems Paternal Grandmother     No Known Problems Paternal Grandfather        Vital Signs  Vitals:    01/04/23 0645 01/04/23 0700 01/04/23 0715 01/04/23 0745   BP: 110/82 117/67 105/70 (!) 115/93   Pulse: 58 60 54 54   Resp: 10 17 16 12   Temp:    98.1 °F (36.7 °C)   TempSrc:    Oral   SpO2: 97% 93% 90% 92%   Weight:       Height:           I/O:    Intake/Output Summary (Last 24 hours) at 1/4/2023 0814  Last data filed at 1/4/2023 0620  Gross per 24 hour   Intake 445.31 ml   Output 1100 ml   Net -654.69 ml Physical Exam:  General: no apparent distress, alert and oriented, afebrile  Psychiatric: mood and affect are within normal limits  Head/Eyes/Ears/Nose/Throat: normocephalic and atraumatic, face symmetric, normal hearing, nose - negative  Neck: normal appearance and no deformity, supple, trachea midline  Chest/Lungs: clear to auscultation bilaterally, non labored breathing no tachypnea, retractions or cyanosis  Cardiac: Heart regular rate and rhythm  Abdomen: soft, nondistended, and tenderness throughout    Extremities: normal strength, tone, and muscle mass, no deformities, no significant edema to BLE  Vascular: extremities warm and well perfused, no signs of cyanosis or ischemia, bilateral upper and lower extremity motorsensory intact, capillary refill < 3 seconds  Wounds: none    Labs  Lab Results   Component Value Date/Time    WBC 11.8 01/03/2023 08:15 PM    HGB 13.4 01/03/2023 08:15 PM    HCT 40.5 01/03/2023 08:15 PM    MCV 83.1 01/03/2023 08:15 PM     01/03/2023 08:15 PM     Lab Results   Component Value Date/Time     01/04/2023 03:09 AM    K 4.0 01/04/2023 03:09 AM     01/04/2023 03:09 AM    CO2 24 01/04/2023 03:09 AM    BUN 11 01/04/2023 03:09 AM    CREATININE 0.9 01/04/2023 03:09 AM      No results found for: GLU    Scheduled Meds:    sodium chloride flush  5-40 mL IntraVENous 2 times per day     Continuous Infusions:    sodium chloride      niCARdipine 10 mg/hr (01/04/23 0620)       Imaging:   CT Abdomen Pelvis with IV Contrast - 1/4/2023  Impression  Atherosclerotic abdominal aorta with a moderate 6.5 cm fusiform aneurysm of the infrarenal aorta. There is moderate mural thrombus and plaque surrounding the aneurysm with a patent central lumen. There is focal wall irregularity posteriorly and inferiorly along the aneurysm with a 1.7 cm hypodensity extending along the left psoas muscle which represent a small pseudoaneurysm with no contrast extravasation.   There is some mild stranding and edema along the wall of the aneurysm anteriorly which could represent aortitis or possible very early minimal leaking around the aneurysm but is otherwise of uncertain etiology. Recommend vascular surgery consultation. Mild chronic liver changes with fatty replacement throughout and a 1.8 cm subcapsular hyperdensity along the right lobe laterally which could represent geographic fatty replacement or possible early lesion in the liver. Recommend follow-up or MRI correlation. Moderate diverticulosis of the left colon with no pericolonic inflammation. Mild prostatic enlargement with diffuse mild bladder wall thickening which may just be due to poor distention but is indeterminate. Recommend follow-up urological follow-up. Surgical clips along the suprapubic region. Moderate dilatation of the visualized ascending aorta measuring up to 5 cm. Recommend follow-up. Assessment:  -Abdominal Pain  -AAA - 6.5 cm per CT  -Type and Screen completed in ED yesterday    Plan:   -BP Control with Cardene infusion - SBP < 140  -NPO  -Anticipate Surgical intervention today - planning for endovascular vs. Open repair in progress    All pertinent information and plan of care discussed with Dr. Dalia Zhang. All questions and concerns were addressed with the patient. I have discussed the above stated plan with the patient and the nurse. The patient verbalized understanding and agreed with the plan. Thank you for allowing to us to participate in the care of Juancarlos Zarco Sr      Electronically signed by LELE Dent CNP on 1/4/2023 at 8:14 AM    VASCULAR STAFF    Patient evaluated at the same time in conjunction with the ACP or resident of which I performed greater than 50% of the history, physical exam, and/or medical decision making:    Patient presents with ongoing back and abdominal pain for several days. Denies fever or chills. Denies recent illnesses.  No urinary or bladder symptoms. Does have poor dentition on exam.   CT scan was done by his PCP and demonstrates inflammatory large AAA with possible saccular component proximally. There is a tandem blood supply to his right kidney with once vessel coming off close to the aneurysm origin. SHAYLA is occluded it seems. Dissection of right common femoral versus plaque disruption. On exam patient is uncomfortable. He has some abdominal tenderness to palpation. Palpable femoral pulses. Afebrile, normotensive but on nipride to bring down his pressure. Plan: patient has large inflammatory aortic aneurysm, cannot rule out infection but he does not have any overt signs of possible infection triggers. The degree of inflammation is concerning. His anatomy is concerning for using EVAR because it will likely need to result in coverage of his inferior right renal artery. His left renal and more cephalad right renal are patent. However in the context of open repair this vessel might be sacrificed either way to obtain healthy segment of artery for suturing. Discussed all options for repair. Discussed with [facility] representatives and my partner Dr. Sonja Monae. Will attempt endovascular repair of the AAA first with likely right focal common femoral open endarterectomy. Will also plan to isolate and coil the right distal accessory renal to limit leak potential. Patient understands that if this fails either acutely today or later then open conversion will be mandatory which carries higher risks. All risks including pain, infection, bleeding, embolization, thrombosis, renal failure, respiratory failure, myocardial infarction, stroke, death were clearly reviewed. Discussed all these risks and the conduct of the procedure including especially the need for cutdown and renal artery exclusion very clearly with the patient and his mother and son who were both present for this conversation. All agree to proceed. Check blood cultures before proceeding.        I have reviewed the history and physical and examined the patient and find no relevant changes. I have reviewed with the patient and/or family the risks, benefits, and alternatives to the procedure. I HAVE PRESENTED REASONABLE ALTERNATIVES TO THE PATIENT'S PROPOSED CARE, TREATMENT, AND SERVICES. THE DISCUSSION I HAVE DONE ENCOMPASSED RISKS, BENEFITS, AND SIDE EFFECTS RELATED TO THE ALTERNATIVES AND THE RISKS RELATED TO NOT RECEIVING THE PROPOSED CARE, TREATMENT, SERVICES. Patient:  Josefa Maloney Sr   :   1950    Intended Procedure: EVAR possible open AAA repair    Vitals:    23 1042   BP:    Pulse: 55   Resp: 24   Temp:    SpO2: 97%       Nursing notes reviewed and agreed. Medications reviewed  Allergies: Allergies   Allergen Reactions    Morphine      Family reports hx of intolerance--patient himself has not had. Family requests we add to profile. Patient has received medication-no complications noted.           Post Procedure plan: CVU    Antolin Score, DO, FACS, FSVS, [address] [facility] Vascular and Endovascular Surgery

## 2023-01-04 NOTE — ANESTHESIA PRE PROCEDURE
[facility] Department of Anesthesiology  Pre-Anesthesia Evaluation/Consultation       Name:  Herb Fothergill Sr  : 1950  Age:  81 y.o. MRN:  [id number]  Date: 2023           Surgeon: Surgeon(s):  Sherrell Cortez DO    Procedure: Procedure(s):  ENDOVASCULAR ABDOMINAL AORTIC ANEURYSM REPAIR  RIGHT FEMORAL ENDARTERECTOMY WITH PATCH     Allergies   Allergen Reactions    Morphine      Family reports hx of intolerance--patient himself has not had. Family requests we add to profile. Patient has received medication-no complications noted. Patient Active Problem List   Diagnosis    Benign prostatic hyperplasia (BPH) with urinary urgency    Essential hypertension    Hypercholesteremia    Smoker    Abdominal aortic aneurysm (AAA) greater than 5.0 cm in diameter in female     Past Medical History:   Diagnosis Date    Hyperlipidemia     Hypertension     PAD (peripheral artery disease) (HCC)     stenting in left leg      Past Surgical History:   Procedure Laterality Date    CHOLECYSTECTOMY      SHOULDER SURGERY Right     rotator cuff    TOE SURGERY Left      Social History     Tobacco Use    Smoking status: Every Day     Packs/day: 0.25     Years: 38.00     Pack years: 9.50     Types: Cigarettes     Start date:     Smokeless tobacco: Never    Tobacco comments:     cutting down as high as 3 pk/d to as low . 22    Vaping Use    Vaping Use: Never used   Substance Use Topics    Alcohol use: Yes     Comment: rarely    Drug use: Never     Medications  No current facility-administered medications on file prior to encounter.      Current Outpatient Medications on File Prior to Encounter   Medication Sig Dispense Refill    predniSONE (DELTASONE) 20 MG tablet Take 1 tablet by mouth daily for 5 days 5 tablet 0    tiZANidine (ZANAFLEX) 2 MG tablet Take 1 tablet by mouth 3 times daily as needed (low back pain) 30 tablet 1    atorvastatin (LIPITOR) 20 MG tablet TAKE 1 TABLET BY MOUTH DAILY 90 tablet 1    tamsulosin (FLOMAX) 0.4 MG capsule TAKE 1 CAPSULE BY MOUTH DAILY 90 capsule 1    albuterol sulfate HFA (VENTOLIN HFA) 108 (90 Base) MCG/ACT inhaler Inhale 2 puffs into the lungs 4 times daily as needed for Wheezing 18 g 5    lisinopril (PRINIVIL;ZESTRIL) 20 MG tablet TAKE 1 TABLET EVERY DAY 90 tablet 3    vitamin B-12 (CYANOCOBALAMIN) 500 MCG tablet Take 1 tablet by mouth daily 30 tablet 3     Current Facility-Administered Medications   Medication Dose Route Frequency Provider Last Rate Last Admin    albuterol sulfate HFA (PROVENTIL;VENTOLIN;PROAIR) 108 (90 Base) MCG/ACT inhaler 2 puff  2 puff Inhalation 4x Daily PRN Portia Tamez MD        sodium chloride flush 0.9 % injection 5-40 mL  5-40 mL IntraVENous 2 times per day Portia Tamez MD   10 mL at 01/04/23 0850    sodium chloride flush 0.9 % injection 5-40 mL  5-40 mL IntraVENous PRN Portia Tamez MD        0.9 % sodium chloride infusion   IntraVENous PRN Portia Tamez MD        ondansetron (ZOFRAN-ODT) disintegrating tablet 4 mg  4 mg Oral Q8H PRN Portia Tamez MD        Or    ondansetron [facility]) injection 4 mg  4 mg IntraVENous Q6H PRN Portia Tamez MD        polyethylene glycol (GLYCOLAX) packet 17 g  17 g Oral Daily PRN Portia Tamez MD        acetaminophen (TYLENOL) tablet 650 mg  650 mg Oral Q6H PRN MD Shahram Rowland acetaminophen (TYLENOL) suppository 650 mg  650 mg Rectal Q6H PRN Portia Tamez MD        perflutren lipid microspheres (DEFINITY) injection 1.5 mL  1.5 mL IntraVENous ONCE PRN Portia Tamez MD        niCARdipine (CARDENE) 25 mg in sodium chloride 0.9 % 250 mL infusion  3-15 mg/hr IntraVENous Continuous Portia Tamez MD 75 mL/hr at 01/04/23 0822 7.5 mg/hr at 01/04/23 1782    labetalol (NORMODYNE;TRANDATE) injection 20 mg  20 mg IntraVENous Q10 Min PRN Portia Tamez MD        morphine (PF) injection 2 mg  2 mg IntraVENous Q2H PRN Portia Tamez MD   2 mg at 01/04/23 2044    Or    morphine (PF) injection 4 mg  4 mg IntraVENous Q2H PRN Urbano Rivera MD   4 mg at 23 0850     Vital Signs (Current)   Vitals:    23 0645 23 0700 23 0715 23 0745   BP: 110/82 117/67 105/70 (!) 115/93   Pulse: 58 60 54 54   Resp: 10 17 16 12   Temp:    98.1 °F (36.7 °C)   TempSrc:    Oral   SpO2: 97% 93% 90% 92%   Weight:       Height:                                                Vital Signs Statistics (for past 48 hrs)     Temp  Av.1 °F (36.7 °C)  Min: 97.5 °F (36.4 °C)   Min taken time: 23  Max: 98.4 °F (36.9 °C)   Max taken time: 23  Pulse  Av  Min: 47   Min taken time: 23 0745  Max: 76   Max taken time: 23 0345  Resp  Avg: 15.1  Min: 10   Min taken time: 23 0645  Max: 23   Max taken time: 23 221  BP  Min: 100/74   Min taken time: 23 0600  Max: 179/90   Max taken time: 23 221  MAP (mmHg)  Av.1  Min: [de-identified]   Min taken time: 23 0500  Max: 112   Max taken time: 23 2215  SpO2  Av.4 %  Min: 90 %   Min taken time: 23 0715  Max: 100 %   Max taken time: 23  BP Readings from Last 3 Encounters:   23 (!) 115/93   23 118/76   22 119/83       BMI  Body mass index is 25.33 kg/m². Estimated body mass index is 25.33 kg/m² as calculated from the following:    Height as of this encounter: 6' 2\" (1.88 m). Weight as of this encounter: 197 lb 5 oz (89.5 kg).     CBC   Lab Results   Component Value Date/Time    WBC 11.8 2023 08:15 PM    RBC 4.88 2023 08:15 PM    HGB 13.4 2023 08:15 PM    HCT 40.5 2023 08:15 PM    MCV 83.1 2023 08:15 PM    RDW 15.9 2023 08:15 PM     2023 08:15 PM     CMP    Lab Results   Component Value Date/Time     2023 03:09 AM    K 4.0 2023 03:09 AM     2023 03:09 AM    CO2 24 2023 03:09 AM    BUN 11 2023 03:09 AM    CREATININE 0.9 2023 03:09 AM    GFRAA >60 2022 08:55 AM AGRATIO 1.4 01/04/2023 03:09 AM    LABGLOM >60 01/04/2023 03:09 AM    GLUCOSE 78 01/04/2023 03:09 AM    PROT 6.2 01/04/2023 03:09 AM    CALCIUM 9.0 01/04/2023 03:09 AM    BILITOT 0.3 01/04/2023 03:09 AM    ALKPHOS 89 01/04/2023 03:09 AM    AST 11 01/04/2023 03:09 AM    ALT <5 01/04/2023 03:09 AM     BMP    Lab Results   Component Value Date/Time     01/04/2023 03:09 AM    K 4.0 01/04/2023 03:09 AM     01/04/2023 03:09 AM    CO2 24 01/04/2023 03:09 AM    BUN 11 01/04/2023 03:09 AM    CREATININE 0.9 01/04/2023 03:09 AM    CALCIUM 9.0 01/04/2023 03:09 AM    GFRAA >60 06/14/2022 08:55 AM    LABGLOM >60 01/04/2023 03:09 AM    GLUCOSE 78 01/04/2023 03:09 AM     POCGlucose  Recent Labs     01/03/23  1653 01/03/23  2015 01/04/23  0309   GLUCOSE 88 113* 78      Coags    Lab Results   Component Value Date/Time    PROTIME 13.3 01/03/2023 08:15 PM    INR 1.02 01/03/2023 08:15 PM     HCG (If Applicable) No results found for: PREGTESTUR, PREGSERUM, HCG, HCGQUANT   ABGs No results found for: PHART, PO2ART, PFU1GSZ, WLO0PTF, BEART, V4RXQOIL   Type & Screen (If Applicable)  No results found for: LABABO, LABRH                         BMI: Wt Readings from Last 3 Encounters:       NPO Status:                          Anesthesia Evaluation  Patient summary reviewed no history of anesthetic complications:   Airway: Mallampati: II  TM distance: >3 FB   Neck ROM: full  Mouth opening: > = 3 FB   Dental: normal exam         Pulmonary: breath sounds clear to auscultation  (+) COPD:  current smoker    (-) asthma                           Cardiovascular:    (+) hypertension:, hyperlipidemia    (-) past MI, CABG/stent and  angina        Rate: normal                    Neuro/Psych:      (-) seizures, TIA and CVA           GI/Hepatic/Renal:        (-) GERD, liver disease and no renal disease       Endo/Other:        (-) diabetes mellitus, hypothyroidism               Abdominal:             Vascular:   + PVD, aortic or cerebral, .        ROS comment: Atherosclerotic abdominal aorta with a moderate 6.5 cm fusiform aneurysm of  the infrarenal aorta.  There is moderate mural thrombus and plaque  surrounding the aneurysm with a patent central lumen.  There is focal wall  irregularity posteriorly and inferiorly along the aneurysm with a 1.7 cm  hypodensity extending along the left psoas muscle which represent a small  pseudoaneurysm with no contrast extravasation.  There is some mild stranding  and edema along the wall of the aneurysm anteriorly which could represent  aortitis or possible very early minimal leaking around the aneurysm but is  otherwise of uncertain etiology. . Other Findings:           Anesthesia Plan      general     ASA 3       Induction: intravenous. MIPS: Postoperative opioids intended and Prophylactic antiemetics administered. Anesthetic plan and risks discussed with patient. Plan discussed with CRNA. This pre-anesthesia assessment may be used as a history and physical.    DOS STAFF ADDENDUM:    Pt seen and examined, chart reviewed (including anesthesia, drug and allergy history). No interval changes to history and physical examination. Anesthetic plan, risks, benefits, alternatives, and personnel involved discussed with patient. Patient verbalized an understanding and agrees to proceed.       Sancho Cheng MD  January 4, 2023  9:24 AM

## 2023-01-04 NOTE — PROGRESS NOTES
Patient admitted to Room 1301 from ED via stretcher. Patient A&Ox4. VSS. Patient complains of left abd pain radiating to back. Rates pain a 7/10. Patient placed on monitors. Patient oriented to room and call light. Patient verbalizes understanding. Will review orders for pain management.

## 2023-01-04 NOTE — ANESTHESIA PROCEDURE NOTES
Arterial Line:    An arterial line was placed using ultrasound guidance, in the ICU for the following indication(s): continuous blood pressure monitoring and blood sampling needed. A 20 gauge (size), 1 and 3/4 inch (length), Arrow (type) catheter was placed, into the right radial artery and Lido 1%, secured by Tegaderm. Anesthesia type: Local  Local infiltration: Injection    Events:  patient tolerated procedure well with no complications and EBL < 5mL.   Anesthesiologist: Norm Lawson MD  Performed: Anesthesiologist   Preanesthetic Checklist  Completed: patient identified, IV checked, site marked, risks and benefits discussed, surgical/procedural consents, equipment checked, pre-op evaluation, timeout performed, anesthesia consent given, oxygen available, monitors applied/VS acknowledged, fire risk safety assessment completed and verbalized and blood product R/B/A discussed and consented

## 2023-01-04 NOTE — PLAN OF CARE
Problem: Pain  Goal: Verbalizes/displays adequate comfort level or baseline comfort level  Outcome: Progressing  Flowsheets (Taken 1/4/2023 5969)  Verbalizes/displays adequate comfort level or baseline comfort level:   Encourage patient to monitor pain and request assistance   Assess pain using appropriate pain scale   Administer analgesics based on type and severity of pain and evaluate response   Implement non-pharmacological measures as appropriate and evaluate response   Consider cultural and social influences on pain and pain management   Notify Licensed Independent Practitioner if interventions unsuccessful or patient reports new pain     Problem: ABCDS Injury Assessment  Goal: Absence of physical injury  Outcome: Progressing

## 2023-01-04 NOTE — PROGRESS NOTES
Pharmacy Medication Reconciliation Note     List of medications Leanna Cruz is currently taking is complete. Source of information:   1. Conversation with patient and family at bedside and by phone  2. EMR    Notes regarding home medications:   1. Patient reports taking all AM meds PTA in the ED--has not started new medications called in today  2. Sister on phone reports she has severe intolerance to morphine and requested I add this intolerance to patients profile and we do not give.  Reports \"shutting her system down\" as the intolerance     Patient denies taking any additional OTC or herbal medications    Ceci Miller, Pharmacy Intern  1/3/2023  9:55 PM

## 2023-01-04 NOTE — PROGRESS NOTES
Hospitalist Progress Note      PCP: Bobo Mcgovern MD    Date of Admission: 1/3/2023    Chief Complaint: back pain    Hospital Course:      Subjective: seen this morning on rounds before surgery . Back pain controlled, no new symptoms      Medications:  Reviewed    Infusion Medications    sodium chloride      niCARdipine 10 mg/hr (01/04/23 0620)     Scheduled Medications    sodium chloride flush  5-40 mL IntraVENous 2 times per day     PRN Meds: albuterol sulfate HFA, sodium chloride flush, sodium chloride, ondansetron **OR** ondansetron, polyethylene glycol, acetaminophen **OR** acetaminophen, perflutren lipid microspheres, labetalol, morphine **OR** morphine      Intake/Output Summary (Last 24 hours) at 1/4/2023 0814  Last data filed at 1/4/2023 3663  Gross per 24 hour   Intake 445.31 ml   Output 1100 ml   Net -654.69 ml       Exam:    BP (!) 115/93   Pulse 54   Temp 98.1 °F (36.7 °C) (Oral)   Resp 12   Ht 6' 2\" (1.88 m)   Wt 197 lb 5 oz (89.5 kg)   SpO2 92%   BMI 25.33 kg/m²     General appearance: No apparent distress, appears stated age and cooperative. HEENT: Pupils equal, round, and reactive to light. Conjunctivae/corneas clear. Neck: Supple, with full range of motion. No jugular venous distention. Trachea midline. Respiratory:  Normal respiratory effort. Clear to auscultation, bilaterally without Rales/Wheezes/Rhonchi. Cardiovascular: Regular rate and rhythm with normal S1/S2 without murmurs, rubs or gallops. Abdomen: Soft, non-tender, non-distended with normal bowel sounds. Musculoskeletal: No clubbing, cyanosis or edema bilaterally. Full range of motion without deformity. Skin: Skin color, texture, turgor normal.  No rashes or lesions. Neurologic:  Neurovascularly intact without any focal sensory/motor deficits.  Cranial nerves: II-XII intact, grossly non-focal.  Psychiatric: Alert and oriented, thought content appropriate, normal insight  Capillary Refill: Brisk,< 3 seconds Peripheral Pulses: +2 palpable, equal bilaterally       Labs:   Recent Labs     01/03/23 1653 01/03/23 2015   WBC 11.1* 11.8*   HGB 13.6 13.4*   HCT 40.4* 40.5    242     Recent Labs     01/03/23 1653 01/03/23 2015 01/04/23  0309    135* 138   K 4.8 3.6 4.0    100 104   CO2 25 25 24   BUN 13 11 11   CREATININE 0.9  1.0 1.1 0.9   CALCIUM 9.4 9.7 9.0     Recent Labs     01/03/23 1653 01/03/23 2015 01/04/23  0309   AST 21 13* 11*   ALT <5* <5* <5*   BILITOT 0.5 0.5 0.3   ALKPHOS 99 97 89     Recent Labs     01/03/23 2015   INR 1.02     No results for input(s): [facility] in the last 72 hours. Urinalysis:      Lab Results   Component Value Date/Time    BLOODU MODERATE 08/08/2019 09:04 AM    SPECGRAV 1.025 08/08/2019 09:04 AM    GLUCOSEU NEG 08/08/2019 09:04 AM       Radiology:  No orders to display           Assessment/Plan:    Active Hospital Problems    Diagnosis Date Noted    Abdominal aortic aneurysm (AAA) greater than 5.0 cm in diameter in female [I71.40] 01/03/2023     Priority: Medium     Aneurysm of abdominal aorta with inflammation  Mural thrombus  - nicardipine drip to keep SBP < 160  - s/p endovascular repair 1/4  - ASA 81mg daily  - blood cultures ordered.   Cefazolin per vascular surgery  - ID consulted to r/o infectious cause    Liver lesion of uncertain significance:  - liver MRI ordered    Prostate enlargement with bladder wall thickening  - urology consulted    DVT Prophylaxis: lovenox  Diet: Diet NPO  Code Status: Full Code    PT/OT Eval Status: pending    Andria Solis MD

## 2023-01-04 NOTE — PROGRESS NOTES
Surgical and Anesthesia consent obtained. This RN witnessed both. Surgical team at bedside to transport patient to [facility]. Patient alert and oriented x4. SB on monitor with 1st degree AV block. SBP< 140 mmHg. Patient left CVU in stable condition. Patient will return to 1301 after procedure is completed.     Electronically signed by Amisha Fox RN on 1/4/2023 at 11:42 AM

## 2023-01-04 NOTE — PROGRESS NOTES
Patient arrived from [facility] OR to CVU, 1301. SR on monitor. On 3 LPM nasal cannula. Patient lethargic and groggy from surgery. PIV x2, right radial arterial line and vega intact and in place. Dr. Yenni Ramires gave bedside report to this RN along with anesthesia. All questions answered and concerns addressed. Recovery period begins 1530, vital signs q15 min x4, q30 min x2, q1 hour x1 then per unit routine. Bedrest for 8 hours. Able to elevate HOB at least 30 degrees and patient is able to eat and drink when ready.     Electronically signed by Leela Dawkins RN on 1/4/2023 at 3:41 PM

## 2023-01-04 NOTE — BRIEF OP NOTE
Brief Postoperative Note      Patient: Haile Marcial Sr  YOB: 1950  MRN: [id number]    Date of Procedure: 1/4/2023    Pre-Op Diagnosis: ANEURYSM OF INFRARENAL ABDOMINAL AORTA    Post-Op Diagnosis: Same       Procedure(s):  ENDOVASCULAR ABDOMINAL AORTIC ANEURYSM REPAIR  RIGHT FEMORAL ENDARTERECTOMY WITH PATCH; COIL EMBOLIZATION OF RIGHT DISTAL ACCESSORY RENAL ARTERY    Surgeon(s):  Darrius Apodaca DO    Assistant:  Surgical Assistant: Po Silverio    Anesthesia: General    Estimated Blood Loss (mL): less than 201     Complications: None    Specimens:   * No specimens in log *    Implants:  Implant Name Type Inv.  Item Serial No.  Lot No. LRB No. Used Action   IMPLANT BIO TISS [id number] BOV PERICARD BIOCOMPATIBLE STR - [id number]  IMPLANT BIO TISS [id number] BOV PERICARD BIOCOMPATIBLE STR  [employer]- [id number] Right 1 Implanted   COIL EMB L17CM LOOP DIA6MM 0.035IN [employer] - [id number] Vascular coils COIL EMB L17CM LOOP DIA6MM 0.035IN [employer]  [employer]- [id number] Right 1 Implanted   COIL EMB L7CM LOOP DIA4MM 0.035IN [employer] [facility] - [id number] Vascular coils COIL EMB L7CM LOOP DIA4MM 0.035IN [employer] [facility]  [employer] [facility] [id number] Right 1 Implanted   GRAFT STENT TRUNK IPSILATERAL LEG 36X14.5 MMX14 CM EXCLUDER - [id number] Vascular grafts GRAFT STENT TRUNK IPSILATERAL LEG 36X14.5 MMX14 CM EXCLUDER [id number]  [employer] [facility]  Right 1 Implanted   GRAFT EVAR L14CM [id number] CONTRALATERAL LEG FOR AAA EXCLUDER - [id number] Endografts GRAFT EVAR L14CM [id number] CONTRALATERAL LEG FOR AAA EXCLUDER [id number]  [employer] [facility]  Right 1 Implanted   GRAFT EVAR L10CM DST [id number] FEP NIT CONTRALATERAL LEG [place] - [id number] Endografts GRAFT EVAR L10CM DST [id number] FEP NIT CONTRALATERAL LEG [place] [id number]  [employer] [facility]  Right 1 Implanted         Drains:   Urinary Catheter 01/04/23 [place] (Active)       [REMOVED] External Urinary Catheter (Removed)   Site Assessment Clean,dry & intact 01/04/23 0800   Placement Initiated 01/03/23 2230   Catheter Care Catheter/Wick replaced;Suction Canister/Tubing changed 01/03/23 2230   Perineal Care Yes 01/04/23 0800   Suction 40 mmgHg continuous 01/04/23 0800   Urine Color Yellow 01/04/23 0800   Urine Appearance Clear 01/04/23 0800   Output (mL) 300 mL 01/04/23 0845       Findings: appears sealed on final angiogram    Electronically signed by Darrius Apodaca DO on 1/4/2023 at 2:57 PM

## 2023-01-05 VITALS
HEIGHT: 74 IN | RESPIRATION RATE: 15 BRPM | SYSTOLIC BLOOD PRESSURE: 126 MMHG | HEART RATE: 66 BPM | OXYGEN SATURATION: 95 % | DIASTOLIC BLOOD PRESSURE: 79 MMHG | WEIGHT: 202.38 LBS | BODY MASS INDEX: 25.97 KG/M2 | TEMPERATURE: 98.1 F

## 2023-01-05 LAB
A/G RATIO: 1.5 (ref 1.1–2.2)
ALBUMIN SERPL-MCNC: 3.4 G/DL (ref 3.4–5)
ALP BLD-CCNC: 77 U/L (ref 40–129)
ALT SERPL-CCNC: 7 U/L (ref 10–40)
ANION GAP SERPL CALCULATED.3IONS-SCNC: 12 MMOL/L (ref 3–16)
AST SERPL-CCNC: 15 U/L (ref 15–37)
BILIRUB SERPL-MCNC: 0.3 MG/DL (ref 0–1)
BUN BLDV-MCNC: 14 MG/DL (ref 7–20)
C-REACTIVE PROTEIN: 11.9 MG/L (ref 0–5.1)
CALCIUM SERPL-MCNC: 8.1 MG/DL (ref 8.3–10.6)
CHLORIDE BLD-SCNC: 108 MMOL/L (ref 99–110)
CO2: 17 MMOL/L (ref 21–32)
CREAT SERPL-MCNC: 1 MG/DL (ref 0.8–1.3)
GFR SERPL CREATININE-BSD FRML MDRD: >60 ML/MIN/{1.73_M2}
GLUCOSE BLD-MCNC: 135 MG/DL (ref 70–99)
HCT VFR BLD CALC: 33.5 % (ref 40.5–52.5)
HEMOGLOBIN: 11.2 G/DL (ref 13.5–17.5)
MCH RBC QN AUTO: 27.4 PG (ref 26–34)
MCHC RBC AUTO-ENTMCNC: 33.5 G/DL (ref 31–36)
MCV RBC AUTO: 81.8 FL (ref 80–100)
PDW BLD-RTO: 16.2 % (ref 12.4–15.4)
PLATELET # BLD: 204 K/UL (ref 135–450)
PMV BLD AUTO: 8.8 FL (ref 5–10.5)
POTASSIUM REFLEX MAGNESIUM: 4.2 MMOL/L (ref 3.5–5.1)
PROCALCITONIN: 0.06 NG/ML (ref 0–0.15)
RBC # BLD: 4.09 M/UL (ref 4.2–5.9)
SODIUM BLD-SCNC: 137 MMOL/L (ref 136–145)
TOTAL PROTEIN: 5.6 G/DL (ref 6.4–8.2)
WBC # BLD: 13.2 K/UL (ref 4–11)

## 2023-01-05 PROCEDURE — 6360000002 HC RX W HCPCS: Performed by: STUDENT IN AN ORGANIZED HEALTH CARE EDUCATION/TRAINING PROGRAM

## 2023-01-05 PROCEDURE — 2580000003 HC RX 258: Performed by: STUDENT IN AN ORGANIZED HEALTH CARE EDUCATION/TRAINING PROGRAM

## 2023-01-05 PROCEDURE — 80053 COMPREHEN METABOLIC PANEL: CPT

## 2023-01-05 PROCEDURE — 85027 COMPLETE CBC AUTOMATED: CPT

## 2023-01-05 PROCEDURE — 6370000000 HC RX 637 (ALT 250 FOR IP): Performed by: STUDENT IN AN ORGANIZED HEALTH CARE EDUCATION/TRAINING PROGRAM

## 2023-01-05 PROCEDURE — 6370000000 HC RX 637 (ALT 250 FOR IP): Performed by: INTERNAL MEDICINE

## 2023-01-05 PROCEDURE — 86140 C-REACTIVE PROTEIN: CPT

## 2023-01-05 PROCEDURE — 94761 N-INVAS EAR/PLS OXIMETRY MLT: CPT

## 2023-01-05 PROCEDURE — APPNB30 APP NON BILLABLE TIME 0-30 MINS: Performed by: NURSE PRACTITIONER

## 2023-01-05 PROCEDURE — 84145 PROCALCITONIN (PCT): CPT

## 2023-01-05 PROCEDURE — 6370000000 HC RX 637 (ALT 250 FOR IP): Performed by: NURSE PRACTITIONER

## 2023-01-05 RX ORDER — AMLODIPINE BESYLATE 5 MG/1
5 TABLET ORAL DAILY
Status: DISCONTINUED | OUTPATIENT
Start: 2023-01-05 | End: 2023-01-05 | Stop reason: HOSPADM

## 2023-01-05 RX ORDER — TAMSULOSIN HYDROCHLORIDE 0.4 MG/1
0.4 CAPSULE ORAL DAILY
Status: DISCONTINUED | OUTPATIENT
Start: 2023-01-05 | End: 2023-01-05 | Stop reason: HOSPADM

## 2023-01-05 RX ORDER — AMLODIPINE BESYLATE 5 MG/1
5 TABLET ORAL DAILY
Qty: 30 TABLET | Refills: 3 | Status: SHIPPED | OUTPATIENT
Start: 2023-01-06

## 2023-01-05 RX ORDER — HYDRALAZINE HYDROCHLORIDE 20 MG/ML
10 INJECTION INTRAMUSCULAR; INTRAVENOUS EVERY 4 HOURS PRN
Status: DISCONTINUED | OUTPATIENT
Start: 2023-01-05 | End: 2023-01-05 | Stop reason: HOSPADM

## 2023-01-05 RX ADMIN — CEFAZOLIN 2000 MG: 2 INJECTION, POWDER, FOR SOLUTION INTRAMUSCULAR; INTRAVENOUS at 12:15

## 2023-01-05 RX ADMIN — ASPIRIN 81 MG: 81 TABLET, COATED ORAL at 09:20

## 2023-01-05 RX ADMIN — SODIUM CHLORIDE 25 ML: 9 INJECTION, SOLUTION INTRAVENOUS at 12:14

## 2023-01-05 RX ADMIN — SODIUM CHLORIDE, PRESERVATIVE FREE 10 ML: 5 INJECTION INTRAVENOUS at 09:21

## 2023-01-05 RX ADMIN — SODIUM CHLORIDE: 9 INJECTION, SOLUTION INTRAVENOUS at 02:33

## 2023-01-05 RX ADMIN — AMLODIPINE BESYLATE 5 MG: 5 TABLET ORAL at 10:20

## 2023-01-05 RX ADMIN — ENOXAPARIN SODIUM 40 MG: 100 INJECTION SUBCUTANEOUS at 09:20

## 2023-01-05 RX ADMIN — TAMSULOSIN HYDROCHLORIDE 0.4 MG: 0.4 CAPSULE ORAL at 09:20

## 2023-01-05 RX ADMIN — CEFAZOLIN 2000 MG: 2 INJECTION, POWDER, FOR SOLUTION INTRAMUSCULAR; INTRAVENOUS at 03:50

## 2023-01-05 ASSESSMENT — PAIN SCALES - GENERAL
PAINLEVEL_OUTOF10: 4
PAINLEVEL_OUTOF10: 0

## 2023-01-05 NOTE — CONSULTS
Infectious Diseases Inpatient Consult Note      Reason for Consult: Aortic Aneurysm and Mural thrombus     Requesting Physician:   Abner Peralta     Primary Care Physician:  Bernard Christensen MD    History Obtained From:  Epic and Patient     CHIEF COMPLAINT:     Chief Complaint   Patient presents with    Abnormal CT     Patient told to come to ER following outpatient CT because of \"leaky\" aorta/aortic aneurysm. Per family, patient has been dealing with abdominal pain since December, he also endorses back pain in the same area as the abdominal pain. HISTORY OF PRESENT ILLNESS:  81 y.o. man with a history of hypertension, peripheral vascular disease, hyperlipidemia, admitted to hospital secondary to abdominal pain ongoing for 3 to 4 days. He was evaluated by primary MD underwent abdominal CT scan, which was abnormal indicating a abdominal aortic aneurysm up to 6.5 cm with fusiform in the infrarenal aorta associated with mural thrombus and plaque with patent centra lumen l also focal wall irregularity there is some mild stranding and edema along the wall of the aneurysm. Given these findings he was advised to come to the ED for further evaluation. He was seen by vascular surgery and was taken for abdominal aortic aneurysm endovascular repair, also required right femoral endarterectomy patch required embolization of the right distal accessory renal artery by Dr. Pearl Bowman on 1/4/23. We are consulted to check for any infectious aortitis given the presentation.        Past Medical History:    Past Medical History:   Diagnosis Date    Hyperlipidemia     Hypertension     PAD (peripheral artery disease) ([facility])     stenting in left leg        Past Surgical History:    Past Surgical History:   Procedure Laterality Date    CHOLECYSTECTOMY      SHOULDER SURGERY Right     rotator cuff    TOE SURGERY Left        Current Medications:    No outpatient medications have been marked as taking for the 1/3/23 encounter [facility] Encounter). Allergies:  Morphine    Immunizations :   Immunization History   Administered Date(s) Administered    COVID-19, PFIZER GRAY top, DO NOT Dilute, (age 15 y+), IM, 30 mcg/0.3 mL 02/08/2022    COVID-19, PFIZER PURPLE top, DILUTE for use, (age 15 y+), 30mcg/0.3mL 04/01/2021, 04/22/2021    Influenza Virus Vaccine 11/19/2013, 09/18/2014, 10/01/2015, 02/12/2018    Pneumococcal Conjugate 13-valent ([id number]) 02/12/2018    Pneumococcal Polysaccharide ([id number]) 04/06/2015    Tdap (Boostrix, Adacel) 09/01/2011         Social History:    Social History     Tobacco Use    Smoking status: Every Day     Packs/day: 0.25     Years: 38.00     Pack years: 9.50     Types: Cigarettes     Start date: 1968    Smokeless tobacco: Never    Tobacco comments:     cutting down as high as 3 pk/d to as low . 25    Vaping Use    Vaping Use: Never used   Substance Use Topics    Alcohol use: Yes     Comment: rarely    Drug use: Never     Social History     Tobacco Use   Smoking Status Every Day    Packs/day: 0.25    Years: 38.00    Pack years: 9.50    Types: Cigarettes    Start date: 1968   Smokeless Tobacco Never   Tobacco Comments    cutting down as high as 3 pk/d to as low . 22       Family History   Problem Relation Age of Onset    Cancer Mother         uterine cancer     Other Father         MVA    Cancer Sister         breast    Cancer Brother         brain     Cancer Maternal Aunt         breast    No Known Problems Maternal Grandmother     No Known Problems Maternal Grandfather     No Known Problems Paternal Grandmother     No Known Problems Paternal Grandfather          REVIEW OF SYSTEMS:      Constitutional:  negative for fevers, chills, night sweats  Eyes:  negative for blurred vision, eye discharge, visual disturbance   HEENT:  negative for hearing loss, ear drainage,nasal congestion  Respiratory:  negative for cough, shortness of breath or hemoptysis   Cardiovascular:  negative for chest pain, palpitations, syncope  Gastrointestinal:  negative for nausea, vomiting, diarrhea, constipation, abdominal pain++   Genitourinary:  negative for frequency, dysuria, urinary incontinence, hematuria  Hematologic/Lymphatic:  negative for easy bruising, bleeding and lymphadenopathy  Allergic/Immunologic:  negative for recurrent infections, angioedema, anaphylaxis   Endocrine:  negative for weight changes, polyuria, polydipsia and polyphagia  Musculoskeletal:  negative for joint  pain, swelling, decreased range of motion  Integumentary: No rashes, skin lesions  Neurological:  negative for headaches, slurred speech, unilateral weakness  Psychiatric: negative for hallucinations,confusion,agitation.      PHYSICAL EXAM:      Vitals:    BP (!) 161/91   Pulse 60   Temp 98.3 °F (36.8 °C) (Oral)   Resp 15   Ht 6' 2\" (1.88 m)   Wt 202 lb 6.1 oz (91.8 kg)   SpO2 97%   BMI 25.98 kg/m²     General Appearance: alert,in no acute distress, +  pallor, no icterus skin changes from smoking ++   Skin: warm and dry, no rash or erythema  Head: normocephalic and atraumatic  Eyes: pupils equal, round, and reactive to light, conjunctivae normal  ENT: tympanic membrane, external ear and ear canal normal bilaterally, nose without deformity, nasal mucosa and turbinates normal without polyps  Neck: supple and non-tender without mass, no thyromegaly  no cervical lymphadenopathy  Pulmonary/Chest: clear to auscultation bilaterally- no wheezes, rales or rhonchi, normal air movement, no respiratory distress  Cardiovascular: normal rate, regular rhythm, normal S1 and S2, no murmurs, rubs, clicks, or gallops, no carotid bruits  Abdomen: soft, non-tender, non-distended, normal bowel sounds, no masses or organomegaly  Extremities: no cyanosis, clubbing or edema  Musculoskeletal: normal range of motion, no joint swelling, deformity or tenderness  Integumentary: No rashes, no abnormal skin lesions, no petechiae  Neurologic: reflexes normal and symmetric, no cranial nerve deficit  Psych:  Orientation, sensorium, mood normal   Lines: IV  Rt groin incision clean    DATA:    CBC:   Lab Results   Component Value Date    WBC 13.2 (H) 01/05/2023    HGB 11.2 (L) 01/05/2023    HCT 33.5 (L) 01/05/2023    MCV 81.8 01/05/2023     01/05/2023     RENAL:   Lab Results   Component Value Date    CREATININE 1.0 01/05/2023    BUN 14 01/05/2023     01/05/2023    K 4.2 01/05/2023     01/05/2023    CO2 17 (L) 01/05/2023     SED RATE: No results found for: SEDRATE  CK: No results found for: CKTOTAL  CRP:   Lab Results   Component Value Date/Time    CRP 11.9 01/05/2023 04:03 AM     Hepatic Function Panel:   Lab Results   Component Value Date/Time    ALKPHOS 77 01/05/2023 04:03 AM    ALT 7 01/05/2023 04:03 AM    AST 15 01/05/2023 04:03 AM    PROT 5.6 01/05/2023 04:03 AM    BILITOT 0.3 01/05/2023 04:03 AM    LABALBU 3.4 01/05/2023 04:03 AM     UA:  Lab Results   Component Value Date/Time    COLORU ORANGE 08/08/2019 09:04 AM    CLARITYU CLEAR 08/08/2019 09:04 AM    GLUCOSEU NEG 08/08/2019 09:04 AM    BILIRUBINUR NEG 08/08/2019 09:04 AM    KETUA NEG 08/08/2019 09:04 AM    SPECGRAV 1.025 08/08/2019 09:04 AM    BLOODU MODERATE 08/08/2019 09:04 AM    PHUR 6.0 08/08/2019 09:04 AM    PROTEINU TRACE 08/08/2019 09:04 AM    LEUKOCYTESUR MODERATE 08/08/2019 09:04 AM      Urine Microscopic: No results found for: LABCAST, BACTERIA, COMU, HYALCAST, WBCUA, RBCUA, EPIU  Urine Reflex to Culture: No results found for: URRFLXCULT    CRP  11.9       MICRO: cultures reviewed and updated by me   Procedure Component Value Units Date/Time   Culture, Blood 2 [[id number]] Collected: 01/04/23 1130   Order Status: Sent Specimen: Blood Updated: 01/04/23 1135   Culture, Blood 1 [[id number]] Collected: 01/04/23 1124   Order Status: Sent Specimen: Blood Updated: 01/04/23 1135       Blood Culture: No results found for: BC, BLOODCULT2    Viral Culture:    No results found for: COVID19  Urine Culture: No results for input(s): LABURIN in the last 72 hours. Scheduled Meds:   tamsulosin  0.4 mg Oral Daily    amLODIPine  5 mg Oral Daily    enoxaparin  40 mg SubCUTAneous Daily    aspirin  81 mg Oral Daily    ceFAZolin  2,000 mg IntraVENous Q8H    sodium chloride flush  5-40 mL IntraVENous 2 times per day       Continuous Infusions:   sodium chloride      niCARdipine Stopped (01/04/23 1537)    sodium chloride         PRN Meds:  hydrALAZINE, sodium chloride, HYDROmorphone, albuterol sulfate HFA, sodium chloride flush, sodium chloride, ondansetron **OR** ondansetron, polyethylene glycol, acetaminophen **OR** acetaminophen, perflutren lipid microspheres, labetalol    Imaging:   No orders to display     CT abd/pelvis from 1/3/23   Impression   Atherosclerotic abdominal aorta with a moderate 6.5 cm fusiform aneurysm of   the infrarenal aorta. There is moderate mural thrombus and plaque   surrounding the aneurysm with a patent central lumen. There is focal wall   irregularity posteriorly and inferiorly along the aneurysm with a 1.7 cm   hypodensity extending along the left psoas muscle which represent a small   pseudoaneurysm with no contrast extravasation. There is some mild stranding   and edema along the wall of the aneurysm anteriorly which could represent   aortitis or possible very early minimal leaking around the aneurysm but is   otherwise of uncertain etiology. Recommend vascular surgery consultation. Mild chronic liver changes with fatty replacement throughout and a 1.8 cm   subcapsular hyperdensity along the right lobe laterally which could represent   geographic fatty replacement or possible early lesion in the liver. Recommend follow-up or MRI correlation. Moderate diverticulosis of the left colon with no pericolonic inflammation. Mild prostatic enlargement with diffuse mild bladder wall thickening which   may just be due to poor distention but is indeterminate.   Recommend follow-up   urological follow-up. Surgical clips along the suprapubic region. Moderate dilatation of the visualized ascending aorta measuring up to 5 cm. Recommend follow-up. The findings were called to Dr. Anisha Elizabeth at 6:30 p. All pertinent images and reports for the current Hospitalization were reviewed by me. IMPRESSION:    Patient Active Problem List   Diagnosis    Benign prostatic hyperplasia (BPH) with urinary urgency    Essential hypertension    Hypercholesteremia    Smoker    Abdominal aortic aneurysm (AAA) greater than 5.0 cm in diameter in female    Inflammatory abdominal aortic aneurysm     Admitted with Abdominal pain +  AAA fusiform  6.5 cm infra renal aorta  Moderate Mural thrombus+  Possible small Pseudoaneurysm  S/p  ENDOVASCULAR ABDOMINAL AORTIC ANEURYSM REPAIR RIGHT FEMORAL ENDARTERECTOMY WITH PATCH; COIL EMBOLIZATION OF RIGHT DISTAL ACCESSORY RENAL ARTERY  on  1/4/23  Smoking+  PVD history  Blood cx -ve      Aortic aneurysm and changes are from atherosclerosis and smoking related changes and he has h/o PVD with stenting in the past and no concern for infectious process or Aortitis     Labs, Microbiology, Radiology and pertinent results from current hospitalization and care every where were reviewed by me as a part of the consultation. PLAN :  No antibiotics indicated  Quit smoking d/w pt  Follow up per Vascular surgery   CRP not much elevated  Blood cx  -ve    Will sign off       Discussed with patient/Family and Nursing d/w      Thanks for allowing me to participate in your patient's care please call me with any questions or concerns.     Dr. Candelaria Decker MD  [facility]  Phone: [phone]   Fax : [phone]

## 2023-01-05 NOTE — CARE COORDINATION
Chart Reviewed. Met with patient and multiple family in room to assess dc planning needs and to address ACP. INITIAL CASE MANAGEMENT ASSESSMENT    Reviewed chart, met with patient to assess possible discharge needs. Explained Case Management role/services. Living Situation: Pt and son live together in a house with 4 steps entry and one floor. He reports to being totally independent with all ADLs and driving. ADLs: totally indpeendnet     DME: none    PT/OT Recs: TBD     Active Services: none     Transportation: family at bedside     [facility] [place] [place]    PCP: Dr Yancey Certain      HD/PD: none    PLAN/COMMENTS: Goal is home, no needs.     [place], [place]     Case Management   [phone]    1/5/2023  3:01 PM

## 2023-01-05 NOTE — PROGRESS NOTES
Discharge instructions reviewed with patient and family member. Patient and family verbalized understanding. All home medications have been reviewed, questions answered and patient voiced understanding. All medication side effects reviewed and patient and family verbalized understanding. Patient given prescriptions, discharge instructions, and appointment times. Patient discharged to home with family via private car. Taken to lobby via wheelchair.    Electronically signed by Cecil Fernández RN on 1/5/2023 at 4:20 PM

## 2023-01-05 NOTE — PLAN OF CARE
Problem: Pain  Goal: Verbalizes/displays adequate comfort level or baseline comfort level  Outcome: Progressing     Problem: ABCDS Injury Assessment  Goal: Absence of physical injury  Outcome: Progressing     Problem: Discharge Planning  Goal: Discharge to home or other facility with appropriate resources  Outcome: Progressing

## 2023-01-05 NOTE — PROGRESS NOTES
4 Eyes Skin Assessment     NAME:  Winifred Live Sr  YOB: 1950  MEDICAL RECORD NUMBER:  [id number]    The patient is being assessed for  Shift Handoff    I agree that One RN have performed a thorough Head to Toe Skin Assessment on the patient. ALL assessment sites listed below have been assessed. Areas assessed by both nurses:    Head, Face, Ears, Shoulders, Back, Chest, Arms, Elbows, Hands, Sacrum. Buttock, Coccyx, Ischium, and Legs. Feet and Heels        Does the Patient have a Wound?  No noted wound(s)       Ashok Prevention initiated by RN: Yes   Wound Care Orders initiated by RN: No    Pressure Injury (Stage 3,4, Unstageable, DTI, NWPT, and Complex wounds) if present place referral order by RN under : NA    New and Established Ostomies, if present place, referral order under : NA      Nurse 1 eSignature: Electronically signed by Butch Araiza RN on 1/4/23 at 7:08 PM EST    **SHARE this note so that the co-signing nurse is able to place an eSignature**    Nurse 2 eSignature: Electronically signed by Tawana Wagner RN on 1/5/23 at 4:43 AM EST

## 2023-01-05 NOTE — PROGRESS NOTES
[facility]  Progress Note    1/5/2023 8:30 AM    Chief Complaint: Abdominal Pain on Admission     Reason for Consult: AAA    POD #1 Endovascular Abdominal Aortic Aneurysm Repair, Right Femoral Endarterectomy with Patch and Coil Embolization of Right Distal Accessory Renal Artery with Dr. Tobi Linton    Subjective: Pt seen resting in bed, he tells me his abdominal pain is tremendously improved from yesterday, he does report mild pain to his Right Groin, overall doing well    Vital Signs:  Vitals:    01/05/23 0400 01/05/23 0500 01/05/23 0615 01/05/23 0700   BP:       Pulse: 75 66  64   Resp: 18 11  11   Temp: 98 °F (36.7 °C)      TempSrc: Oral      SpO2: 97% 96%  94%   Weight:   202 lb 6.1 oz (91.8 kg)    Height:           I/O:    Intake/Output Summary (Last 24 hours) at 1/5/2023 0830  Last data filed at 1/5/2023 0615  Gross per 24 hour   Intake 2338.1 ml   Output 1680 ml   Net 658.1 ml       Physical Exam:   General: no apparent distress, alert and oriented, afebrile  Chest/Lungs: non labored breathing no tachypnea, retractions or cyanosis  Cardiac: Heart regular rate and rhythm  Abdomen: soft, nondistended, and nontender  Extremities: normal strength, tone, and muscle mass, no deformities, no significant edema to BLE,   -RLE - Right Groin incision soft and intact, mild tenderness with palpation, no signs of hematoma or significant swelling  -LLE - Left Groin Puncture site intact, soft, non-tender, no signs of hematoma or swelling   Vascular: extremities warm and well perfused, no signs of cyanosis or ischemia, bilateral upper and lower extremity motorsensory intact    Pulses:  -R DP: doppler signal present  -L DP: doppler signal present  -R PT: doppler signal present  -L PT: doppler signal present    Labs:   Lab Results   Component Value Date/Time     01/05/2023 04:03 AM    K 4.2 01/05/2023 04:03 AM     01/05/2023 04:03 AM    CO2 17 01/05/2023 04:03 AM    BUN 14 01/05/2023 04:03 AM    CREATININE 1.0 01/05/2023 04:03 AM    GFRAA >60 06/14/2022 08:55 AM    LABGLOM >60 01/05/2023 04:03 AM    GLUCOSE 135 01/05/2023 04:03 AM    CALCIUM 8.1 01/05/2023 04:03 AM     Lab Results   Component Value Date/Time    WBC 13.2 01/05/2023 04:03 AM    RBC 4.09 01/05/2023 04:03 AM    HGB 11.2 01/05/2023 04:03 AM    HCT 33.5 01/05/2023 04:03 AM    MCV 81.8 01/05/2023 04:03 AM    RDW 16.2 01/05/2023 04:03 AM     01/05/2023 04:03 AM     Lab Results   Component Value Date    INR 1.02 01/03/2023    PROTIME 13.3 01/03/2023        Scheduled Meds:    enoxaparin  40 mg SubCUTAneous Daily    aspirin  81 mg Oral Daily    ceFAZolin  2,000 mg IntraVENous Q8H    sodium chloride flush  5-40 mL IntraVENous 2 times per day     Continuous Infusions:    sodium chloride      niCARdipine Stopped (01/04/23 5467)    sodium chloride         Imaging:  CT Abdomen Pelvis with IV Contrast - 1/3/2023  Impression  Atherosclerotic abdominal aorta with a moderate 6.5 cm fusiform aneurysm of the infrarenal aorta. There is moderate mural thrombus and plaque surrounding the aneurysm with a patent central lumen. There is focal wall irregularity posteriorly and inferiorly along the aneurysm with a 1.7 cm hypodensity extending along the left psoas muscle which represent a small pseudoaneurysm with no contrast extravasation. There is some mild stranding and edema along the wall of the aneurysm anteriorly which could represent aortitis or possible very early minimal leaking around the aneurysm but is otherwise of  uncertain etiology. Recommend vascular surgery consultation. Mild chronic liver changes with fatty replacement throughout and a 1.8 cm subcapsular hyperdensity along the right lobe laterally which could represent geographic fatty replacement or possible early lesion in the liver. Recommend follow-up or MRI correlation. Moderate diverticulosis of the left colon with no pericolonic inflammation.      Mild prostatic enlargement with diffuse mild bladder wall thickening which may just be due to poor distention but is indeterminate. Recommend follow-up urological follow-up. Surgical clips along the suprapubic region. Moderate dilatation of the visualized ascending aorta measuring up to 5 cm. Recommend follow-up. Assessment:   -POD #1 Endovascular Abdominal Aortic Aneurysm Repair, Right Femoral Endarterectomy with Patch and Coil Embolization of Right Distal Accessory Renal Artery with Dr. Nataliia Gonzalez  -Abdominal pain significantly improved/resolved, abdomen non-tender  -Mild Right Groin Pain from incision  -Right Groin incision intact, no signs of hematoma or significant swelling  -Left Groin Puncture site soft with no signs of hematoma  -Distal Doppler signals intact to BLE  -Hgb and Creatinine stable today    Plan:  -D/C Arterial Line, [place] and IVF  -Up to chair/OOB  -Urology Consult noted  -ID Consult noted  -HTN management per Primary Team - d/w Dr. Vania Worthy - appreciate input  -May need to be aware of nephrotoxins given coil embolization of Right Distal Accessory Renal Artery with procedure yesterday  -Can consider possible D/C later today from surgical standpoint if pt continues to progress, if no contraindications arise and if otherwise medically stable for discharge  -Should pt D/C today, will need to F/U with Dr. Nataliia Gonzalez in 2 weeks for post-op visit  -Recommend pt gets a BMP checked in 1 week with f/u with PCP for Renal Function monitoring    All pertinent information and plan of care discussed with Dr. Nicole Santos. All questions and concerns were addressed with the patient. I have discussed the above stated plan with the patient and the nurse. The patient verbalized understanding and agreed with the plan.     Thank you for allowing to us to participate in the care of Acacia Coyne Sr      Electronically signed by LELE Rojas CNP on 1/5/2023 at 8:30 AM

## 2023-01-05 NOTE — CARE COORDINATION
01/05/23 1510   IMM Letter   IMM Letter given to Patient/Family/Significant other/Guardian/POA/by: presented to patient by serge De Leon   IMM Letter date given: 01/05/23   IMM Letter time given: 0434

## 2023-01-05 NOTE — PLAN OF CARE
Problem: Pain  Goal: Verbalizes/displays adequate comfort level or baseline comfort level  1/5/2023 1003 by Karla Espinosa RN  Outcome: Progressing  Flowsheets (Taken 1/5/2023 8070)  Verbalizes/displays adequate comfort level or baseline comfort level:   Encourage patient to monitor pain and request assistance   Assess pain using appropriate pain scale   Administer analgesics based on type and severity of pain and evaluate response   Implement non-pharmacological measures as appropriate and evaluate response   Consider cultural and social influences on pain and pain management   Notify Licensed Independent Practitioner if interventions unsuccessful or patient reports new pain  1/5/2023 0443 by Gilmar Oliveira RN  Outcome: Progressing     Problem: ABCDS Injury Assessment  Goal: Absence of physical injury  1/5/2023 1003 by Karla Espinosa RN  Outcome: Progressing  1/5/2023 0443 by Gilmar Oliveira RN  Outcome: Progressing     Problem: Discharge Planning  Goal: Discharge to home or other facility with appropriate resources  1/5/2023 1003 by Karla Espinosa RN  Outcome: Progressing  1/5/2023 0443 by Gilmar Oliveira RN  Outcome: Progressing

## 2023-01-05 NOTE — PROGRESS NOTES
4 Eyes Skin Assessment     NAME:  Shaka Ivey Sr  YOB: 1950  MEDICAL RECORD NUMBER:  [id number]    The patient is being assessed for  Shift Handoff    I agree that One RN have performed a thorough Head to Toe Skin Assessment on the patient. ALL assessment sites listed below have been assessed. Areas assessed by both nurses:    Head, Face, Ears, Shoulders, Back, Chest, Arms, Elbows, Hands, Sacrum. Buttock, Coccyx, Ischium, and Legs. Feet and Heels        Does the Patient have a Wound?  No noted wound(s)       Ashok Prevention initiated by RN: No   Wound Care Orders initiated by RN: No    Pressure Injury (Stage 3,4, Unstageable, DTI, NWPT, and Complex wounds) if present place referral order by RN under : NA    New and Established Ostomies, if present place, referral order under : NA      Nurse 1 eSignature: Electronically signed by Awais Hagen RN on 1/5/23 at 7:18 AM EST    **SHARE this note so that the co-signing nurse is able to place an eSignature**    Nurse 2 eSignature: Electronically signed by Bryan Pemberton RN on 1/5/23 at 7:41 AM EST

## 2023-01-05 NOTE — ACP (ADVANCE CARE PLANNING)
Advance Care Planning     Advance Care Planning Activator (Inpatient)  Conversation Note      Date of ACP Conversation: 1/5/2023     Conversation Conducted with: Reece Saver, friend Janie Ezequiel with Decision Making Capacity    ACP Activator: [address] Decision Maker:     Legally would be his son. He does not want his son to be his Decision maker. Education given regarding [employer] of decision making. Offered to provide him with abilty to make [facility] today before he goes home. Education given regarding of who can make decisions for him. Current Designated Health Care Decision Maker:     Click here to complete Healthcare Decision Makers including section of the Healthcare Decision Maker Relationship (ie \"Primary\")      Care Preferences    Ventilation: \"If you were in your present state of health and suddenly became very ill and were unable to breathe on your own, what would your preference be about the use of a ventilator (breathing machine) if it were available to you? \"      Would the patient desire the use of ventilator (breathing machine)?: yes    \"If your health worsens and it becomes clear that your chance of recovery is unlikely, what would your preference be about the use of a ventilator (breathing machine) if it were available to you? \"     Would the patient desire the use of ventilator (breathing machine)?: No      Resuscitation  \"CPR works best to restart the heart when there is a sudden event, like a heart attack, in someone who is otherwise healthy. Unfortunately, CPR does not typically restart the heart for people who have serious health conditions or who are very sick. \"    \"In the event your heart stopped as a result of an underlying serious health condition, would you want attempts to be made to restart your heart (answer \"yes\" for attempt to resuscitate) or would you prefer a natural death (answer \"no\" for do not attempt to resuscitate)? \" yes [] Yes   [] No   Educated Patient / Michael Peacock regarding differences between Advance Directives and portable DNR orders. Length of ACP Conversation in minutes:      Conversation Outcomes:  [x] ACP discussion completed  [] Existing advance directive reviewed with patient; no changes to patient's previously recorded wishes  [] New Advance Directive completed  [] Portable Do Not Rescitate prepared for Provider review and signature  [] POLST/POST/MOLST/MOST prepared for Provider review and signature      Follow-up plan:    [x] Schedule follow-up conversation to continue planning  [] Referred individual to Provider for additional questions/concerns   [] Advised patient/agent/surrogate to review completed ACP document and update if needed with changes in condition, patient preferences or care setting    [] This note routed to one or more involved healthcare providers  Pt was agreeable to taking Adv Directive paperwork home with him today to review but he does not want to sign anything. Referral made to Spiritual Care Team to deliver or tube down the paperwork for him to take home. Updated Bedside RNRobert of above.     [facility]     Case Management   [phone]    1/5/2023  3:13 PM

## 2023-01-05 NOTE — DISCHARGE INSTRUCTIONS
-Call to schedule a follow up with Dr. Anoop Santana in 2 weeks.  -Please call Dr. Flor Lino office at [phone] to make an appointment.    -Avoid any heavy lifting more than 5 lbs or strenuous exercise until seen by Dr. Anoop Santana for follow up and given permission to do so. -You may walk in moderation.  -Try not to climb up and down steps more than necessary for the first 2 weeks after surgery.  -Keep incision(s) clean and dry. -You may shower beginning on 1/8/2023.  -When showering, you can let the water run over your incision(s), try not to let the water hit your incision(s) directly. -Do not take a bath, swim or submerge your groin incisions in water until given permission by Dr. Anoop Santana to do so  -Do not rub your scrub your incision(s), gently cleanse them to keep them clean. -You may pat your incision(s) dry with a towel afterwards. -If you begin to notice clear drainage, you may place a loose dressing over your incision(s). -If you begin to notice any infectious appearing drainage, excessive swelling near your incision, fever, chills, nausea or vomiting, please call Dr. Flor Lino office immediately.  -If you have any questions or concerns, please do not hesitate to call Dr. Flor Lino office.    -Follow up in 1 week to get a Basic Metabolic Panel Lab test done - please schedule a follow up appointment with Dr. Yennifer Ellis to discuss the results       Femoral Endarterectomy: What to Expect at [address] had a femoral endarterectomy (say \"FEM--ll tf-swh-cet-REK-[facility]-teodoro\"). It was done to remove fatty buildup (plaque) from the femoral artery. You will have some pain from the cut (incision) the doctor made. This usually gets better after a couple of days. Your leg may be swollen at first. This may last 2 to 3 months. You will have stitches or staples in the incision. If you have stitches, they may dissolve on their own. Or your doctor may take them out 7 to 14 days after your surgery.   After surgery, blood may flow better throughout your leg, which can decrease leg pain, numbness, and cramping. You may be able to walk longer distances without leg pain. You will likely have regular checkups with your doctor to check your arteries. This care sheet gives you a general idea about how long it will take for you to recover. But each person recovers at a different pace. Follow the steps below to get better as quickly as possible. How can you care for yourself at home? Activity    Rest when you feel tired. Getting enough sleep will help you recover. Try to walk every day or as often as your doctor tells you. Start by walking a little more than you did the day before. Bit by bit, increase the amount you walk. Walking boosts blood flow and helps prevent pneumonia and constipation. Avoid strenuous activities, such as bicycle riding, jogging, weight lifting, or aerobic exercise, until your doctor says it is okay. Ask your doctor when you can drive again. You will probably need to take off 1 to 4 weeks from work. It depends on the type of work you do and how you feel. You may shower as usual. Do not take a bath for the first 2 weeks, or until your doctor tells you it is okay. Diet    You can eat your normal diet. If your stomach is upset, try bland, low-fat foods like plain rice, broiled chicken, toast, and yogurt. Drink plenty of fluids (unless your doctor tells you not to). You may notice that your bowel movements are not regular right after your surgery. This is common. You may want to take a fiber supplement every day. If you have not had a bowel movement after a couple of days, ask your doctor about taking a mild laxative. Medicines    Your doctor will tell you if and when you can restart your medicines. He or she will also give you instructions about taking any new medicines.      If you stopped taking aspirin or some other blood thinner, your doctor will tell you when to start taking it again. Be safe with medicines. Take your medicines exactly as prescribed. Call your doctor if you think you are having a problem with your medicine. Take pain medicines exactly as directed. If the doctor gave you a prescription medicine for pain, take it as prescribed. If you are not taking a prescription pain medicine, ask your doctor if you can take an over-the-counter medicine. If you think your pain medicine is making you sick to your stomach: Take your medicine after meals (unless your doctor has told you not to). Ask your doctor for a different pain medicine. If your doctor prescribed antibiotics, take them as directed. Do not stop taking them just because you feel better. You need to take the full course of antibiotics. Your doctor may prescribe a blood thinner when you go home. This helps prevent blood clots. Be sure you get instructions about how to take your medicine safely. Blood thinners can cause serious bleeding problems. Incision care    If you have strips of tape on the cut (incision) the doctor made, leave the tape on for a week or until it falls off. Or follow your doctor's instructions for removing the tape. Wash the area daily with warm, soapy water, and pat it dry. Don't use hydrogen peroxide or alcohol, which can slow healing. You may cover the area with a gauze bandage if it weeps or rubs against clothing. Change the bandage every day. Keep the area clean and dry. Follow-up care is a key part of your treatment and safety. Be sure to make and go to all appointments, and call your doctor if you are having problems. It's also a good idea to know your test results and keep a list of the medicines you take. When should you call for help? Call 911 anytime you think you may need emergency care. For example, call if:    You passed out (lost consciousness). You have trouble breathing.    Call your doctor now or seek immediate medical care if: You have severe pain in your leg, or it becomes cold, pale, blue, tingly, or numb. You have pain that does not get better after you take pain medicine. You have loose stitches, or your incision comes open. You are bleeding a lot from the incision. You have signs of infection, such as: Increased pain, swelling, warmth, or redness. Red streaks leading from the incision. Pus draining from the incision. A fever. You are sick to your stomach or cannot keep fluids down. Watch closely for any changes in your health, and be sure to contact your doctor if:    You do not get better as expected. Where can you learn more? Go to http://www.woods.com/ and enter P886 to learn more about \"Femoral Endarterectomy: What to Expect at Home. \"  Current as of: September 7, 2022               Content Version: 13.5  © 2006-2022 [employer]. Care instructions adapted under license by [facility] ([facility]). If you have questions about a medical condition or this instruction, always ask your healthcare professional. [place] [address] any warranty or liability for your use of this information. Endovascular Aortic Aneurysm Repair: What to Expect at Home  Your Recovery  Endovascular aortic aneurysm repair is a procedure to fix a weak and bulging section of the aorta. The aorta is the large blood vessel (artery) that carries blood from the heart through the belly to the rest of the body. The doctor used thin tubes, called catheters, to put a man-made tube called a graft inside the aneurysm. Blood will pass through the graft in the aorta without pushing on the aneurysm. You can expect the areas where the catheters were inserted to be sore for 1 to 2 weeks. If you have stitches or staples, the doctor may need to take them out. You may feel more tired than usual for 1 to 2 weeks after surgery. You may be able to do many of your usual activities after 1 to 2 weeks.  But you will probably need up to 4 weeks to fully recover. Be sure to tell your dentist and doctors that you have the graft. This is important because you may need to take antibiotics before certain procedures to prevent an infection. You will have regular tests, such as a CT scan or an ultrasound, to check for problems with the graft. You may have at least one test each year. This care sheet gives you a general idea about how long it will take for you to recover. But each person recovers at a different pace. Follow the steps below to get better as quickly as possible. How can you care for yourself at home? Activity    Rest when you feel tired. Getting enough sleep will help you recover. Try to walk each day. Start by walking a little more than you did the day before. Bit by bit, increase the amount you walk. Walking boosts blood flow and helps prevent pneumonia and constipation. Avoid strenuous activities, such as bicycle riding, jogging, weight lifting, or aerobic exercise. Your doctor will tell you when it's okay to do strenuous activity. Ask your doctor when you can drive again. You will probably need to take at least 1 to 2 weeks off from work. It depends on the type of work you do and how you feel. Diet    You can eat your normal diet. If your stomach is upset, try bland, low-fat foods like plain rice, broiled chicken, toast, and yogurt. Drink plenty of fluids (unless your doctor tells you not to). You may notice that your bowel movements are not regular right after your surgery. This is common. Try to avoid constipation and straining with bowel movements. You may want to take a fiber supplement every day. If you have not had a bowel movement after a couple of days, ask your doctor about taking a mild laxative. Medicines    Your doctor will tell you if and when you can restart your medicines. You will also get instructions about taking any new medicines.      If you stopped taking aspirin or some other blood thinner, your doctor will tell you when to start taking it again. Be safe with medicines. Take pain medicines exactly as directed. If the doctor gave you a prescription medicine for pain, take it as prescribed. If you are not taking a prescription pain medicine, ask your doctor if you can take an over-the-counter medicine. Do not take two or more pain medicines at the same time unless the doctor told you to. Many pain medicines have acetaminophen, which is Tylenol. Too much acetaminophen (Tylenol) can be harmful. If you think your pain medicine is making you sick to your stomach: Take your medicine after meals (unless your doctor has told you not to). Ask your doctor for a different pain medicine. If your doctor prescribed antibiotics, take them as directed. Do not stop taking them just because you feel better. You need to take the full course of antibiotics. Incision care    If you have strips of tape on the incisions, leave the tape on for a week or until it falls off. Wash the area daily with water and pat it dry. Other cleaning products, such as hydrogen peroxide, can make the wounds heal more slowly. You may cover the area with a gauze bandage if it weeps or rubs against clothing. Change the bandage every day. Keep the area clean and dry. You may shower 24 to 48 hours after the procedure, if your doctor okays it. Pat the incision dry. Do not soak the catheter sites until they are healed. Don't take a bath for 1 week, or until your doctor tells you it is okay. Watch for bleeding from the sites. A small amount of blood (up to the size of a quarter) on the bandage can be normal.     If you are bleeding, lie down and press on the area for 15 minutes to try to make it stop. If the bleeding does not stop, call your doctor or seek immediate medical care. Follow-up care is a key part of your treatment and safety.  Be sure to make and go to all appointments, and call your doctor if you are having problems. It's also a good idea to know your test results and keep a list of the medicines you take. When should you call for help? Call 911 anytime you think you may need emergency care. For example, call if:    You passed out (lost consciousness). You have severe trouble breathing. You have sudden chest pain and shortness of breath, or you cough up blood or foamy, pink mucus. You have a lump that is getting bigger under your skin where the incisions were made in your groin. You have severe pain in your belly. You have chest pain or pressure. This may occur with:  Sweating. Shortness of breath. Nausea or vomiting. Pain that spreads from the chest to the neck, jaw, or one or both shoulders or arms. Dizziness or lightheadedness. A fast or uneven pulse. After calling 911, chew 1 adult-strength or 2 to 4 low-dose aspirin. Wait for an ambulance. Do not try to drive yourself. Call your doctor now or seek immediate medical care if:    You have new or increased shortness of breath. You are dizzy or lightheaded, or you feel like you may faint. You are sick to your stomach or cannot keep fluids down. You have pain that does not get better after you take pain medicine. You have a fever over 100°F.     You have loose stitches, or one of your incisions comes open. Bright red blood has soaked through the bandage over your incisions. You have signs of infection, such as: Increased pain, swelling, warmth, or redness. Red streaks leading from the incisions. Pus draining from the incisions. Swollen lymph nodes in your neck, armpits, or groin. A fever. You have signs of a blood clot, such as:  Pain in your calf, back of the knee, thigh, or groin. Redness and swelling in your leg or groin.    Watch closely for any changes in your health, and be sure to contact your doctor if:    You have sudden weight gain, such as 3 pounds or more in 2 to 3 days. You have increased swelling in your legs, ankles, or feet. Where can you learn more? Go to http://www.woods.com/ and enter L455 to learn more about \"Endovascular Aortic Aneurysm Repair: What to Expect at Home. \"  Current as of: March 28, 2022               Content Version: 13.5  © 3098-8713 [employer], [employer]. Care instructions adapted under license by [facility] ([facility]). If you have questions about a medical condition or this instruction, always ask your healthcare professional. [place] [address] any warranty or liability for your use of this information.

## 2023-01-05 NOTE — CARE COORDINATION
01/05/23 1510   IMM Letter   IMM Letter given to Patient/Family/Significant other/Guardian/POA/by: presented to patient by serge Mendez   IMM Letter date given: 01/05/23   IMM Letter time given: 0018

## 2023-01-05 NOTE — CONSULTS
Consulting Physician: Dr. Eden Gayle    Reason for Consult: mild prostate enlargement with mild circumferential bladder wall thickening    History of Present Illness: Kaitlyn De La Torre is a 81 y.o. male with history of HLD, HTN, PAD who had a CT abdomen/pelvis performed as outpatient and was directed to come to the ED for an abdominal aortic aneurysm with questionable leaking. Yesterday, he underwent an endovascular abdominal aortic aneurysm repair - coil embolization of right distal accessory renal artery. Incidentally noted on the CT was mild prostate enlargement with mild circumferential bladder wall thickening. There is no bladder distention visualized on CT. Creatinine normal (0.9-1.1). Intra-operatively a vega catheter was placed for unknown amounts. He has been on Flomax x 1 year. He does report weak urinary stream, urgency, nocturia 5-6x/night. No history of UTI's. No FH prostate cancer. He did have 1 episode of GH over the summer, never happened before and did not get it evaluated, attributed it to lifting heavy items that day. He is a smoker about 1/2 PPD, has tried to quit with chantix but this caused nightmares.      Past Medical History:   Past Medical History:   Diagnosis Date    Hyperlipidemia     Hypertension     PAD (peripheral artery disease) (HCC)     stenting in left leg        Past Surgical History:  Past Surgical History:   Procedure Laterality Date    CHOLECYSTECTOMY      SHOULDER SURGERY Right     rotator cuff    TOE SURGERY Left        Social History:  Social History     Socioeconomic History    Marital status: Single     Spouse name: Not on file    Number of children: Not on file    Years of education: Not on file    Highest education level: Not on file   Occupational History    Not on file   Tobacco Use    Smoking status: Every Day     Packs/day: 0.25     Years: 38.00     Pack years: 9.50     Types: Cigarettes     Start date: 1968    Smokeless tobacco: Never    Tobacco comments: cutting down as high as 3 pk/d to as low . 25    Vaping Use    Vaping Use: Never used   Substance and Sexual Activity    Alcohol use: Yes     Comment: rarely    Drug use: Never    Sexual activity: Not on file   Other Topics Concern    Not on file   Social History Narrative    Not on file     Social Determinants of Health     Financial Resource Strain: Low Risk     Difficulty of Paying Living Expenses: Not hard at all   Food Insecurity: No Food Insecurity    Worried About [address] in the Last Year: Never true    [address] in the Last Year: Never true   Transportation Needs: No Transportation Needs    Lack of Transportation (Medical): No    Lack of Transportation (Non-Medical):  No   Physical Activity: Inactive    Days of Exercise per Week: 7 days    Minutes of Exercise per Session: 0 min   Stress: Not on file   Social Connections: Not on file   Intimate Partner Violence: Not on file   Housing Stability: Not on file       Family History:  Family History   Problem Relation Age of Onset    Cancer Mother         uterine cancer     Other Father         MVA    Cancer Sister         breast    Cancer Brother         brain     Cancer Maternal Aunt         breast    No Known Problems Maternal Grandmother     No Known Problems Maternal Grandfather     No Known Problems Paternal Grandmother     No Known Problems Paternal Grandfather        Meds:   Current Facility-Administered Medications: 0.9 % sodium chloride infusion, , IntraVENous, PRN  enoxaparin (LOVENOX) injection 40 mg, 40 mg, SubCUTAneous, Daily  aspirin EC tablet 81 mg, 81 mg, Oral, Daily  0.9 % sodium chloride infusion, , IntraVENous, Continuous  niCARdipine (CARDENE) 25 mg in sodium chloride 0.9 % 250 mL infusion, 3-15 mg/hr, IntraVENous, Continuous  HYDROmorphone (DILAUDID) injection 0.5 mg, 0.5 mg, IntraVENous, Q3H PRN  ceFAZolin (ANCEF) 2,000 mg in dextrose 5 % 50 mL IVPB (mini-bag), 2,000 mg, IntraVENous, Q8H  albuterol sulfate HFA (PROVENTIL;VENTOLIN;PROAIR) 108 (90 Base) MCG/ACT inhaler 2 puff, 2 puff, Inhalation, 4x Daily PRN  sodium chloride flush 0.9 % injection 5-40 mL, 5-40 mL, IntraVENous, 2 times per day  sodium chloride flush 0.9 % injection 5-40 mL, 5-40 mL, IntraVENous, PRN  0.9 % sodium chloride infusion, , IntraVENous, PRN  ondansetron (ZOFRAN-ODT) disintegrating tablet 4 mg, 4 mg, Oral, Q8H PRN **OR** ondansetron (ZOFRAN) injection 4 mg, 4 mg, IntraVENous, Q6H PRN  polyethylene glycol (GLYCOLAX) packet 17 g, 17 g, Oral, Daily PRN  acetaminophen (TYLENOL) tablet 650 mg, 650 mg, Oral, Q6H PRN **OR** acetaminophen (TYLENOL) suppository 650 mg, 650 mg, Rectal, Q6H PRN  perflutren lipid microspheres (DEFINITY) injection 1.5 mL, 1.5 mL, IntraVENous, ONCE PRN  labetalol (NORMODYNE;TRANDATE) injection 20 mg, 20 mg, IntraVENous, Q10 Min PRN    Review of Systems:  10 Systems were reviewed and negative except as in HPI    Vitals:  BP (!) 121/55   Pulse 64   Temp 98 °F (36.7 °C) (Oral)   Resp 11   Ht 6' 2\" (1.88 m)   Wt 202 lb 6.1 oz (91.8 kg)   SpO2 94%   BMI 25.98 kg/m²     Intake/Output Summary (Last 24 hours) at 1/5/2023 6180  Last data filed at 1/5/2023 3150  Gross per 24 hour   Intake 2338.1 ml   Output 1680 ml   Net 658.1 ml       Physical Exam:  General Appearance: Alert and oriented, cooperative, no distress, appears stated age  Head: Normocephalic, without obvious abnormality, atraumatic  Back: no CVA tenderness  Lungs: respirations unlabored, no wheezing  Heart: Regular rate and rhythm, no lower extremity edema noted  Abdomen: Soft, non-tender, non-distended, no masses  Skin: Skin color, texture, turgor normal, no rashes or lesions  Neurologic: no gross deficits  Male :  Nonpalpable bladder  No CVA tenderness  Knox catheter with clear/yellow output  MILENA deferred, discussed this will be performed as outpatient    Labs:  CBC   Lab Results   Component Value Date/Time    WBC 13.2 01/05/2023 04:03 AM    RBC 4.09 01/05/2023 04:03 AM    HGB 11.2 01/05/2023 04:03 AM    HCT 33.5 01/05/2023 04:03 AM    MCV 81.8 01/05/2023 04:03 AM    MCH 27.4 01/05/2023 04:03 AM    MCHC 33.5 01/05/2023 04:03 AM    RDW 16.2 01/05/2023 04:03 AM     01/05/2023 04:03 AM    MPV 8.8 01/05/2023 04:03 AM     BMP   Lab Results   Component Value Date/Time     01/05/2023 04:03 AM    K 4.2 01/05/2023 04:03 AM     01/05/2023 04:03 AM    CO2 17 01/05/2023 04:03 AM    BUN 14 01/05/2023 04:03 AM    CREATININE 1.0 01/05/2023 04:03 AM    GLUCOSE 135 01/05/2023 04:03 AM    CALCIUM 8.1 01/05/2023 04:03 AM       Urinalysis:   Lab Results   Component Value Date/Time    COLORU ORANGE 08/08/2019 09:04 AM    GLUCOSEU NEG 08/08/2019 09:04 AM    BLOODU MODERATE 08/08/2019 09:04 AM    LEUKOCYTESUR MODERATE 08/08/2019 09:04 AM       Imaging:  Pertinent images and radiologist's report were reviewed independently  CT abdomen/pelvis 1/3/23  Impression   Atherosclerotic abdominal aorta with a moderate 6.5 cm fusiform aneurysm of   the infrarenal aorta. There is moderate mural thrombus and plaque   surrounding the aneurysm with a patent central lumen. There is focal wall   irregularity posteriorly and inferiorly along the aneurysm with a 1.7 cm   hypodensity extending along the left psoas muscle which represent a small   pseudoaneurysm with no contrast extravasation. There is some mild stranding   and edema along the wall of the aneurysm anteriorly which could represent   aortitis or possible very early minimal leaking around the aneurysm but is   otherwise of uncertain etiology. Recommend vascular surgery consultation. Mild chronic liver changes with fatty replacement throughout and a 1.8 cm   subcapsular hyperdensity along the right lobe laterally which could represent   geographic fatty replacement or possible early lesion in the liver. Recommend follow-up or MRI correlation. Moderate diverticulosis of the left colon with no pericolonic inflammation. Mild prostatic enlargement with diffuse mild bladder wall thickening which   may just be due to poor distention but is indeterminate. Recommend follow-up   urological follow-up. Surgical clips along the suprapubic region. Moderate dilatation of the visualized ascending aorta measuring up to 5 cm. Recommend follow-up. The findings were called to Dr. Miguel Ocampo at 6:30 p.m. Impression/Plan:  - 72y. o. male s/p abdominal aortic aneurysm repair and embolization of right distal accessory renal artery on 1/4/23. Consulted for incidental finding of mild prostate enlargement and mild circumferential bladder wall thickening.   - Re-start Flomax daily  - Remove vega catheter when medically ready  - Will f/u outpatient in 2-3 weeks to discuss urologic procedures in further detail- urolift/TURP and arrange cystoscopy, TRUSP - also ensure nothing cystoscopically causing GH episode in summer. Urology to sign off, call with questions.     LELE Langston - CNP 1/5/20238:21 AM

## 2023-01-05 NOTE — OP NOTE
[facility]           [address]                                OPERATIVE REPORT    PATIENT NAME: Kristina Pearson                  :        1950  MED REC NO:   [id number]                          ROOM:       1301  ACCOUNT NO:   [de-identified]                           ADMIT DATE: 2023  PROVIDER:     Rocio Bean DO    DATE OF PROCEDURE:  2023    PREOPERATIVE DIAGNOSIS:  Symptomatic inflammatory abdominal aortic  aneurysm. POSTOPERATIVE DIAGNOSIS:  Symptomatic inflammatory abdominal aortic  aneurysm. OPERATION PERFORMED:  1. Right femoral artery cut down with common femoral endarterectomy  with bovine pericardial patch angioplasty. 2.  Percutaneous access and closure using a large bore device with a  14-Polish sheath on the left with two ProGlide suture. 3.  Coil embolization of the right distal accessory renal artery using a  6 followed by 4 mm Terumo coil. 4.  Repair of abdominal aortic aneurysm using a [place] Excluder  Conformable Endoprosthesis, 36 mm proximal neck fixation, and two  docking limbs of 27 mm on the right and 23 mm on the left. SURGEON:  Rcoio Bean DO    ASSISTANT:  Jevon Valle. ESTIMATED BLOOD LOSS:  About 100 mL. COMPLICATIONS:  None apparent. ANESTHESIA:  General.    INDICATIONS:  This is a 68-year-old male patient who presented to the  hospital emergency room last night. He is endorsing several days with  abdominal and back pain. He had actually presented originally to his  primary care doctor. His primary care doctor ordered a CT scan which is  what prompted the visit to the emergency room because there was some  concern on the actual CAT scan as a leaking aneurysm. He had no  knowledge of the aneurysm. He does smoke. He has poor dentition. He  denies any recent sick exposures. He does have some claudication  bilaterally. He lives here with his son.   His son actually brought him  to the emergency room due to the pain. Once again it was the abdominal  and back pain. CT scan demonstrated mainly there was significant  stranding around his aorta. Once again concerning for possible  inflammation or at least they rule out infection. However, Once again,  he did not endorse any other reasons other than poor dentition to  actually have elicited infection such as urinary tract infection or  recent upper respiratory illness or any exposure to lifestyle. On  examination of the CT scan, unfortunately the patient has a very ectatic  aorta, severely diseased. He has a significantly complicated aneurysm  morphology. 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 The left renal artery is patent and solitary. 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 He denies any history of procedures from that  perspective, but he is somewhat of a forgetful person and poor  historian. Therefore, considerations for repair were between  endovascular and open repair. Unfortunately that distal right renal  artery probably being sacrificed either way. It could be reimplanted  from an open perspective, but given the inflammatory nature that was  deemed to be quite risky in this patient who does not really followup  with regular evaluations. From a endovascular perspective, discussion  with the representatives from [place], we decided that he was a reasonable  candidate for endovascular repair. We also decided in discussion with  my partner, Dr. Oksana Richard, that the best thing to do is try embolize the  distal renal artery. This was clearly dictated to the family and patient  before and the risks of nephropathy and also renal insufficiency that  could come about.   Also sometimes discomfort and pain that can come  along with renal infarct as a consequence from that process as well. In  order to gain the maximum possible seal from an endovascular perspective  This had to be done. The remaining risks included pain, infection, bleeding,  embolization, thrombosis, pseudoaneurysm as well as risk of anesthesia  including MI, stroke, death, renal failure, respiratory failure clearly  dictated to the patient and family, which included son and sister, which  were present for all the conversation. He signed written informed  consent for repair. The planned repair was right femoral artery  cut down with endarterectomy and then treatment of the aneurysm with  embolization of right renal artery and there was also reserved plan to  do an open conversion if this did not seal.  He signed written informed  consent. OPERATIVE PROCEDURE:  The patient was brought into the [facility] and  placed supine on the table. He underwent general anesthesia. He  received perioperative antibiotics. Blood cultures were drawn  preoperatively. The entire abdomen as well as bilateral groins were  prepped and draped in the usual sterile fashion. Time-out was called  for indication, patient, and laterality. We began by making an oblique  incision over the common femoral artery. It did have a pulse. A  dissection carried down to the skin and subcutaneous tissue. We  identified a very diseased and tattered femoral artery. I obtained  proximal control of the distal external iliac at the inguinal ligament. I obtained distal control of the superficial femoral artery and profunda  femoris both of which had disease and significant plaque. Once again,  he does endorse some claudication, which is not lifestyle-limiting,  therefore the plan was not to be aggressive with endarterectomy end points. My plan is just to create a platform for  delivery of the endoprosthesis.   After giving the patient weight-based  dose of heparin and planned to circulate for 3 minutes and clamped the  artery sequentially. I made an arteriotomy and then opened it up and  found to be very diseased lumen within the artery that was very clearly  evident on the CAT scan. This adds credence to the fact that it would  not be able to have a percutaneous access. After careful eversion of  the plaque out of the artery and tacked down to distal endpoints both  proximally and distally using a 5-0 Prolene. All small pieces were  removed as well from the artery. I did note that I inadvertently  created hole in the back wall of the artery that had to be repaired  using a running 6-0 Prolene suture. After this, I irrigated heparinized  saline. There was no significant free debris. I backbled the SFA as  well as profunda and had bleeding. I had forward bleeding as well. I  went ahead and decided to bring up a Bovine pericardial patch, it was a  2 x 9 patch. I shaped it accordingly. I on laid the patch using a  running 5-0 Prolene suture starting proximally and then moving distally. I deaired it before tying it down and made sure forward and backward  flushed it. After this and after additional sutures, which required  hemostasis, we then proceeded to perform the endovascular portion. I  used ultrasound to access the left side. I identified the mid common  femoral artery. I used a micropuncture needle and directly visualized  it entering the mid common femoral artery. I placed a micro-introducer. I then placed a Bentson wire. I spread the soft tissue down to the  artery using a hemostat. After this, I placed a 6-Greenlandic sheath to  dilate and then placed two ProGlide sutures; one at the 10 and one at  the 2 o'clock position. Following this, we then placed the 14-[place]ic [place] dry  sterile sheath into the mid portion of the aortic aneurysm. On the  right side just because of her disease in entire distal external iliac  artery, we had to be very careful.   I reserved the right to treat that  with endovascular therapy at the end. I used a micro needle as well as  the introducer directly access the mid portion of the patch. I placed a  Bentson wire followed by a 6-[place]i sheath. I navigated into the aorta  accordingly. We exchanged for a Advantage Glidewire and then proceeded  to place a 16-[place] dry sterile sheath into the right side. I  navigated up into the aortic aneurysm. At this point in time, I  navigated the flush catheter up into the mid portion of the aorta just  proximal to the aneurysm. I performed an arteriogram in MICHEAL projection, which  demonstrated the left renal artery and a more cephalad right renal  artery which does not fill very easily and then the accessory right  renal which does fill quite briskly. It does appear to have stenosis  near its origin, therefore I suspect covering it might not result in the  leak, but to be on the safe side, I was able to navigate a C2 Glidecath  into it. I placed a 6 mm followed by 4 mm Terumo coil to essentially  terminate flow that could be retrograde. The seal is going to be quite  tenuous between the left renal artery and the actual dilatation of the  aorta. Therefore, I think the best option was to do this in order to  mitigate any risk, possible backbleeding and eventually neck expansion. After we successfully did this, we then proceeded to perform the  endovascular portion of the aneurysm repair. I brought up the 36 mm  graft and then proceeded to shoot another arteriogram with the  appropriate orthogonal projections, which was about 30 degrees of  Cranial AP projection. I then released the graft. I did re-constrain it, pushed it  up a little bit higher, wanting the graft to open into the ostia of the left renal artery. Left renal artery does have what  appears to be a less than 50% stenosis beyond its origin and is slightly  aneurysmal in its origin as well.   This also add credence to the fact  that an endovascular solution would be better just because of the risk  of difficulty with suture with holding itself in the renal arteries. The graft was then deployed and  I moved down and cannulated the actual gate using from  the side of the 14-Micronesian sheath using a Glidewire. Once cannulated,  I placed a flush catheter into the main body of the graft, spun it to  confirm patency, and then proceeded to replace using an Advantage  Guidewire, stiff wire for delivery of the left contralateral left iliac  limb. Using RENEE and caudal projection, I identified the origin of the  hypogastric. Using a sizing catheter, I was able to measure the  distance. We selected a 23 mm x 14 cm extension to that prosthesis. I  deployed the graft accordingly. After that, I then turned my attention  to completion of deployment of the main body of the graft. I removed  the main body delivery device and then proceeded to shot an arteriogram,  which demonstrate in the origin of the hypogastric on the right side. I  selected 27 x 10 cm length graft. I placed it within its space and  deployed it appropriately above the hypogastric. Following this, we  then performed angioplasty of the entire graft and molded using the MOB  balloon. After this was completed, we then proceeded to shoot an  arteriogram.  Arteriography demonstrates the graft was appropriately  placed right at the origin of the left renal artery. There was no  evidence of type 1A or type 1B endoleak. Lumbars were patent but no obvious filling of the sac. Both iliac limbs were patent and  both hypogastric patent. There was excellent flow down the graft. Now,  since we are reasonably satisfied, we went ahead and decided to move  towards termination of the procedure. I was slightly concerned about  the origin of more cephalad renal.  I did place a catheter and taken  down the picture. It is quite faintly filling, but it is filling.   Once  again this was risk associated with loss of the right-sided kidney  function. I did once again inspect the distal endpoint of my patch  At the distal external iliac artery. I carefully marked the  insertion of the actual sheath to the Bovine pericardial patch with  hemostat. I brought the sheath down to just above that and shot an  arteriogram of the right side, which demonstrated that the iliac artery  appears to be widely patent. There was no evidence of flow-limiting  stenosis. Therefore, I felt no obligation to stent this area. After  this was completed, I then proceeded to remove on the right side the  wires and catheters. I re-clamped the vessel very carefully using  vessel loops and then forward flushed the sheath removal and had a good  excellent backward bleeding. I then closed the opening with a running  6-0 Prolene suture. This was hemostatic. On the left side, we  carefully removed the sheath over the wire and tied down our two  ProGlide sutures with good hemostasis. There was no evidence of  hematoma at the end of this procedure. The patient was extubated and  taken back to the ICU in stable condition. I fully expect him to have  some discomfort since the just inferior portion of the right kidney is  going to be embolized and likely will lose his perfusion. Hopefully,  his inflammatory discomfort will resolve gently over time. At this  point in time, we will keep an eye on his blood pressure, keep the  systolic below 794 now that he is hopefully formally repaired. There  were no immediate complications. I was present and performed all  critical aspects of this procedure. All sponge and needle counts were  correct x2.         Jatinder Prince DO    D: 01/04/2023 16:18:55       T: 01/04/2023 23:52:15     [email or web address]  Job#: [id number]     Doc#: [id number]

## 2023-01-06 PROBLEM — R10.84 GENERALIZED ABDOMINAL PAIN: Status: ACTIVE | Noted: 2023-01-06

## 2023-01-06 PROBLEM — F17.200 SMOKING: Status: ACTIVE | Noted: 2023-01-06

## 2023-01-06 PROBLEM — I71.43 ANEURYSM OF INFRARENAL ABDOMINAL AORTA (HCC): Status: ACTIVE | Noted: 2023-01-06

## 2023-01-06 PROBLEM — Z86.79 STATUS POST ENDOVASCULAR ANEURYSM REPAIR (EVAR): Status: ACTIVE | Noted: 2023-01-06

## 2023-01-06 PROBLEM — I73.9 PVD (PERIPHERAL VASCULAR DISEASE) (HCC): Status: ACTIVE | Noted: 2023-01-06

## 2023-01-06 PROBLEM — Z98.890 STATUS POST ENDOVASCULAR ANEURYSM REPAIR (EVAR): Status: ACTIVE | Noted: 2023-01-06

## 2023-01-06 LAB
BLOOD BANK DISPENSE STATUS: NORMAL
BLOOD BANK DISPENSE STATUS: NORMAL
BLOOD BANK PRODUCT CODE: NORMAL
BLOOD BANK PRODUCT CODE: NORMAL
BPU ID: NORMAL
BPU ID: NORMAL
DESCRIPTION BLOOD BANK: NORMAL
DESCRIPTION BLOOD BANK: NORMAL

## 2023-01-08 LAB
BLOOD CULTURE, ROUTINE: NORMAL
CULTURE, BLOOD 2: NORMAL

## 2023-01-10 ENCOUNTER — TELEPHONE (OUTPATIENT)
Dept: SURGERY | Age: 73
End: 2023-01-10

## 2023-01-10 NOTE — TELEPHONE ENCOUNTER
Pt informed Dr Rosi Javed is an internal medicine physician he saw while hospitalized. Dr Uday Hudson will order future refills of the new BP medication. He is concerned that his BP readings at home have consistently been running 140's over 90's. Pt reports being asymptomatic. Denies headaches, dizziness or lightheadedness.

## 2023-01-10 NOTE — TELEPHONE ENCOUNTER
Pt called with BP numbers. When he was in the hospital amlodipine was added to his medications. He has gotten 143/92 and 147/90. The patient saw multiple doctors during his stay and had surgery from Dr Jada Daigle. Dr Toi Yan sen the medication in for the patient. I explained I wasn't sure who Dr Toi Yan was, maybe a hospitalist?!? Maybe the patient should contact PCP. Please call the patient and let him know.  Thanks

## 2023-01-10 NOTE — PROGRESS NOTES
4 Eyes Skin Assessment     NAME:  Celena Mccurdy Sr  YOB: 1950  MEDICAL RECORD NUMBER:  [id number]    The patient is being assessed for  Shift Handoff    I agree that One RN have performed a thorough Head to Toe Skin Assessment on the patient. ALL assessment sites listed below have been assessed. Areas assessed by both nurses:    Head, Face, Ears, Shoulders, Back, Chest, Arms, Elbows, Hands, Sacrum. Buttock, Coccyx, Ischium, and Legs. Feet and Heels        Does the Patient have a Wound?  No noted wound(s)       Ashok Prevention initiated by RN: No   Wound Care Orders initiated by RN: No    Pressure Injury (Stage 3,4, Unstageable, DTI, NWPT, and Complex wounds) if present place referral order by RN under : NA    New and Established Ostomies, if present place, referral order under : NA      Nurse 1 eSignature: Electronically signed by Alyson Novak RN on 1/4/23 at 6:45 AM EST    **SHARE this note so that the co-signing nurse is able to place an eSignature**    Nurse 2 eSignature: Electronically signed by Alejandra Lopez RN on 1/4/23 at 7:16 AM EST anxious appearance

## 2023-01-13 DIAGNOSIS — R10.32 LEFT LOWER QUADRANT ABDOMINAL PAIN: ICD-10-CM

## 2023-01-13 DIAGNOSIS — Z86.79 STATUS POST ENDOVASCULAR ANEURYSM REPAIR (EVAR): ICD-10-CM

## 2023-01-13 DIAGNOSIS — Z86.79 STATUS POST ENDOVASCULAR ANEURYSM REPAIR (EVAR): Primary | ICD-10-CM

## 2023-01-13 DIAGNOSIS — Z98.890 STATUS POST ENDOVASCULAR ANEURYSM REPAIR (EVAR): Primary | ICD-10-CM

## 2023-01-13 DIAGNOSIS — Z98.890 STATUS POST ENDOVASCULAR ANEURYSM REPAIR (EVAR): ICD-10-CM

## 2023-01-13 DIAGNOSIS — I71.40 ABDOMINAL AORTIC ANEURYSM (AAA) GREATER THAN 5.5 CM IN DIAMETER IN MALE: ICD-10-CM

## 2023-01-13 LAB
A/G RATIO: 1.3 (ref 1.1–2.2)
ALBUMIN SERPL-MCNC: 3.8 G/DL (ref 3.4–5)
ALP BLD-CCNC: 111 U/L (ref 40–129)
ALT SERPL-CCNC: 15 U/L (ref 10–40)
ANION GAP SERPL CALCULATED.3IONS-SCNC: 15 MMOL/L (ref 3–16)
AST SERPL-CCNC: 14 U/L (ref 15–37)
BILIRUB SERPL-MCNC: <0.2 MG/DL (ref 0–1)
BUN BLDV-MCNC: 15 MG/DL (ref 7–20)
CALCIUM SERPL-MCNC: 9.3 MG/DL (ref 8.3–10.6)
CHLORIDE BLD-SCNC: 102 MMOL/L (ref 99–110)
CO2: 24 MMOL/L (ref 21–32)
CREAT SERPL-MCNC: 1.1 MG/DL (ref 0.8–1.3)
GFR SERPL CREATININE-BSD FRML MDRD: >60 ML/MIN/{1.73_M2}
GLUCOSE BLD-MCNC: 106 MG/DL (ref 70–99)
HCT VFR BLD CALC: 34.9 % (ref 40.5–52.5)
HEMOGLOBIN: 11.8 G/DL (ref 13.5–17.5)
MCH RBC QN AUTO: 27.8 PG (ref 26–34)
MCHC RBC AUTO-ENTMCNC: 33.6 G/DL (ref 31–36)
MCV RBC AUTO: 82.6 FL (ref 80–100)
PDW BLD-RTO: 15.8 % (ref 12.4–15.4)
PLATELET # BLD: 316 K/UL (ref 135–450)
PMV BLD AUTO: 9.2 FL (ref 5–10.5)
POTASSIUM SERPL-SCNC: 4.3 MMOL/L (ref 3.5–5.1)
RBC # BLD: 4.23 M/UL (ref 4.2–5.9)
SODIUM BLD-SCNC: 141 MMOL/L (ref 136–145)
TOTAL PROTEIN: 6.7 G/DL (ref 6.4–8.2)
WBC # BLD: 10.1 K/UL (ref 4–11)

## 2023-01-13 NOTE — TELEPHONE ENCOUNTER
Called pt to follow up. BP's are still running in the 140's over 90's. Advised to call his PCP for a hospital follow up appointment to see if the dose needs to be increased. Pt has a 90 d supply of the medication.

## 2023-01-19 ENCOUNTER — TELEPHONE (OUTPATIENT)
Dept: FAMILY MEDICINE CLINIC | Age: 73
End: 2023-01-19

## 2023-01-19 ENCOUNTER — OFFICE VISIT (OUTPATIENT)
Dept: VASCULAR SURGERY | Age: 73
End: 2023-01-19

## 2023-01-19 DIAGNOSIS — Z98.890 STATUS POST ENDOVASCULAR ANEURYSM REPAIR (EVAR): Primary | ICD-10-CM

## 2023-01-19 DIAGNOSIS — Z86.79 STATUS POST ENDOVASCULAR ANEURYSM REPAIR (EVAR): Primary | ICD-10-CM

## 2023-01-19 DIAGNOSIS — I71.40 ABDOMINAL AORTIC ANEURYSM (AAA) GREATER THAN 5.5 CM IN DIAMETER IN MALE: ICD-10-CM

## 2023-01-19 DIAGNOSIS — Z09 POSTOP CHECK: Primary | ICD-10-CM

## 2023-01-19 PROCEDURE — 99024 POSTOP FOLLOW-UP VISIT: CPT | Performed by: STUDENT IN AN ORGANIZED HEALTH CARE EDUCATION/TRAINING PROGRAM

## 2023-01-19 RX ORDER — TIZANIDINE 2 MG/1
TABLET ORAL
Qty: 30 TABLET | Refills: 1 | Status: SHIPPED | OUTPATIENT
Start: 2023-01-19

## 2023-01-19 RX ORDER — METOPROLOL SUCCINATE 25 MG/1
25 TABLET, EXTENDED RELEASE ORAL DAILY
Qty: 30 TABLET | Refills: 3 | Status: SHIPPED | OUTPATIENT
Start: 2023-01-19

## 2023-01-19 NOTE — PROGRESS NOTES
Matteo Romo Sr (:  1950) is a 81 y.o. male,Established patient, here for evaluation of the following chief complaint(s):  Post-Op Check         ASSESSMENT/PLAN:  1. Postop check    This is a 81year old male patient s/p EVAR and femoral EA. Will plan for surveillance CTA provided renal function stable at one month post op. Discussed with patient and family. All questions answered. Subjective   SUBJECTIVE/OBJECTIVE:  This is a 81year old male patient who is s/p EVAR with coiling of accessory right renal artery and right common femoral endarterectomy. No acute issues. He has some numbness along his proximal medial thigh. No abdominal or back pain. He is taking a stool softener. He is still trying to get his BP under control. Objective   Physical Exam  Constitutional:       Appearance: Normal appearance. Cardiovascular:      Rate and Rhythm: Normal rate and regular rhythm. Pulmonary:      Effort: Pulmonary effort is normal. No respiratory distress. Skin:     General: Skin is warm and dry. Comments: Right groin incision c/d/I, left puncture site c/d/i   Neurological:      Mental Status: He is alert.           Con DO Elizabeth, FACS, FSVS, [address] [facility]

## 2023-01-19 NOTE — TELEPHONE ENCOUNTER
Patient  blood pressure medication has been change from lisinopril to amlodipine  at hospital on 01/04/2023 . Patient blood pressure  is running high since then today it was 140 /90 . No symptoms . Patient stat is been running on same range on high side .

## 2023-01-22 RX ORDER — AMLODIPINE BESYLATE 5 MG/1
5 TABLET ORAL DAILY
Qty: 30 TABLET | Refills: 3 | Status: SHIPPED | OUTPATIENT
Start: 2023-01-22

## 2023-01-30 ENCOUNTER — TELEPHONE (OUTPATIENT)
Dept: FAMILY MEDICINE CLINIC | Age: 73
End: 2023-01-30

## 2023-01-30 NOTE — TELEPHONE ENCOUNTER
Patient called he has not been taking amlodipine 5 mg, he is only taking metoprolol succinate 25 mg, since coming home from hospital.  He is not sure if he still needs to be on both. He said recent blood pressures, from last week. He has no SOB     141/01  135/91  113/73  147/94  132/96    He is asking should he go back on Amlodipine 5 mg?     Please call, [phone]

## 2023-01-31 RX ORDER — METOPROLOL SUCCINATE 25 MG/1
25 TABLET, EXTENDED RELEASE ORAL DAILY
Qty: 90 TABLET | Refills: 1 | Status: SHIPPED | OUTPATIENT
Start: 2023-01-31

## 2023-01-31 NOTE — TELEPHONE ENCOUNTER
Patient called back and advised to take both. He states he needs a refill on metoprolol succinate 25 mg sent to [facility].  He does not want to use [facility].

## 2023-02-06 RX ORDER — TIZANIDINE 2 MG/1
TABLET ORAL
Qty: 30 TABLET | Refills: 1 | OUTPATIENT
Start: 2023-02-06

## 2023-02-07 ENCOUNTER — HOSPITAL ENCOUNTER (OUTPATIENT)
Age: 73
Discharge: HOME | End: 2023-02-07
Payer: MEDICARE

## 2023-02-07 ENCOUNTER — HOSPITAL ENCOUNTER (INPATIENT)
Age: 73
LOS: 7 days | Discharge: HOME | DRG: 871 | End: 2023-02-14
Attending: EMERGENCY MEDICINE | Admitting: STUDENT IN AN ORGANIZED HEALTH CARE EDUCATION/TRAINING PROGRAM
Payer: MEDICARE

## 2023-02-07 ENCOUNTER — HOSPITAL ENCOUNTER (OUTPATIENT)
Dept: GENERAL RADIOLOGY | Age: 73
Discharge: HOME | End: 2023-02-07
Payer: MEDICARE

## 2023-02-07 ENCOUNTER — OFFICE VISIT (OUTPATIENT)
Dept: FAMILY MEDICINE CLINIC | Age: 73
End: 2023-02-07

## 2023-02-07 ENCOUNTER — HOSPITAL ENCOUNTER (OUTPATIENT)
Dept: CT IMAGING | Age: 73
Discharge: HOME | End: 2023-02-07
Payer: MEDICARE

## 2023-02-07 VITALS
HEIGHT: 74 IN | HEART RATE: 80 BPM | WEIGHT: 189 LBS | TEMPERATURE: 97.3 F | DIASTOLIC BLOOD PRESSURE: 87 MMHG | BODY MASS INDEX: 24.26 KG/M2 | SYSTOLIC BLOOD PRESSURE: 143 MMHG | OXYGEN SATURATION: 98 %

## 2023-02-07 DIAGNOSIS — R79.82 CRP ELEVATED: ICD-10-CM

## 2023-02-07 DIAGNOSIS — R10.32 LLQ PAIN: ICD-10-CM

## 2023-02-07 DIAGNOSIS — R10.32 LLQ PAIN: Primary | ICD-10-CM

## 2023-02-07 DIAGNOSIS — G89.29 CHRONIC LEFT-SIDED LOW BACK PAIN WITH LEFT-SIDED SCIATICA: ICD-10-CM

## 2023-02-07 DIAGNOSIS — R39.15 BENIGN PROSTATIC HYPERPLASIA (BPH) WITH URINARY URGENCY: ICD-10-CM

## 2023-02-07 DIAGNOSIS — I71.33 RUPTURED INFRARENAL ABDOMINAL AORTIC ANEURYSM (AAA): ICD-10-CM

## 2023-02-07 DIAGNOSIS — K76.9 LIVER LESION: ICD-10-CM

## 2023-02-07 DIAGNOSIS — M54.42 CHRONIC LEFT-SIDED LOW BACK PAIN WITH LEFT-SIDED SCIATICA: ICD-10-CM

## 2023-02-07 DIAGNOSIS — I10 ESSENTIAL HYPERTENSION: ICD-10-CM

## 2023-02-07 DIAGNOSIS — D72.829 LEUKOCYTOSIS, UNSPECIFIED TYPE: ICD-10-CM

## 2023-02-07 DIAGNOSIS — N40.1 BENIGN PROSTATIC HYPERPLASIA (BPH) WITH URINARY URGENCY: ICD-10-CM

## 2023-02-07 DIAGNOSIS — K68.12 PSOAS ABSCESS, LEFT (HCC): Primary | ICD-10-CM

## 2023-02-07 PROBLEM — R10.9 ABDOMINAL PAIN: Status: ACTIVE | Noted: 2023-02-07

## 2023-02-07 LAB
ANION GAP SERPL CALCULATED.3IONS-SCNC: 13 MMOL/L (ref 3–16)
BACTERIA: ABNORMAL /HPF
BASOPHILS ABSOLUTE: 0.1 K/UL (ref 0–0.2)
BASOPHILS RELATIVE PERCENT: 0.5 %
BILIRUBIN URINE: NEGATIVE
BILIRUBIN, POC: NEGATIVE
BLOOD URINE, POC: NORMAL
BLOOD, URINE: ABNORMAL
BUN BLDV-MCNC: 17 MG/DL (ref 7–20)
C-REACTIVE PROTEIN: 70 MG/L (ref 0–5.1)
CALCIUM SERPL-MCNC: 9.7 MG/DL (ref 8.3–10.6)
CHLORIDE BLD-SCNC: 96 MMOL/L (ref 99–110)
CLARITY, POC: NORMAL
CLARITY: CLEAR
CO2: 24 MMOL/L (ref 21–32)
COLOR, POC: YELLOW
COLOR: YELLOW
CREAT SERPL-MCNC: 1.1 MG/DL (ref 0.8–1.3)
EOSINOPHILS ABSOLUTE: 0.5 K/UL (ref 0–0.6)
EOSINOPHILS RELATIVE PERCENT: 3.9 %
EPITHELIAL CELLS, UA: 1 /HPF (ref 0–5)
GFR SERPL CREATININE-BSD FRML MDRD: >60 ML/MIN/{1.73_M2}
GLUCOSE BLD-MCNC: 101 MG/DL (ref 70–99)
GLUCOSE URINE, POC: NEGATIVE
GLUCOSE URINE: NEGATIVE MG/DL
HCT VFR BLD CALC: 37.4 % (ref 40.5–52.5)
HEMOGLOBIN: 12.3 G/DL (ref 13.5–17.5)
HYALINE CASTS: 0 /LPF (ref 0–8)
KETONES, POC: NEGATIVE
KETONES, URINE: NEGATIVE MG/DL
LACTIC ACID: 1 MMOL/L (ref 0.4–2)
LACTIC ACID: 1.4 MMOL/L (ref 0.4–2)
LEUKOCYTE EST, POC: NEGATIVE
LEUKOCYTE ESTERASE, URINE: NEGATIVE
LYMPHOCYTES ABSOLUTE: 2.3 K/UL (ref 1–5.1)
LYMPHOCYTES RELATIVE PERCENT: 19 %
MCH RBC QN AUTO: 26.5 PG (ref 26–34)
MCHC RBC AUTO-ENTMCNC: 33 G/DL (ref 31–36)
MCV RBC AUTO: 80.3 FL (ref 80–100)
MICROSCOPIC EXAMINATION: YES
MONOCYTES ABSOLUTE: 1.2 K/UL (ref 0–1.3)
MONOCYTES RELATIVE PERCENT: 10 %
NEUTROPHILS ABSOLUTE: 7.9 K/UL (ref 1.7–7.7)
NEUTROPHILS RELATIVE PERCENT: 66.6 %
NITRITE, POC: NEGATIVE
NITRITE, URINE: NEGATIVE
PDW BLD-RTO: 16.3 % (ref 12.4–15.4)
PH UA: 5 (ref 5–8)
PH, POC: 6
PLATELET # BLD: 349 K/UL (ref 135–450)
PMV BLD AUTO: 8.1 FL (ref 5–10.5)
POTASSIUM REFLEX MAGNESIUM: 3.6 MMOL/L (ref 3.5–5.1)
PROCALCITONIN: 0.06 NG/ML (ref 0–0.15)
PROTEIN UA: 30 MG/DL
PROTEIN, POC: NORMAL
RBC # BLD: 4.66 M/UL (ref 4.2–5.9)
RBC UA: 8 /HPF (ref 0–4)
SODIUM BLD-SCNC: 133 MMOL/L (ref 136–145)
SPECIFIC GRAVITY UA: 1.09 (ref 1–1.03)
SPECIFIC GRAVITY, POC: <=1.005
URINE REFLEX TO CULTURE: ABNORMAL
URINE TYPE: ABNORMAL
UROBILINOGEN, POC: NORMAL
UROBILINOGEN, URINE: 1 E.U./DL
WBC # BLD: 11.9 K/UL (ref 4–11)
WBC UA: 1 /HPF (ref 0–5)

## 2023-02-07 PROCEDURE — 6370000000 HC RX 637 (ALT 250 FOR IP): Performed by: STUDENT IN AN ORGANIZED HEALTH CARE EDUCATION/TRAINING PROGRAM

## 2023-02-07 PROCEDURE — 6360000004 HC RX CONTRAST MEDICATION: Performed by: INTERNAL MEDICINE

## 2023-02-07 PROCEDURE — 85025 COMPLETE CBC W/AUTO DIFF WBC: CPT

## 2023-02-07 PROCEDURE — 86140 C-REACTIVE PROTEIN: CPT

## 2023-02-07 PROCEDURE — 72100 X-RAY EXAM L-S SPINE 2/3 VWS: CPT

## 2023-02-07 PROCEDURE — 96374 THER/PROPH/DIAG INJ IV PUSH: CPT

## 2023-02-07 PROCEDURE — 36415 COLL VENOUS BLD VENIPUNCTURE: CPT

## 2023-02-07 PROCEDURE — 84145 PROCALCITONIN (PCT): CPT

## 2023-02-07 PROCEDURE — 83605 ASSAY OF LACTIC ACID: CPT

## 2023-02-07 PROCEDURE — 2580000003 HC RX 258: Performed by: EMERGENCY MEDICINE

## 2023-02-07 PROCEDURE — 2580000003 HC RX 258: Performed by: STUDENT IN AN ORGANIZED HEALTH CARE EDUCATION/TRAINING PROGRAM

## 2023-02-07 PROCEDURE — 96375 TX/PRO/DX INJ NEW DRUG ADDON: CPT

## 2023-02-07 PROCEDURE — 74174 CTA ABD&PLVS W/CONTRAST: CPT

## 2023-02-07 PROCEDURE — 1200000000 HC SEMI PRIVATE

## 2023-02-07 PROCEDURE — 6360000002 HC RX W HCPCS: Performed by: EMERGENCY MEDICINE

## 2023-02-07 PROCEDURE — 80048 BASIC METABOLIC PNL TOTAL CA: CPT

## 2023-02-07 PROCEDURE — 81001 URINALYSIS AUTO W/SCOPE: CPT

## 2023-02-07 PROCEDURE — 87040 BLOOD CULTURE FOR BACTERIA: CPT

## 2023-02-07 PROCEDURE — 99285 EMERGENCY DEPT VISIT HI MDM: CPT

## 2023-02-07 RX ORDER — ACETAMINOPHEN 325 MG/1
650 TABLET ORAL EVERY 6 HOURS PRN
Status: DISCONTINUED | OUTPATIENT
Start: 2023-02-07 | End: 2023-02-14 | Stop reason: HOSPADM

## 2023-02-07 RX ORDER — ENOXAPARIN SODIUM 100 MG/ML
40 INJECTION SUBCUTANEOUS DAILY
Status: DISCONTINUED | OUTPATIENT
Start: 2023-02-08 | End: 2023-02-14 | Stop reason: HOSPADM

## 2023-02-07 RX ORDER — CYCLOBENZAPRINE HCL 10 MG
5 TABLET ORAL ONCE
Status: COMPLETED | OUTPATIENT
Start: 2023-02-07 | End: 2023-02-07

## 2023-02-07 RX ORDER — MORPHINE SULFATE 2 MG/ML
2 INJECTION, SOLUTION INTRAMUSCULAR; INTRAVENOUS EVERY 4 HOURS PRN
Status: DISCONTINUED | OUTPATIENT
Start: 2023-02-07 | End: 2023-02-14 | Stop reason: HOSPADM

## 2023-02-07 RX ORDER — TAMSULOSIN HYDROCHLORIDE 0.4 MG/1
0.4 CAPSULE ORAL DAILY
Status: DISCONTINUED | OUTPATIENT
Start: 2023-02-08 | End: 2023-02-14 | Stop reason: HOSPADM

## 2023-02-07 RX ORDER — MORPHINE SULFATE 4 MG/ML
4 INJECTION, SOLUTION INTRAMUSCULAR; INTRAVENOUS EVERY 4 HOURS PRN
Status: DISCONTINUED | OUTPATIENT
Start: 2023-02-07 | End: 2023-02-14 | Stop reason: HOSPADM

## 2023-02-07 RX ORDER — AMLODIPINE BESYLATE 5 MG/1
5 TABLET ORAL DAILY
Status: DISCONTINUED | OUTPATIENT
Start: 2023-02-08 | End: 2023-02-14 | Stop reason: HOSPADM

## 2023-02-07 RX ORDER — LISINOPRIL 20 MG/1
20 TABLET ORAL DAILY
COMMUNITY
End: 2023-02-22 | Stop reason: DRUGHIGH

## 2023-02-07 RX ORDER — 0.9 % SODIUM CHLORIDE 0.9 %
500 INTRAVENOUS SOLUTION INTRAVENOUS ONCE
Status: COMPLETED | OUTPATIENT
Start: 2023-02-07 | End: 2023-02-07

## 2023-02-07 RX ORDER — FENTANYL CITRATE 50 UG/ML
25 INJECTION, SOLUTION INTRAMUSCULAR; INTRAVENOUS ONCE
Status: COMPLETED | OUTPATIENT
Start: 2023-02-07 | End: 2023-02-07

## 2023-02-07 RX ORDER — SODIUM CHLORIDE 9 MG/ML
INJECTION, SOLUTION INTRAVENOUS CONTINUOUS
Status: ACTIVE | OUTPATIENT
Start: 2023-02-07 | End: 2023-02-08

## 2023-02-07 RX ORDER — GUAIFENESIN/DEXTROMETHORPHAN 100-10MG/5
5 SYRUP ORAL EVERY 4 HOURS PRN
Status: DISCONTINUED | OUTPATIENT
Start: 2023-02-07 | End: 2023-02-14 | Stop reason: HOSPADM

## 2023-02-07 RX ORDER — ATORVASTATIN CALCIUM 20 MG/1
20 TABLET, FILM COATED ORAL DAILY
Status: DISCONTINUED | OUTPATIENT
Start: 2023-02-08 | End: 2023-02-14 | Stop reason: HOSPADM

## 2023-02-07 RX ORDER — SODIUM CHLORIDE 0.9 % (FLUSH) 0.9 %
5-40 SYRINGE (ML) INJECTION EVERY 12 HOURS SCHEDULED
Status: DISCONTINUED | OUTPATIENT
Start: 2023-02-07 | End: 2023-02-14 | Stop reason: HOSPADM

## 2023-02-07 RX ORDER — SODIUM CHLORIDE 9 MG/ML
INJECTION, SOLUTION INTRAVENOUS PRN
Status: DISCONTINUED | OUTPATIENT
Start: 2023-02-07 | End: 2023-02-14 | Stop reason: HOSPADM

## 2023-02-07 RX ORDER — ONDANSETRON 2 MG/ML
4 INJECTION INTRAMUSCULAR; INTRAVENOUS ONCE
Status: COMPLETED | OUTPATIENT
Start: 2023-02-07 | End: 2023-02-07

## 2023-02-07 RX ORDER — METOPROLOL SUCCINATE 25 MG/1
25 TABLET, EXTENDED RELEASE ORAL DAILY
Status: DISCONTINUED | OUTPATIENT
Start: 2023-02-08 | End: 2023-02-14 | Stop reason: HOSPADM

## 2023-02-07 RX ORDER — ACETAMINOPHEN 650 MG/1
650 SUPPOSITORY RECTAL EVERY 6 HOURS PRN
Status: DISCONTINUED | OUTPATIENT
Start: 2023-02-07 | End: 2023-02-14 | Stop reason: HOSPADM

## 2023-02-07 RX ORDER — SODIUM CHLORIDE 0.9 % (FLUSH) 0.9 %
5-40 SYRINGE (ML) INJECTION PRN
Status: DISCONTINUED | OUTPATIENT
Start: 2023-02-07 | End: 2023-02-14 | Stop reason: HOSPADM

## 2023-02-07 RX ADMIN — SODIUM CHLORIDE 500 ML: 9 INJECTION, SOLUTION INTRAVENOUS at 19:31

## 2023-02-07 RX ADMIN — CYCLOBENZAPRINE 5 MG: 10 TABLET, FILM COATED ORAL at 23:49

## 2023-02-07 RX ADMIN — IOPAMIDOL 100 ML: 755 INJECTION, SOLUTION INTRAVENOUS at 12:46

## 2023-02-07 RX ADMIN — ONDANSETRON 4 MG: 2 INJECTION INTRAMUSCULAR; INTRAVENOUS at 19:33

## 2023-02-07 RX ADMIN — CEFEPIME 2000 MG: 2 INJECTION, POWDER, FOR SOLUTION INTRAVENOUS at 21:14

## 2023-02-07 RX ADMIN — SODIUM CHLORIDE: 9 INJECTION, SOLUTION INTRAVENOUS at 23:53

## 2023-02-07 RX ADMIN — VANCOMYCIN HYDROCHLORIDE 1750 MG: 1 INJECTION, POWDER, LYOPHILIZED, FOR SOLUTION INTRAVENOUS at 21:48

## 2023-02-07 RX ADMIN — FENTANYL CITRATE 25 MCG: 50 INJECTION INTRAMUSCULAR; INTRAVENOUS at 19:32

## 2023-02-07 SDOH — ECONOMIC STABILITY: FOOD INSECURITY: WITHIN THE PAST 12 MONTHS, YOU WORRIED THAT YOUR FOOD WOULD RUN OUT BEFORE YOU GOT MONEY TO BUY MORE.: NEVER TRUE

## 2023-02-07 SDOH — ECONOMIC STABILITY: FOOD INSECURITY: WITHIN THE PAST 12 MONTHS, THE FOOD YOU BOUGHT JUST DIDN'T LAST AND YOU DIDN'T HAVE MONEY TO GET MORE.: NEVER TRUE

## 2023-02-07 SDOH — ECONOMIC STABILITY: HOUSING INSECURITY
IN THE LAST 12 MONTHS, WAS THERE A TIME WHEN YOU DID NOT HAVE A STEADY PLACE TO SLEEP OR SLEPT IN A SHELTER (INCLUDING NOW)?: NO

## 2023-02-07 SDOH — ECONOMIC STABILITY: INCOME INSECURITY: HOW HARD IS IT FOR YOU TO PAY FOR THE VERY BASICS LIKE FOOD, HOUSING, MEDICAL CARE, AND HEATING?: NOT HARD AT ALL

## 2023-02-07 ASSESSMENT — ENCOUNTER SYMPTOMS
BLOOD IN STOOL: 0
DIARRHEA: 0
CONSTIPATION: 1
ABDOMINAL PAIN: 1
BACK PAIN: 1
ABDOMINAL PAIN: 1
VOMITING: 0
NAUSEA: 0
NAUSEA: 0
COUGH: 0
SHORTNESS OF BREATH: 0
RHINORRHEA: 0
SORE THROAT: 0
VOMITING: 0
EYE REDNESS: 0
SHORTNESS OF BREATH: 0

## 2023-02-07 ASSESSMENT — PAIN SCALES - GENERAL
PAINLEVEL_OUTOF10: 8
PAINLEVEL_OUTOF10: 8

## 2023-02-07 ASSESSMENT — PAIN DESCRIPTION - LOCATION: LOCATION: BACK

## 2023-02-07 NOTE — PROGRESS NOTES
[phone] call with results     SUBJECTIVE:  Seferino Dang is a 81 y.o. male being evaluated for:    Chief Complaint   Patient presents with    Hypertension      Patient is here for 4 weeks  blood pressure followup today . Back Pain      Patient is having back pain . HPI   Doing horrible  SO much pain Pain in his lower  stomach and his right groin at surgical incision  site and pain like hell in his left low back  A couple times it goes down his back  Having a lot of constipation  Using miralax No problems before surgery   NO fevers or chills   No trouble urinating but dark yellow  Drinking a lot of water  Cannot eat much  full with small amounts  Down 12 #  Called vascular surgeon and has ordered a CTA was feeling fine when seen there       Allergies   Allergen Reactions    Morphine      Family reports hx of intolerance--patient himself has not had. Family requests we add to profile. Patient has received medication-no complications noted. No current facility-administered medications for this visit. No current outpatient medications on file.      Facility-Administered Medications Ordered in Other Visits   Medication Dose Route Frequency Provider Last Rate Last Admin    vancomycin (VANCOCIN) 1,500 mg in sodium chloride 0.9 % 250 mL IVPB (ADDAVIAL)  1,500 mg IntraVENous Q18H Lewis Jolly MD   Stopped at 02/11/23 0238    metronidazole (FLAGYL) 500 mg in 0.9% NaCl 100 mL IVPB premix  500 mg IntraVENous Christophe Maloney MD   Stopped at 02/11/23 1144    vancomycin (VANCOCIN) intermittent dosing (placeholder)   Other RX Placeholder Jay Del Ora, DO        atorvastatin (LIPITOR) tablet 20 mg  20 mg Oral Daily Jay  Kalli DO   20 mg at 02/11/23 1028    metoprolol succinate (TOPROL XL) extended release tablet 25 mg  25 mg Oral Daily Jay  Kalli, DO   25 mg at 02/11/23 1028    tamsulosin (FLOMAX) capsule 0.4 mg  0.4 mg Oral Daily Jay  Kalli DO   0.4 mg at 02/11/23 1028    amLODIPine (NORVASC) tablet 5 mg  5 mg Oral Daily [facility], DO   5 mg at 23 1028    sodium chloride flush 0.9 % injection 5-40 mL  5-40 mL IntraVENous 2 times per day [facility], DO   10 mL at 02/10/23 2032    sodium chloride flush 0.9 % injection 5-40 mL  5-40 mL IntraVENous PRN [facility], DO        0.9 % sodium chloride infusion   IntraVENous PRN [facility], DO   Stopped at 02/10/23 0139    [Held by provider] enoxaparin (LOVENOX) injection 40 mg  40 mg SubCUTAneous Daily [facility], DO        acetaminophen (TYLENOL) tablet 650 mg  650 mg Oral Q6H PRN [facility], DO   650 mg at 23 0251    Or    acetaminophen (TYLENOL) suppository 650 mg  650 mg Rectal Q6H PRN [facility], DO        cefepime (MAXIPIME) 2,000 mg in sodium chloride 0.9 % 50 mL IVPB (mini-bag)  2,000 mg IntraVENous Q8H [facility], DO 12.5 mL/hr at 23 1304 2,000 mg at 23 1304    morphine (PF) injection 2 mg  2 mg IntraVENous Q4H PRN [facility], DO   2 mg at 23 1301    Or    morphine (PF) injection 4 mg  4 mg IntraVENous Q4H PRN [facility], DO   4 mg at 23 0251    guaiFENesin-dextromethorphan (ROBITUSSIN DM) 100-10 MG/5ML syrup 5 mL  5 mL Oral Q4H PRN Jay Antonino Gomez, DO             Social History     Socioeconomic History    Marital status: Single     Spouse name: Not on file    Number of children: Not on file    Years of education: Not on file    Highest education level: Not on file   Occupational History    Not on file   Tobacco Use    Smoking status: Former     Packs/day: 0.25     Years: 38.00     Pack years: 9.50     Types: Cigarettes     Start date:      Quit date: 1/3/2023     Years since quittin.1    Smokeless tobacco: Never    Tobacco comments:     cutting down as high as 3 pk/d to as low . 25    Vaping Use    Vaping Use: Never used   Substance and Sexual Activity    Alcohol use: Yes     Comment: rarely    Drug use: Never    Sexual activity: Not on file   Other Topics Concern    Not on file   Social History Narrative    Not on file     Social Determinants of Health     Financial Resource Strain: Low Risk     Difficulty of Paying Living Expenses: Not hard at all   Food Insecurity: No Food Insecurity    Worried About [address] in the Last Year: Never true    [address] [facility] [address] in the Last Year: Never true   Transportation Needs: No Transportation Needs    Lack of Transportation (Medical): No    Lack of Transportation (Non-Medical): No   Physical Activity: Inactive    Days of Exercise per Week: 7 days    Minutes of Exercise per Session: 0 min   Stress: Not on file   Social Connections: Not on file   Intimate Partner Violence: Not on file   Housing Stability: Unknown    Unable to Pay for Housing in the Last Year: Not on file    Number of [place] in the Last Year: Not on file    Unstable Housing in the Last Year: No      Past Medical History:   Diagnosis Date    Hyperlipidemia     Hypertension     PAD (peripheral artery disease) ([facility] [address]     stenting in left leg      Past Surgical History:   Procedure Laterality Date    ABDOMINAL AORTIC ANEURYSM REPAIR, ENDOVASCULAR N/A 1/4/2023    ENDOVASCULAR ABDOMINAL AORTIC ANEURYSM REPAIR, EMBOLIZATION OF RENAL ARTERY performed by Obinna Porter DO at [address]  2/8/2023    CT VISCERAL PERCUTANEOUS DRAIN 2/8/2023 [facility] CT    FEMORAL ENDARTERECTOMY Right 1/4/2023    RIGHT FEMORAL ENDARTERECTOMY WITH PATCH performed by Obinna Porter DO at [address] [facility] Right     rotator cuff    TOE SURGERY Left        Review of Systems   Constitutional:  Positive for activity change, appetite change (decreased), fatigue and unexpected weight change (lost weight). Negative for chills and fever. Respiratory:  Negative for cough and shortness of breath. Cardiovascular:  Negative for chest pain, palpitations and leg swelling.    Gastrointestinal: Positive for abdominal pain and constipation. Negative for blood in stool, diarrhea, nausea and vomiting. Genitourinary:  Negative for difficulty urinating, dysuria and hematuria. Musculoskeletal:  Positive for back pain and gait problem. Neurological:  Negative for dizziness, light-headedness and headaches. OBJECTIVE:  BP (!) 143/87 (Site: Left Upper Arm, Position: Sitting, Cuff Size: Medium Adult)   Pulse 80   Temp 97.3 °F (36.3 °C) (Temporal)   Ht 6' 2\" (1.88 m)   Wt 189 lb (85.7 kg)   SpO2 98%   BMI 24.27 kg/m²      Body mass index is 24.27 kg/m². Physical Exam  Constitutional:       General: He is in acute distress. Appearance: Normal appearance. HENT:      Head: Normocephalic and atraumatic. Eyes:      Conjunctiva/sclera: Conjunctivae normal.   Cardiovascular:      Rate and Rhythm: Normal rate and regular rhythm. Heart sounds: Normal heart sounds. No murmur heard. No gallop. Pulmonary:      Effort: Pulmonary effort is normal.      Breath sounds: Normal breath sounds. No wheezing. Abdominal:      General: Bowel sounds are increased. There is no distension. Palpations: Abdomen is soft. There is no mass. Tenderness: There is abdominal tenderness (llq) in the left lower quadrant. There is guarding. There is no rebound. Hernia: No hernia is present. Genitourinary:     Comments: Groin scar on right normal   Testicular tenderness left  NO lesions mild    Musculoskeletal:         General: Tenderness (left low back) present. No swelling. Cervical back: Neck supple. Lymphadenopathy:      Cervical: No cervical adenopathy. Skin:     General: Skin is warm and dry. Neurological:      General: No focal deficit present. Mental Status: He is alert. Gait: Gait abnormal.       ASSESSMENT/PLAN:    Kin Olszewski was seen today for hypertension and back pain. Diagnoses and all orders for this visit:    LLQ pain  ?  Diverticular disease constipation  this is how he presented with his rupture though   -     CT ABDOMEN PELVIS W IV CONTRAST Additional Contrast? Radiologist Recommendation; Future  -     POCT Urinalysis no Micro    Ruptured infrarenal abdominal aortic aneurysm (AAA)  -     CTA ABDOMEN PELVIS W WO CONTRAST; Future    Chronic left-sided low back pain with left-sided sciatica  -     XR LUMBAR SPINE (2-3 VIEWS); Future    Essential hypertension up with pain     Liver lesion  Comments:  at sometime when better needs an MRI     Benign prostatic hyperplasia (BPH) with urinary urgency  Comments:  saw urology on flomax and discussed surgery for which he is not ready   Denies problems urinating       No orders of the defined types were placed in this encounter. Return if symptoms worsen or fail to improve, for after determining ct results . There are no Patient Instructions on file for this visit.

## 2023-02-07 NOTE — ED PROVIDER NOTES
[facility]            Pt Name: El Valente   MRN: [id number]   [place] 1950   Date of evaluation: 2/7/2023   Provider: Mayank Bernard MD   PCP: Stephanie Garcia MD   Note Started: 6:37 PM EST 2/7/23          CHIEF COMPLAINT     Chief Complaint   Patient presents with    Abdominal Pain     Pt reports left sided abdominal pain and left sided flank pain for the last week. Pt alert and oriented at this time and rates pain as a 8/10. HISTORY OF PRESENT ILLNESS:   History from : Patient   Limitations to history : None     El Valente is a 81 y.o. male who presents planing of left lower abdominal pain with radiation to his back for the last week. Progressively worsening. The patient states that he underwent endovascular abdominal aortic aneurysm repair, and a right femoral endarterectomy with patch on 3 January of this year. He had been doing well until but a week ago when he started developing some left lower quadrant abdominal pain with progressively worsening. He denies any history of fever but does endorse some chills. He went to see his primary care provider today who performed a CAT scan. He states the CAT scan showed some fluid in his muscle that they were concerned might be an infection. Nursing Notes were all reviewed and agreed with, or any disagreements were addressed in the HPI. REVIEW OF SYSTEMS :    Review of Systems   Constitutional:  Positive for chills. Negative for fever. HENT:  Negative for rhinorrhea and sore throat. Eyes:  Negative for redness. Respiratory:  Negative for shortness of breath. Cardiovascular:  Negative for chest pain. Gastrointestinal:  Positive for abdominal pain. Negative for nausea and vomiting. Genitourinary:  Negative for flank pain. Neurological:  Negative for headaches. Hematological:  Negative for adenopathy. Psychiatric/Behavioral:  Negative for confusion. MEDICAL HISTORY   has a past medical history of Hyperlipidemia, Hypertension, and PAD (peripheral artery disease) ([facility] [address] Past Surgical History:   Procedure Laterality Date    ABDOMINAL AORTIC ANEURYSM REPAIR, ENDOVASCULAR N/A 1/4/2023    ENDOVASCULAR ABDOMINAL AORTIC ANEURYSM REPAIR, EMBOLIZATION OF RENAL ARTERY performed by Krzysztof Lee DO at [address] [facility] Right 1/4/2023    RIGHT FEMORAL ENDARTERECTOMY WITH PATCH performed by Krzysztof Lee DO at [address] [facility] Right     rotator cuff    TOE SURGERY Left       CURRENTMEDICATIONS       Previous Medications    ALBUTEROL SULFATE HFA (VENTOLIN HFA) 108 (90 BASE) MCG/ACT INHALER    Inhale 2 puffs into the lungs 4 times daily as needed for Wheezing    AMLODIPINE (NORVASC) 5 MG TABLET    Take 1 tablet by mouth daily    ATORVASTATIN (LIPITOR) 20 MG TABLET    TAKE 1 TABLET BY MOUTH DAILY    LISINOPRIL (PRINIVIL;ZESTRIL) 20 MG TABLET    Take 20 mg by mouth daily    METOPROLOL SUCCINATE (TOPROL XL) 25 MG EXTENDED RELEASE TABLET    Take 1 tablet by mouth daily    TAMSULOSIN (FLOMAX) 0.4 MG CAPSULE    TAKE 1 CAPSULE BY MOUTH DAILY    TIZANIDINE (ZANAFLEX) 2 MG TABLET    TAKE 1 TABLET BY MOUTH THREE TIMES DAILY AS NEEDED FOR LOWER BACK PAIN    VITAMIN B-12 (CYANOCOBALAMIN) 500 MCG TABLET    Take 1 tablet by mouth daily      SCREENINGS          [place] Coma Scale  Eye Opening: Spontaneous  Best Verbal Response: Oriented  Best Motor Response: Obeys commands  [place] Coma Scale Score: 15                CIWA Assessment  BP: (!) 165/108  Heart Rate: 65                  PHYSICAL EXAM :  ED Triage Vitals   BP Temp Temp src Pulse Resp SpO2 Height Weight   -- -- -- -- -- -- -- --        Physical Exam  Vitals and nursing note reviewed. Constitutional:       Appearance: He is well-developed. He is not diaphoretic. HENT:      Head: Normocephalic and atraumatic. Mouth/Throat:      Mouth: Mucous membranes are moist.   Eyes:      General:         Right eye: No discharge. Left eye: No discharge. Conjunctiva/sclera: Conjunctivae normal.   Cardiovascular:      Rate and Rhythm: Normal rate. Pulses: Normal pulses. Heart sounds: No murmur heard. Pulmonary:      Effort: Pulmonary effort is normal. No respiratory distress. Abdominal:      Palpations: Abdomen is soft. Tenderness: There is abdominal tenderness. There is no guarding or rebound. Comments: Left lower quadrant abdominal pain and left inguinal tenderness. No rebound or involuntary guarding. Musculoskeletal:         General: Normal range of motion. Cervical back: Neck supple. Skin:     General: Skin is warm and dry. Capillary Refill: Capillary refill takes less than 2 seconds. Neurological:      Mental Status: He is alert and oriented to person, place, and time.    Psychiatric:         Behavior: Behavior normal.     ___    DIAGNOSTIC RESULTS     LABS:   Labs Reviewed   CBC WITH AUTO DIFFERENTIAL - Abnormal; Notable for the following components:       Result Value    WBC 11.9 (*)     Hemoglobin 12.3 (*)     Hematocrit 37.4 (*)     RDW 16.3 (*)     Neutrophils Absolute 7.9 (*)     All other components within normal limits   BASIC METABOLIC PANEL W/ REFLEX TO MG FOR LOW K - Abnormal; Notable for the following components:    Sodium 133 (*)     Chloride 96 (*)     Glucose 101 (*)     All other components within normal limits   C-REACTIVE PROTEIN - Abnormal; Notable for the following components:    CRP 70.0 (*)     All other components within normal limits   URINALYSIS WITH REFLEX TO CULTURE - Abnormal; Notable for the following components:    Blood, Urine LARGE (*)     Protein, UA 30 (*)     All other components within normal limits   MICROSCOPIC URINALYSIS - Abnormal; Notable for the following components:    RBC, UA 8 (*)     All other components within normal limits   CULTURE, BLOOD 1   CULTURE, BLOOD 2   LACTIC ACID   LACTIC ACID   PROCALCITONIN      When ordered only abnormal lab results are displayed. All other labs were within normal range or not returned as of this dictation. RADIOLOGY:      Non-plain film images such as CT, Ultrasound and MRI are read by the radiologist. Plain radiographic images are visualized and preliminarily interpreted by the ED Provider with the below findings:   Interpretation per the Radiologist below, if available at the time of this note:     No orders to display      CTA [address]    Result Date: 2/7/2023  EXAMINATION: CTA OF THE ABDOMEN AND PELVIS WITH AND WITHOUT CONTRAST 2/7/2023 12:47 pm: TECHNIQUE: CTA of the abdomen and pelvis was performed without and with the administration of intravenous contrast. Multiplanar reformatted images are provided for review. MIP images are provided for review. Automated exposure control, iterative reconstruction, and/or weight based adjustment of the mA/kV was utilized to reduce the radiation dose to as low as reasonably achievable. 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 Ectatic ascending aorta measuring 4.6 cm. This remains stable. Endovascular repair of the abdominal aortic aneurysm is noted. The endograft is patent with no evidence of endoleaks. Largest AP diameter of the abdominal aorta is 5.5 cm down from 6.1 cm previously.   Mesenteric and renal circulation is patent although there is bilateral renal artery disease. There appears to be possible infarct in the right kidney. The liver, gallbladder, pancreas and spleen, adrenals and IVC appear stable. Gastrointestinal tract demonstrates diverticulosis but no acute diverticulitis. No evidence of retroperitoneal lymphadenopathy. A left psoas fluid collection can be seen measuring 3.3 x 2.8 cm which previously measured 1.7 x 1.4 cm. This collection abuts the abdominal aorta and there is concern that this may represent a left psoas abscess. If indeed this is a psoas abscess, there is risk of infecting the graft. CTA PELVIS: Bilateral iliac limbs of the reconstruction are patent. The iliac and femoral runoff appears unremarkable. Mild seroma in the right groin. Urinary bladder, prostate and seminal vesicles appear stable. 1. Successful endovascular repair of the abdominal aortic aneurysm. AP diameter of the abdominal aorta is currently down to 5.5 cm from 6.1 cm previously. 2. No evidence of endoleaks. 3. Left psoas collection measures 3.3 x 2.8 cm up from 1.4 x 1.7 cm previously. This collection abuts the aorta and there is concern for possible infection. 4. 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 5. Diverticulosis but no acute diverticulitis. RECOMMENDATIONS: 1. Aspiration of the left psoas fluid collection to rule out abscess. 2. For management of fusiform AAA: Note: Recommend Vascular consultation if a fusiform AAA enlarges by >0.5 cm in 6 months or >1 cm in 1 year or for a saccular AAA of any size. References: Helyn Cough Radiol 2013; 26(87):256-118; J Vasc Surg.  2018; 67:2-77           EKG:     PROCEDURES   Unless otherwise noted below, none     CRITICAL CARE TIME   30 min         Vitals:    Vitals:    02/07/23 2045 02/07/23 2100 02/07/23 2115 02/07/23 2150   BP: (!) 162/84 (!) 165/96 (!) 160/122 (!) 165/108   Pulse: 63 62 58 65 Resp: 21 14 17 19   Temp:    98.5 °F (36.9 °C)   SpO2: 99% 99% 100% 98%             Is this patient to be included in the SEP-1 Core Measure due to severe sepsis or septic shock? No   Exclusion criteria - the patient is NOT to be included for SEP-1 Core Measure due to:  2+ SIRS criteria are not met       CC/HPI Summary, DDx, ED Course, and Reassessment:     DDX: Psoas abscess, Psoas seroma, Sepsis, Bacteremia, other    This patient has several episodes in the ED where he will discharge shaking making me concerned about bacteremia. His CRP is 70. His BMP shows a sodium of 133, glucose 101, chloride 96. His lactic acid is less than 2. Urinalysis without obvious infection. His white blood cell count is 11.9. He has some mild anemia with a hemoglobin of 12.3 and hematocrit of 37. Cloteal Malik His CT scan was done earlier today in the findings are above. Given the patient's chills I am concerned about the patient having a psoas abscess/infection. Also concerned about bacteremia. I spoke with vascular surgery and they plan to let Dr. Shelly Olivera know that the patient is here. I spoke to the hospitalist to get recommendations for antibiotics and they recommended a procalcitonin and gave antibiotic recommendations which were followed. Currently the patient does not meet sepsis criteria however we did send blood cultures off in case the patient was to worsen.        Patient was given the following medications:   Medications   vancomycin (VANCOCIN) 1,750 mg in sodium chloride 0.9 % 500 mL IVPB (1,750 mg IntraVENous New Bag 2/7/23 2148)   ondansetron (ZOFRAN) injection 4 mg (4 mg IntraVENous Given 2/7/23 1933)   fentaNYL (SUBLIMAZE) injection 25 mcg (25 mcg IntraVENous Given 2/7/23 1932)   0.9 % sodium chloride bolus (0 mLs IntraVENous Stopped 2/7/23 2150)   cefepime (MAXIPIME) 2,000 mg in dextrose 5 % 50 mL IVPB (0 mg IntraVENous Stopped 2/7/23 2149)        CONSULTS: (Who and What was discussed)  IP CONSULT TO VASCULAR SURGERY  IP CONSULT TO VASCULAR SURGERY  PHARMACY TO DOSE VANCOMYCIN  IP CONSULT TO INFECTIOUS DISEASES    Discussion with Other Profesionals : Vascular surgery and the hospitalist    Social Determinants : None    Records Reviewed : None    Chronic Conditions:   has a past medical history of Hyperlipidemia, Hypertension, and PAD (peripheral artery disease) ([facility] [address] Disposition Considerations:    I am the Primary Clinician of Record. FINAL IMPRESSION    1. Psoas abscess, left (HCC)    2. Leukocytosis, unspecified type    3. CRP elevated           DISPOSITION/PLAN     PATIENT REFERRED TO:   No follow-up provider specified.      DISCHARGE MEDICATIONS:   New Prescriptions    No medications on file        DISCONTINUED MEDICATIONS:   Discontinued Medications    No medications on file              (Please note that portions of this note were completed with a voice recognition program.  Efforts were made to edit the dictations but occasionally words are mis-transcribed.)       Brenda Walton MD (electronically signed)              Brenda Walton MD  02/07/23 2416

## 2023-02-08 ENCOUNTER — APPOINTMENT (OUTPATIENT)
Dept: CT IMAGING | Age: 73
DRG: 871 | End: 2023-02-08
Payer: MEDICARE

## 2023-02-08 ENCOUNTER — TELEPHONE (OUTPATIENT)
Dept: VASCULAR SURGERY | Age: 73
End: 2023-02-08

## 2023-02-08 LAB
ALBUMIN SERPL-MCNC: 3.2 G/DL (ref 3.4–5)
ANION GAP SERPL CALCULATED.3IONS-SCNC: 11 MMOL/L (ref 3–16)
BASOPHILS ABSOLUTE: 0.1 K/UL (ref 0–0.2)
BASOPHILS RELATIVE PERCENT: 0.9 %
BUN BLDV-MCNC: 14 MG/DL (ref 7–20)
CALCIUM SERPL-MCNC: 9.2 MG/DL (ref 8.3–10.6)
CHLORIDE BLD-SCNC: 102 MMOL/L (ref 99–110)
CO2: 24 MMOL/L (ref 21–32)
CREAT SERPL-MCNC: 1 MG/DL (ref 0.8–1.3)
EOSINOPHILS ABSOLUTE: 0.5 K/UL (ref 0–0.6)
EOSINOPHILS RELATIVE PERCENT: 4.9 %
GFR SERPL CREATININE-BSD FRML MDRD: >60 ML/MIN/{1.73_M2}
GLUCOSE BLD-MCNC: 93 MG/DL (ref 70–99)
HCT VFR BLD CALC: 33.4 % (ref 40.5–52.5)
HEMOGLOBIN: 11.4 G/DL (ref 13.5–17.5)
LYMPHOCYTES ABSOLUTE: 2 K/UL (ref 1–5.1)
LYMPHOCYTES RELATIVE PERCENT: 19.1 %
MAGNESIUM: 2 MG/DL (ref 1.8–2.4)
MCH RBC QN AUTO: 27.3 PG (ref 26–34)
MCHC RBC AUTO-ENTMCNC: 34.3 G/DL (ref 31–36)
MCV RBC AUTO: 79.7 FL (ref 80–100)
MONOCYTES ABSOLUTE: 1.3 K/UL (ref 0–1.3)
MONOCYTES RELATIVE PERCENT: 11.8 %
NEUTROPHILS ABSOLUTE: 6.7 K/UL (ref 1.7–7.7)
NEUTROPHILS RELATIVE PERCENT: 63.3 %
PDW BLD-RTO: 16 % (ref 12.4–15.4)
PHOSPHORUS: 3 MG/DL (ref 2.5–4.9)
PLATELET # BLD: 310 K/UL (ref 135–450)
PMV BLD AUTO: 8 FL (ref 5–10.5)
POTASSIUM SERPL-SCNC: 3.9 MMOL/L (ref 3.5–5.1)
RBC # BLD: 4.19 M/UL (ref 4.2–5.9)
SODIUM BLD-SCNC: 137 MMOL/L (ref 136–145)
WBC # BLD: 10.6 K/UL (ref 4–11)

## 2023-02-08 PROCEDURE — 6370000000 HC RX 637 (ALT 250 FOR IP): Performed by: STUDENT IN AN ORGANIZED HEALTH CARE EDUCATION/TRAINING PROGRAM

## 2023-02-08 PROCEDURE — 99024 POSTOP FOLLOW-UP VISIT: CPT | Performed by: STUDENT IN AN ORGANIZED HEALTH CARE EDUCATION/TRAINING PROGRAM

## 2023-02-08 PROCEDURE — 77012 CT SCAN FOR NEEDLE BIOPSY: CPT

## 2023-02-08 PROCEDURE — 6360000002 HC RX W HCPCS: Performed by: STUDENT IN AN ORGANIZED HEALTH CARE EDUCATION/TRAINING PROGRAM

## 2023-02-08 PROCEDURE — 6360000002 HC RX W HCPCS: Performed by: RADIOLOGY

## 2023-02-08 PROCEDURE — 87205 SMEAR GRAM STAIN: CPT

## 2023-02-08 PROCEDURE — 2580000003 HC RX 258: Performed by: STUDENT IN AN ORGANIZED HEALTH CARE EDUCATION/TRAINING PROGRAM

## 2023-02-08 PROCEDURE — APPNB30 APP NON BILLABLE TIME 0-30 MINS: Performed by: NURSE PRACTITIONER

## 2023-02-08 PROCEDURE — 87075 CULTR BACTERIA EXCEPT BLOOD: CPT

## 2023-02-08 PROCEDURE — 85610 PROTHROMBIN TIME: CPT

## 2023-02-08 PROCEDURE — 83735 ASSAY OF MAGNESIUM: CPT

## 2023-02-08 PROCEDURE — 80069 RENAL FUNCTION PANEL: CPT

## 2023-02-08 PROCEDURE — 1200000000 HC SEMI PRIVATE

## 2023-02-08 PROCEDURE — 87070 CULTURE OTHR SPECIMN AEROBIC: CPT

## 2023-02-08 PROCEDURE — 49405 IMAGE CATH FLUID COLXN VISC: CPT

## 2023-02-08 PROCEDURE — 85025 COMPLETE CBC W/AUTO DIFF WBC: CPT

## 2023-02-08 PROCEDURE — 36415 COLL VENOUS BLD VENIPUNCTURE: CPT

## 2023-02-08 RX ORDER — FENTANYL CITRATE 50 UG/ML
INJECTION, SOLUTION INTRAMUSCULAR; INTRAVENOUS DAILY PRN
Status: COMPLETED | OUTPATIENT
Start: 2023-02-08 | End: 2023-02-08

## 2023-02-08 RX ORDER — MIDAZOLAM HYDROCHLORIDE 1 MG/ML
INJECTION INTRAMUSCULAR; INTRAVENOUS DAILY PRN
Status: COMPLETED | OUTPATIENT
Start: 2023-02-08 | End: 2023-02-08

## 2023-02-08 RX ORDER — VANCOMYCIN 1.75 G/350ML
1250 INJECTION, SOLUTION INTRAVENOUS
Status: DISCONTINUED | OUTPATIENT
Start: 2023-02-08 | End: 2023-02-09

## 2023-02-08 RX ADMIN — CEFEPIME 2000 MG: 2 INJECTION, POWDER, FOR SOLUTION INTRAVENOUS at 13:39

## 2023-02-08 RX ADMIN — TAMSULOSIN HYDROCHLORIDE 0.4 MG: 0.4 CAPSULE ORAL at 08:33

## 2023-02-08 RX ADMIN — METOPROLOL SUCCINATE 25 MG: 25 TABLET, EXTENDED RELEASE ORAL at 08:33

## 2023-02-08 RX ADMIN — ATORVASTATIN CALCIUM 20 MG: 20 TABLET, FILM COATED ORAL at 08:33

## 2023-02-08 RX ADMIN — MORPHINE SULFATE 4 MG: 4 INJECTION, SOLUTION INTRAMUSCULAR; INTRAVENOUS at 06:08

## 2023-02-08 RX ADMIN — VANCOMYCIN 1250 MG: 1.75 INJECTION, SOLUTION INTRAVENOUS at 17:24

## 2023-02-08 RX ADMIN — MORPHINE SULFATE 4 MG: 4 INJECTION, SOLUTION INTRAMUSCULAR; INTRAVENOUS at 23:22

## 2023-02-08 RX ADMIN — CEFEPIME 2000 MG: 2 INJECTION, POWDER, FOR SOLUTION INTRAVENOUS at 21:22

## 2023-02-08 RX ADMIN — CEFEPIME 2000 MG: 2 INJECTION, POWDER, FOR SOLUTION INTRAVENOUS at 06:05

## 2023-02-08 RX ADMIN — MIDAZOLAM 1 MG: 1 INJECTION INTRAMUSCULAR; INTRAVENOUS at 15:51

## 2023-02-08 RX ADMIN — SODIUM CHLORIDE: 9 INJECTION, SOLUTION INTRAVENOUS at 10:31

## 2023-02-08 RX ADMIN — AMLODIPINE BESYLATE 5 MG: 5 TABLET ORAL at 08:33

## 2023-02-08 RX ADMIN — MORPHINE SULFATE 4 MG: 4 INJECTION, SOLUTION INTRAMUSCULAR; INTRAVENOUS at 10:31

## 2023-02-08 RX ADMIN — Medication 10 ML: at 10:33

## 2023-02-08 RX ADMIN — FENTANYL CITRATE 50 MCG: 50 INJECTION INTRAMUSCULAR; INTRAVENOUS at 15:52

## 2023-02-08 ASSESSMENT — PAIN DESCRIPTION - PAIN TYPE: TYPE: ACUTE PAIN

## 2023-02-08 ASSESSMENT — PAIN DESCRIPTION - LOCATION
LOCATION: BACK;GENERALIZED
LOCATION: ABDOMEN
LOCATION: ABDOMEN

## 2023-02-08 ASSESSMENT — PAIN SCALES - GENERAL
PAINLEVEL_OUTOF10: 8
PAINLEVEL_OUTOF10: 8
PAINLEVEL_OUTOF10: 0
PAINLEVEL_OUTOF10: 10
PAINLEVEL_OUTOF10: 5

## 2023-02-08 ASSESSMENT — ENCOUNTER SYMPTOMS
ABDOMINAL PAIN: 1
RESPIRATORY NEGATIVE: 1

## 2023-02-08 ASSESSMENT — PAIN DESCRIPTION - ORIENTATION
ORIENTATION: LEFT
ORIENTATION: LEFT

## 2023-02-08 ASSESSMENT — PAIN DESCRIPTION - DESCRIPTORS
DESCRIPTORS: DISCOMFORT
DESCRIPTORS: DISCOMFORT

## 2023-02-08 ASSESSMENT — PAIN DESCRIPTION - ONSET: ONSET: ON-GOING

## 2023-02-08 ASSESSMENT — PAIN - FUNCTIONAL ASSESSMENT
PAIN_FUNCTIONAL_ASSESSMENT: PREVENTS OR INTERFERES SOME ACTIVE ACTIVITIES AND ADLS
PAIN_FUNCTIONAL_ASSESSMENT: PREVENTS OR INTERFERES SOME ACTIVE ACTIVITIES AND ADLS

## 2023-02-08 NOTE — PROGRESS NOTES
Per IR nurse Vinita Garrett, patient may have a ruptured AAA due to bloody drainage sent after procedure. Call to Dr. Flor Lino office to notify. 56- Per Dr. Flor Lino nurse, no need to transfer patient, there is no rupture maryan this time per MD. MD aware of patient status. No new orders received.

## 2023-02-08 NOTE — PROGRESS NOTES
Unable to draw labs due to poor venous access, multiple staff attempted. Call placed to lab, phlebotomist to come draw morning labs.

## 2023-02-08 NOTE — H&P
Hospital Medicine History & Physical      PCP: Jaime Montalvo MD    Date of Admission: 2/7/2023    Date of Service: Pt seen/examined on 2/7/2023 and admitted to inpatient    Chief Complaint: Left flank pain and left abdominal pain, generalized weakness, chills      History Of Present Illness: The patient is a 81 y.o. male with past medical history as below and just had repair of abdominal aortic aneurysm 1 month ago by vascular surgery who presents to [facility] with worsening concern of left abdominal and flank pain over the last 4 to 5 days although also had some chills at home worsening over the last few days. He was sent for a CT scan of his abdomen per PCP and was found to have significant finding of worsening left psoas muscle swelling possibly suggestive of infectious process. He denies any new trauma to the area to of caused any worsening of the pain. He admits to the chills at home but denies any true fevers at home. He denies any other recent symptoms of the last few days of dizziness, syncope, chest pain, shortness of breath, dysuria, blood in urine/stool/sputum, rashes, leg swelling, nausea/vomiting/diarrhea.     Past Medical History:        Diagnosis Date    Hyperlipidemia     Hypertension     PAD (peripheral artery disease) (HCC)     stenting in left leg        Past Surgical History:        Procedure Laterality Date    ABDOMINAL AORTIC ANEURYSM REPAIR, ENDOVASCULAR N/A 1/4/2023    ENDOVASCULAR ABDOMINAL AORTIC ANEURYSM REPAIR, EMBOLIZATION OF RENAL ARTERY performed by Krzysztof Lee DO at [address] [facility] Right 1/4/2023    RIGHT FEMORAL ENDARTERECTOMY WITH PATCH performed by Krzysztof Lee DO at [address] [facility] Right     rotator cuff    TOE SURGERY Left        Medications Prior to Admission: Prior to Admission medications    Medication Sig Start Date End Date Taking? Authorizing Provider   lisinopril (PRINIVIL;ZESTRIL) 20 MG tablet Take 20 mg by mouth daily   Yes Historical Provider, MD   metoprolol succinate (TOPROL XL) 25 MG extended release tablet Take 1 tablet by mouth daily 1/31/23   LELE Morris NP   amLODIPine (NORVASC) 5 MG tablet Take 1 tablet by mouth daily 1/22/23   Kenia Arriola MD   tiZANidine (ZANAFLEX) 2 MG tablet TAKE 1 TABLET BY MOUTH THREE TIMES DAILY AS NEEDED FOR LOWER BACK PAIN  Patient taking differently: Take 2 mg by mouth 3 times daily as needed (lower back pain) 1/19/23   LELE Morris NP   atorvastatin (LIPITOR) 20 MG tablet TAKE 1 TABLET BY MOUTH DAILY 10/17/22   Kenia Arriola MD   tamsulosin (FLOMAX) 0.4 MG capsule TAKE 1 CAPSULE BY MOUTH DAILY 10/17/22   Kenia Arriola MD   albuterol sulfate HFA (VENTOLIN HFA) 108 (90 Base) MCG/ACT inhaler Inhale 2 puffs into the lungs 4 times daily as needed for Wheezing 4/18/22   Kenia Arriola MD   vitamin B-12 (CYANOCOBALAMIN) 500 MCG tablet Take 1 tablet by mouth daily 8/20/20   Kenia Arriola MD       Allergies:  Morphine    Social History:  The patient currently lives home    TOBACCO:   reports that he quit smoking about 5 weeks ago. His smoking use included cigarettes. He started smoking about 55 years ago. He has a 9.50 pack-year smoking history. He has never used smokeless tobacco.  ETOH:   reports current alcohol use. Family History:  Reviewed in detail and negative for DM, Early CAD, Cancer, CVA.  Positive as follows:        Problem Relation Age of Onset    Cancer Mother         uterine cancer     Other Father         MVA    Cancer Sister         breast    Cancer Brother         brain     Cancer Maternal Aunt         breast    No Known Problems Maternal Grandmother     No Known Problems Maternal Grandfather     No Known Problems Paternal Grandmother     No Known Problems Paternal Grandfather        REVIEW OF SYSTEMS:    and as noted in the HPI. All other systems reviewed and negative. PHYSICAL EXAM:    BP (!) 170/91   Pulse 63   Temp 98.2 °F (36.8 °C) (Oral)   Resp 15   SpO2 95%     General appearance: Currently alert and oriented x4, still having some noted left flank and left abdominal pain, no acute respiratory distress at this time  HEENT Normal cephalic, atraumatic without obvious deformity. Pupils equal, round, and reactive to light. Extra ocular muscles intact. Mildly dry mucous membranes, anicteric sclera  Neck: Supple, no JVD  Lungs: Clear breath sounds bilaterally  Heart: Regular rate and rhythm, no murmurs detected at this time  Abdomen: Soft, nondistended, left flank and left lower abdominal tenderness is noted, some left flank tenderness and left back tenderness noted, active bowel sounds noted  Extremities: No edema  Skin: No rashes  Neurologic: Grossly intact neurologically  Mental status: Alert, oriented, thought content appropriate. Capillary Refill: Acceptable  < 3 seconds  Peripheral Pulses: +3 Easily felt, not easily obliterated with pressure    CTA abdomen pelvis with and without IV contrast:  1. Successful endovascular repair of the abdominal aortic aneurysm. AP   diameter of the abdominal aorta is currently down to 5.5 cm from 6.1 cm   previously. 2. No evidence of endoleaks. 3. Left psoas collection measures 3.3 x 2.8 cm up from 1.4 x 1.7 cm   previously. This collection abuts the aorta and there is concern for   possible infection. 4. Disease at the origins of renal arteries especially on the right and there   is suspicion of a likely infarct in the right kidney. 5. Diverticulosis but no acute diverticulitis. RECOMMENDATIONS:   1. Aspiration of the left psoas fluid collection to rule out abscess.    2. For management of fusiform AAA:   Note: Recommend Vascular consultation if a fusiform AAA enlarges by >0.5 cm   in 6 months or >1 cm in 1 year or for a saccular AAA of any size. CBC   Recent Labs     02/07/23 1853   WBC 11.9*   HGB 12.3*   HCT 37.4*         RENAL  Recent Labs     02/07/23 1853   *   K 3.6   CL 96*   CO2 24   BUN 17   CREATININE 1.1     LFT'S  No results for input(s): AST, ALT, ALB, BILIDIR, BILITOT, ALKPHOS in the last 72 hours. COAG  No results for input(s): INR in the last 72 hours. CARDIAC ENZYMES  No results for input(s): CKTOTAL, CKMB, CKMBINDEX, TROPONINI in the last 72 hours.     U/A:    Lab Results   Component Value Date/Time    NITRITE negative 02/07/2023 02:09 PM    COLORU Yellow 02/07/2023 06:53 PM    COLORU yellow 02/07/2023 02:09 PM    WBCUA 1 02/07/2023 06:53 PM    RBCUA 8 02/07/2023 06:53 PM    BACTERIA None Seen 02/07/2023 06:53 PM    CLARITYU Clear 02/07/2023 06:53 PM    CLARITYU cloudy 02/07/2023 02:09 PM    SPECGRAV 1.088 02/07/2023 06:53 PM    SPECGRAV <=1.005 02/07/2023 02:09 PM    LEUKOCYTESUR Negative 02/07/2023 06:53 PM    LEUKOCYTESUR negative 02/07/2023 02:09 PM    BLOODU LARGE 02/07/2023 06:53 PM    BLOODU large 02/07/2023 02:09 PM    GLUCOSEU Negative 02/07/2023 06:53 PM    GLUCOSEU negative 02/07/2023 02:09 PM       ABG  No results found for: LTP7GJJ, BEART, D4FCWRMT, PHART, THGBART, JGI3FYB, PO2ART, LJF9BRO        Active Hospital Problems    Diagnosis Date Noted    Abdominal pain [R10.9] 02/07/2023     Priority: Medium    Psoas abscess, left [facility]) [K68.12] 02/07/2023     Priority: Medium    PVD (peripheral vascular disease) ([facility] [address] [I73.9] 01/06/2023     Priority: Medium         PHYSICIANS CERTIFICATION:    I certify that Jennifer Mary is expected to be hospitalized for greater than 2 midnights based on the following assessment and plan:      ASSESSMENT/PLAN:  Psoas abscess left  Peripheral vascular disease  Hypertension    Plan:  Has a concerning finding of what looks to be an enlarging psoas abscess but does not seem to be septic in nature  Blood cultures obtained, pending procalcitonin  Starting patient on cefepime and vancomycin  Continue patient on 100 per hour of normal saline x20 hours for IV fluid hydration  Keeping patient n.p.o. at midnight  Vascular surgery consult for suspected left psoas abscess  Infectious disease consult for suspected left psoas abscess  Repeat labs daily  Morphine as needed  Holding any blood thinners at this time other than DVT prophylaxis        DVT Prophylaxis: Lovenox  Diet: Diet NPO Exceptions are: Ice Chips, Sips of Water with Meds  Code Status: Full Code  PT/OT Eval Status: Ambulatory    Dispo -pending clinical course       Jay Saavedra DO    Thank you Theo Sanchez MD for the opportunity to be involved in this patient's care. If you have any questions or concerns please feel free to contact me at [phone].

## 2023-02-08 NOTE — CONSULTS
[id number] [facility]  Consultation Note    2/8/2023 8:34 AM    Chief Complaint: Left Flank Pain     Reason for Consultation: Left Psoas Abscess    History of Present Illness:  Patient is a 81 y.o. male who presented to the ED on 2/7/2023 with complaints of Left Flank Pain. He has a significant PMH of HTN, HLD, PAD and EVAR with Right Femoral Endarterectomy with Dr. Jazzmine Avila on 1/4/2023. He was seen in follow up by Dr. Jazzmine Avila on 1/19/2023 and was doing okay at that time. The patient reports he has had chills for the past 1-2 weeks. He was seen by Dr. Sunitha Dang yesterday for HTN management and the pt's pain was concerning. A CTA Abdomen Pelvis was ordered which revealed a Left Psoas collection which was concerning for infection with an increased in size from CT about 1 month ago. The CTA did show a successful EVAR with no evidence of endo leaking. The patient continues to report pain to his Left Abdomen/Flank area. He is seen in the ED as he is awaiting a room in the hospital. He is alert and oriented, he appears to be in stable condition at this time. Review of Systems  Review of Systems   Constitutional:  Positive for chills (For 1-2 weeks). HENT: Negative. Respiratory: Negative. Cardiovascular: Negative. Gastrointestinal:  Positive for abdominal pain (Left Abdominal/Flank pain). Genitourinary: Negative. Musculoskeletal: Negative. Skin: Negative. Neurological: Negative. Psychiatric/Behavioral: Negative.         Past Medical History:   Diagnosis Date    Hyperlipidemia     Hypertension     PAD (peripheral artery disease) (HCC)     stenting in left leg        Past Surgical History:   Procedure Laterality Date    ABDOMINAL AORTIC ANEURYSM REPAIR, ENDOVASCULAR N/A 1/4/2023    ENDOVASCULAR ABDOMINAL AORTIC ANEURYSM REPAIR, EMBOLIZATION OF RENAL ARTERY performed by Sherrell Cortez DO at [address] [address] [facility] Right 1/4/2023    RIGHT FEMORAL ENDARTERECTOMY WITH PATCH performed by Marco Telles DO at [address] [address]     rotator cuff    TOE SURGERY Left        Allergies   Allergen Reactions    Morphine      Family reports hx of intolerance--patient himself has not had. Family requests we add to profile. Patient has received medication-no complications noted. Social History     Socioeconomic History    Marital status: Single     Spouse name: Not on file    Number of children: Not on file    Years of education: Not on file    Highest education level: Not on file   Occupational History    Not on file   Tobacco Use    Smoking status: Former     Packs/day: 0.25     Years: 38.00     Pack years: 9.50     Types: Cigarettes     Start date:      Quit date: 1/3/2023     Years since quittin.0    Smokeless tobacco: Never    Tobacco comments:     cutting down as high as 3 pk/d to as low . 25    Vaping Use    Vaping Use: Never used   Substance and Sexual Activity    Alcohol use: Yes     Comment: rarely    Drug use: Never    Sexual activity: Not on file   Other Topics Concern    Not on file   Social History Narrative    Not on file     Social Determinants of Health     Financial Resource Strain: Low Risk     Difficulty of Paying Living Expenses: Not hard at all   Food Insecurity: No Food Insecurity    Worried About [facility] in the Last Year: Never true    [address] [facility] in the Last Year: Never true   Transportation Needs: No Transportation Needs    Lack of Transportation (Medical): No    Lack of Transportation (Non-Medical):  No   Physical Activity: Inactive    Days of Exercise per Week: 7 days    Minutes of Exercise per Session: 0 min   Stress: Not on file   Social Connections: Not on file   Intimate Partner Violence: Not on file   Housing Stability: Unknown    Unable to Pay for Housing in the Last Year: Not on file    Number of Places Lived in the Last Year: Not on file    Unstable Housing in the Last Year: No       Family History   Problem Relation Age of Onset    Cancer Mother         uterine cancer     Other Father         MVA    Cancer Sister         breast    Cancer Brother         brain     Cancer Maternal Aunt         breast    No Known Problems Maternal Grandmother     No Known Problems Maternal Grandfather     No Known Problems Paternal Grandmother     No Known Problems Paternal Grandfather        Vital Signs  Vitals:    02/07/23 2300 02/08/23 0100 02/08/23 0600 02/08/23 0815   BP: 138/70 (!) 154/73 (!) 170/91    Pulse: 61 72 63    Resp: 14 16 15    Temp:  98.2 °F (36.8 °C)  97.9 °F (36.6 °C)   TempSrc:  Oral  Oral   SpO2: 93% 94% 95%        I/O:  No intake or output data in the 24 hours ending 02/08/23 0834    Physical Exam:  General: no apparent distress, alert and oriented, afebrile  Psychiatric: mood and affect are within normal limits  Head/Eyes/Ears/Nose/Throat: normocephalic and atraumatic, face symmetric, normal hearing, nose - negative  Neck: normal appearance and no deformity, supple, trachea midline  Chest/Lungs: non labored breathing no tachypnea, retractions or cyanosis  Cardiac: Heart regular rate and rhythm  Abdomen: soft and nondistended, mild RLQ tenderness, active bowel sounds  Extremities: normal strength, tone, and muscle mass, no deformities, no significant edema to BLE  Vascular: extremities warm and well perfused, no signs of cyanosis or ischemia  Pulses:  -R DP: +1/4 palpable  -L DP: +1/4 palpable    Labs  Lab Results   Component Value Date/Time    WBC 11.9 02/07/2023 06:53 PM    HGB 12.3 02/07/2023 06:53 PM    HCT 37.4 02/07/2023 06:53 PM    MCV 80.3 02/07/2023 06:53 PM     02/07/2023 06:53 PM     Lab Results   Component Value Date/Time     02/07/2023 06:53 PM    K 3.6 02/07/2023 06:53 PM    CL 96 02/07/2023 06:53 PM    CO2 24 02/07/2023 06:53 PM    BUN 17 02/07/2023 06:53 PM    CREATININE 1.1 02/07/2023 06:53 PM      No results found for: GLU    Scheduled Meds:    vancomycin  1,250 mg IntraVENous Q18H    vancomycin (VANCOCIN) intermittent dosing (placeholder)   Other RX Placeholder    atorvastatin  20 mg Oral Daily    metoprolol succinate  25 mg Oral Daily    tamsulosin  0.4 mg Oral Daily    amLODIPine  5 mg Oral Daily    sodium chloride flush  5-40 mL IntraVENous 2 times per day    [Held by provider] enoxaparin  40 mg SubCUTAneous Daily    cefepime  2,000 mg IntraVENous Q8H     Continuous Infusions:    sodium chloride      sodium chloride 100 mL/hr at 02/07/23 2353       Imaging:   CTA Abdomen Pelvis - 2/7/2023  Impression  1. Successful endovascular repair of the abdominal aortic aneurysm. AP diameter of the abdominal aorta is currently down to 5.5 cm from 6.1 cm previously. 2. No evidence of endoleaks. 3. Left psoas collection measures 3.3 x 2.8 cm up from 1.4 x 1.7 cm previously. This collection abuts the aorta and there is concern for possible infection. 4. 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 5. Diverticulosis but no acute diverticulitis. Assessment:  -S/P EVAR with Dr. Jimbo Singer on 1/4/2023 - coil embolization of the Right Distal Accessory Renal artery at this time as well  -Successful endovascular repair per CTA with no evidence of endoleaks  -Persistent Left Psoas collection which has increased in size since previous imaging  -Left Abdominal/Flank pain on admission    Plan:   -IV Antibiotics per Primary Team - ID consult pending as well  -Order placed for IR for CT Guided Psoas drainage - d/w Michelle in IR as well    All pertinent information and plan of care discussed with Dr. Marco Telles. All questions and concerns were addressed with the patient. I have discussed the above stated plan with the patient and the nurse. The patient verbalized understanding and agreed with the plan.     Thank you for allowing to us to participate in the care of Shaka Loni Garcia      Electronically signed by LELE Torres CNP on 2/8/2023 at 8:34 AM    VASCULAR STAFF    Patient evaluated at the same time in conjunction with the ACP or resident of which I performed greater than 50% of the history, physical exam, and/or medical decision making:    Reviewed CTA   Patient complaining of 1 week of abdominal, left groin and back pain  WBC stable, no fevers, no acidosis, blood cultures pending (negative pre op 4 weeks ago) therefore localized abscess in left RP space  Unfortunately as I was suspicious his aorta was probably infected when he presented and underwent emergent repair. Phlegmon likely extending into left psoas muscle  Exam: tender LLQ and left groin, no erythema or streaking, right groin incision healing appropriately  Plan: aneurysm is excluded, would recommend aspirating the fluid in the retroperitoneum to remove and tailor antibiotic therapy. If this does not relieve patient's discomfort then he needs for repair which would involve either extraanatomic bypass with ligation of aorta or in situ rifampin soaked graft both have risks and benefits. The main issue is the aorta may be very difficult to hold sutures either way. Discussed with patient. Need to search for source of infection; suspect dentition, will probably need to remove the remainder of his teeth. High likelihood of mortality associated with this pathology as well as morbidity including: pain, infection long term, bleeding, renal failure and other visceral injury/failure if treated surgically. Patient seems to understand. Discussed with Dr. Alok Chilel with IR - will plan CT guided drainage today and continue observation for resolution of symptoms.      Suly Westbrook DO, FACS, FSVS, [address] [facility] and Endovascular Surgery

## 2023-02-08 NOTE — CONSULTS
Clinical Pharmacy Note  Vancomycin Consult    Edith Pemberton is a 81 y.o. male ordered Vancomycin for sepsis of unknown origin (likely psoas abscess); consult received from Dr. Naomi Rodriguez to manage therapy. Also receiving cefepime. Allergies:  Morphine     Temp max:  Temp (24hrs), Av °F (36.7 °C), Min:97.3 °F (36.3 °C), Max:98.5 °F (36.9 °C)      Recent Labs     23  1853   WBC 11.9*       Recent Labs     23  1853   BUN 17   CREATININE 1.1       No intake or output data in the 24 hours ending 23 0240    Culture Results:  pending    Ht Readings from Last 1 Encounters:   23 6' 2\" (1.88 m)        Wt Readings from Last 1 Encounters:   23 189 lb (85.7 kg)         Estimated Creatinine Clearance: 73 mL/min (based on SCr of 1.1 mg/dL). Assessment/Plan:  Day # 1 of vancomycin. Vancomycin 1750mg x 1 dose given in ED  Vancomycin 1250 mg IV every 18 hours ordered. Goal -600  Predicted   Level ordered after 2 doses with AM labs on     Thank you for the consult.    Lorena Malik, PharmD  2023 2:41 AM

## 2023-02-08 NOTE — PROGRESS NOTES
Hospitalist Progress Note      PCP: Crista Gr MD    Date of Admission: 2/7/2023        Subjective: Still having abdominal pain, mild though, also complaining of left lower abdominal pain worse when he walks      Medications:  Reviewed    Infusion Medications    sodium chloride      sodium chloride 100 mL/hr at 02/07/23 5241     Scheduled Medications    vancomycin  1,250 mg IntraVENous Q18H    vancomycin (VANCOCIN) intermittent dosing (placeholder)   Other RX Placeholder    atorvastatin  20 mg Oral Daily    metoprolol succinate  25 mg Oral Daily    tamsulosin  0.4 mg Oral Daily    amLODIPine  5 mg Oral Daily    sodium chloride flush  5-40 mL IntraVENous 2 times per day    enoxaparin  40 mg SubCUTAneous Daily    cefepime  2,000 mg IntraVENous Q8H     PRN Meds: sodium chloride flush, sodium chloride, acetaminophen **OR** acetaminophen, morphine **OR** morphine, guaiFENesin-dextromethorphan    No intake or output data in the 24 hours ending 02/08/23 0751    Physical Exam Performed:    BP (!) 170/91   Pulse 63   Temp 98.2 °F (36.8 °C) (Oral)   Resp 15   SpO2 95%     General appearance: No apparent distress  Neck: Supple  Respiratory:  Normal respiratory effort. Clear to auscultation, bilaterally without Rales/Wheezes/Rhonchi. Cardiovascular: Regular rate and rhythm with normal S1/S2 without murmurs, rubs or gallops. Abdomen: Soft, non-tender, no oozing from surgical site right inguinal area  Musculoskeletal: No clubbing, cyanosis  Skin: Skin color, texture, turgor normal.  No rashes or lesions.   Neurologic: No focal weakness  Psychiatric: Alert and oriented  Capillary Refill: Brisk, 3 seconds, normal   Peripheral Pulses: +2 palpable, equal bilaterally       Labs:   Recent Labs     02/07/23  1853   WBC 11.9*   HGB 12.3*   HCT 37.4*        Recent Labs     02/07/23  1853   *   K 3.6   CL 96*   CO2 24   BUN 17   CREATININE 1.1   CALCIUM 9.7     No results for input(s): AST, ALT, BILIDIR, BILITOT, ALKPHOS in the last 72 hours. No results for input(s): INR in the last 72 hours. No results for input(s): [place] in the last 72 hours. Urinalysis:      Lab Results   Component Value Date/Time    NITRU Negative 02/07/2023 06:53 PM    WBCUA 1 02/07/2023 06:53 PM    BACTERIA None Seen 02/07/2023 06:53 PM    RBCUA 8 02/07/2023 06:53 PM    BLOODU LARGE 02/07/2023 06:53 PM    BLOODU large 02/07/2023 02:09 PM    SPECGRAV 1.088 02/07/2023 06:53 PM    SPECGRAV <=1.005 02/07/2023 02:09 PM    GLUCOSEU Negative 02/07/2023 06:53 PM    GLUCOSEU negative 02/07/2023 02:09 PM       Radiology:  No orders to display       IP CONSULT TO VASCULAR SURGERY  IP CONSULT TO VASCULAR SURGERY  PHARMACY TO DOSE VANCOMYCIN  IP CONSULT TO INFECTIOUS DISEASES    Assessment/Plan:    Active Hospital Problems    Diagnosis     Abdominal pain [R10.9]      Priority: Medium    Psoas abscess, left ([facility] [address] [K68.12]      Priority: Medium    PVD (peripheral vascular disease) ([facility] [address] [I73.9]      Priority: Medium     Possible sepsis, was tachypnea and leukocytosis along with intra-abdominal collection possibly abscess, IV antibiotics with cefepime and vancomycin at this time will follow vascular surgery consultation, might need aspiration, plan of care discussed with the patient all questions answered.   Aortic aneurysm status postrepair  Anemia mild, no immediate need for transfusion  Generalized weakness, likely due to above monitor closely  Hematuria, follow-up as outpatient repeat urine analysis per primary care physician and might need to follow-up with urology will defer decision to primary care physician  Essential hypertension, uncontrolled, p.o. medication  Peripheral arterial disease, vascular surgery consulted    Diet: Diet NPO Exceptions are: Ice Chips, Sips of Water with Meds  Code Status: Full Code    Cherry Karimi MD

## 2023-02-08 NOTE — PROGRESS NOTES
0915- Morning assessments and vital signs complete. MD is at bedside/. Patient given medications as prescribed. Bedside telemetry connected. Patient request PRN pain medication at this time. PRN morphine is to soon to administer. Patient declines other medications and states that he is okay to wait. He is NPO for scheduled procedure today at 3pm. Call light is within reach, family is at bedside. 1100- Patient given PRN pain medication IVP per request. Medication has been effective in decreasing pain. Family remains at bedside. Urinal emptied. IV fluids infusing. Lab called and notified of STAT PT/INR, awaiting tech to come to draw lab at this time. 1445- 2nd call placed to lab for PT/INR lab collect. 1515- 3rd call placed to lab for PT/INR. RN is at bedside to transport patient to IR for drain placement. Per RN, PT/INR can be check there before the procedure. Patient transported to IR via stretcher. Family is at bedside. Report called to Zaire/RN, patient will be admitted to unit after drain placement. All patient belongings are with family.

## 2023-02-08 NOTE — TELEPHONE ENCOUNTER
Zelalem Reese called to make Dr Sheron Stanley aware that pt was back from IR. States IR was only able to drain 5cc of bloody fluid. IR RN also made a comment that the AAA had possibly ruptured. Zelalem Reese informed per CTA yesterday, there was no endoleak or rupture. Per Zelalem Reese, all vital signs are stable. Call placed to Dr Sheron Stanley to make aware of the call.

## 2023-02-08 NOTE — PROGRESS NOTES
Patient arrives to room 4261 via stretcher. Patient is alert and oriented x 4. VS stable. Patient denies pain at this time. Respirations easy and even. Dressing patch intact via left abdomen/flank with no drainage noted. Call light in reach of patient. Bed alarm active. Will continue to monitor.

## 2023-02-08 NOTE — PROGRESS NOTES
Medication Reconciliation     List of medications patient is currently taking is complete. Source of information: 1. Conversation with patient at bedside                                      2. EPIC records      Allergies  Morphine     Notes regarding home medications:  1. Patient states he took all of the morning doses of his home medications today prior to arrival in the ED.     Samantha Holguin, Pharmacy Intern  2/7/2023 9:17 PM

## 2023-02-09 PROBLEM — Z98.890 HISTORY OF ABDOMINAL AORTIC ANEURYSM (AAA) REPAIR: Status: ACTIVE | Noted: 2023-02-09

## 2023-02-09 PROBLEM — R70.0 ELEVATED ERYTHROCYTE SEDIMENTATION RATE: Status: ACTIVE | Noted: 2023-02-09

## 2023-02-09 PROBLEM — R79.82 CRP ELEVATED: Status: ACTIVE | Noted: 2023-02-09

## 2023-02-09 PROBLEM — E44.0 MODERATE MALNUTRITION (HCC): Chronic | Status: ACTIVE | Noted: 2023-02-09

## 2023-02-09 PROBLEM — D72.829 LEUKOCYTOSIS: Status: ACTIVE | Noted: 2023-02-09

## 2023-02-09 PROBLEM — K08.9 POOR DENTITION: Status: ACTIVE | Noted: 2023-02-09

## 2023-02-09 LAB
ALBUMIN SERPL-MCNC: 3.1 G/DL (ref 3.4–5)
ANION GAP SERPL CALCULATED.3IONS-SCNC: 12 MMOL/L (ref 3–16)
BASOPHILS ABSOLUTE: 0.2 K/UL (ref 0–0.2)
BASOPHILS RELATIVE PERCENT: 1.6 %
BUN BLDV-MCNC: 12 MG/DL (ref 7–20)
C-REACTIVE PROTEIN: 97.7 MG/L (ref 0–5.1)
CALCIUM SERPL-MCNC: 8.8 MG/DL (ref 8.3–10.6)
CHLORIDE BLD-SCNC: 101 MMOL/L (ref 99–110)
CO2: 22 MMOL/L (ref 21–32)
CREAT SERPL-MCNC: 1 MG/DL (ref 0.8–1.3)
EOSINOPHILS ABSOLUTE: 0.4 K/UL (ref 0–0.6)
EOSINOPHILS RELATIVE PERCENT: 3.7 %
GFR SERPL CREATININE-BSD FRML MDRD: >60 ML/MIN/{1.73_M2}
GLUCOSE BLD-MCNC: 105 MG/DL (ref 70–99)
HCT VFR BLD CALC: 30.2 % (ref 40.5–52.5)
HEMOGLOBIN: 10.5 G/DL (ref 13.5–17.5)
INR BLD: 1.2 (ref 0.88–1.12)
INR BLD: 1.21 (ref 0.87–1.14)
LYMPHOCYTES ABSOLUTE: 1.6 K/UL (ref 1–5.1)
LYMPHOCYTES RELATIVE PERCENT: 16.2 %
MAGNESIUM: 1.8 MG/DL (ref 1.8–2.4)
MCH RBC QN AUTO: 27 PG (ref 26–34)
MCHC RBC AUTO-ENTMCNC: 34.9 G/DL (ref 31–36)
MCV RBC AUTO: 77.5 FL (ref 80–100)
MONOCYTES ABSOLUTE: 1.1 K/UL (ref 0–1.3)
MONOCYTES RELATIVE PERCENT: 11.1 %
NEUTROPHILS ABSOLUTE: 6.8 K/UL (ref 1.7–7.7)
NEUTROPHILS RELATIVE PERCENT: 67.4 %
PDW BLD-RTO: 15.9 % (ref 12.4–15.4)
PHOSPHORUS: 2.9 MG/DL (ref 2.5–4.9)
PLATELET # BLD: 302 K/UL (ref 135–450)
PMV BLD AUTO: 7.9 FL (ref 5–10.5)
POTASSIUM SERPL-SCNC: 3.4 MMOL/L (ref 3.5–5.1)
PROTHROMBIN TIME: 15.3 SEC (ref 11.7–14.5)
RBC # BLD: 3.9 M/UL (ref 4.2–5.9)
SEDIMENTATION RATE, ERYTHROCYTE: 103 MM/HR (ref 0–20)
SODIUM BLD-SCNC: 135 MMOL/L (ref 136–145)
VANCOMYCIN RANDOM: 9.5 UG/ML
WBC # BLD: 10.1 K/UL (ref 4–11)

## 2023-02-09 PROCEDURE — 94760 N-INVAS EAR/PLS OXIMETRY 1: CPT

## 2023-02-09 PROCEDURE — 80069 RENAL FUNCTION PANEL: CPT

## 2023-02-09 PROCEDURE — 86140 C-REACTIVE PROTEIN: CPT

## 2023-02-09 PROCEDURE — 6360000002 HC RX W HCPCS: Performed by: STUDENT IN AN ORGANIZED HEALTH CARE EDUCATION/TRAINING PROGRAM

## 2023-02-09 PROCEDURE — 1200000000 HC SEMI PRIVATE

## 2023-02-09 PROCEDURE — 85025 COMPLETE CBC W/AUTO DIFF WBC: CPT

## 2023-02-09 PROCEDURE — 2580000003 HC RX 258: Performed by: STUDENT IN AN ORGANIZED HEALTH CARE EDUCATION/TRAINING PROGRAM

## 2023-02-09 PROCEDURE — 36415 COLL VENOUS BLD VENIPUNCTURE: CPT

## 2023-02-09 PROCEDURE — 80202 ASSAY OF VANCOMYCIN: CPT

## 2023-02-09 PROCEDURE — 6360000002 HC RX W HCPCS: Performed by: INTERNAL MEDICINE

## 2023-02-09 PROCEDURE — 99223 1ST HOSP IP/OBS HIGH 75: CPT | Performed by: INTERNAL MEDICINE

## 2023-02-09 PROCEDURE — 83735 ASSAY OF MAGNESIUM: CPT

## 2023-02-09 PROCEDURE — APPNB30 APP NON BILLABLE TIME 0-30 MINS: Performed by: NURSE PRACTITIONER

## 2023-02-09 PROCEDURE — 6370000000 HC RX 637 (ALT 250 FOR IP): Performed by: STUDENT IN AN ORGANIZED HEALTH CARE EDUCATION/TRAINING PROGRAM

## 2023-02-09 PROCEDURE — 85610 PROTHROMBIN TIME: CPT

## 2023-02-09 PROCEDURE — 6370000000 HC RX 637 (ALT 250 FOR IP): Performed by: INTERNAL MEDICINE

## 2023-02-09 PROCEDURE — 2580000003 HC RX 258: Performed by: INTERNAL MEDICINE

## 2023-02-09 PROCEDURE — 85652 RBC SED RATE AUTOMATED: CPT

## 2023-02-09 PROCEDURE — 87641 MR-STAPH DNA AMP PROBE: CPT

## 2023-02-09 RX ORDER — METRONIDAZOLE 500 MG/100ML
500 INJECTION, SOLUTION INTRAVENOUS EVERY 8 HOURS
Status: DISCONTINUED | OUTPATIENT
Start: 2023-02-10 | End: 2023-02-13

## 2023-02-09 RX ORDER — POTASSIUM CHLORIDE 750 MG/1
40 TABLET, FILM COATED, EXTENDED RELEASE ORAL ONCE
Status: COMPLETED | OUTPATIENT
Start: 2023-02-09 | End: 2023-02-09

## 2023-02-09 RX ADMIN — CEFEPIME 2000 MG: 2 INJECTION, POWDER, FOR SOLUTION INTRAVENOUS at 12:17

## 2023-02-09 RX ADMIN — AMLODIPINE BESYLATE 5 MG: 5 TABLET ORAL at 08:57

## 2023-02-09 RX ADMIN — POTASSIUM CHLORIDE 40 MEQ: 750 TABLET, FILM COATED, EXTENDED RELEASE ORAL at 12:15

## 2023-02-09 RX ADMIN — MORPHINE SULFATE 4 MG: 4 INJECTION, SOLUTION INTRAMUSCULAR; INTRAVENOUS at 10:31

## 2023-02-09 RX ADMIN — Medication 10 ML: at 08:57

## 2023-02-09 RX ADMIN — Medication 10 ML: at 21:05

## 2023-02-09 RX ADMIN — CEFEPIME 2000 MG: 2 INJECTION, POWDER, FOR SOLUTION INTRAVENOUS at 21:09

## 2023-02-09 RX ADMIN — TAMSULOSIN HYDROCHLORIDE 0.4 MG: 0.4 CAPSULE ORAL at 08:57

## 2023-02-09 RX ADMIN — SODIUM CHLORIDE: 9 INJECTION, SOLUTION INTRAVENOUS at 09:16

## 2023-02-09 RX ADMIN — MORPHINE SULFATE 4 MG: 4 INJECTION, SOLUTION INTRAMUSCULAR; INTRAVENOUS at 21:05

## 2023-02-09 RX ADMIN — METOPROLOL SUCCINATE 25 MG: 25 TABLET, EXTENDED RELEASE ORAL at 08:56

## 2023-02-09 RX ADMIN — ATORVASTATIN CALCIUM 20 MG: 20 TABLET, FILM COATED ORAL at 08:56

## 2023-02-09 RX ADMIN — SODIUM CHLORIDE: 9 INJECTION, SOLUTION INTRAVENOUS at 10:36

## 2023-02-09 RX ADMIN — ACETAMINOPHEN 650 MG: 325 TABLET ORAL at 21:05

## 2023-02-09 RX ADMIN — CEFEPIME 2000 MG: 2 INJECTION, POWDER, FOR SOLUTION INTRAVENOUS at 05:53

## 2023-02-09 RX ADMIN — VANCOMYCIN HYDROCHLORIDE 1500 MG: 1.5 INJECTION, POWDER, LYOPHILIZED, FOR SOLUTION INTRAVENOUS at 10:42

## 2023-02-09 ASSESSMENT — PAIN SCALES - WONG BAKER
WONGBAKER_NUMERICALRESPONSE: 0

## 2023-02-09 ASSESSMENT — PAIN SCALES - GENERAL
PAINLEVEL_OUTOF10: 2
PAINLEVEL_OUTOF10: 7
PAINLEVEL_OUTOF10: 2
PAINLEVEL_OUTOF10: 8
PAINLEVEL_OUTOF10: 2
PAINLEVEL_OUTOF10: 2
PAINLEVEL_OUTOF10: 0

## 2023-02-09 ASSESSMENT — PAIN DESCRIPTION - LOCATION
LOCATION: BACK;HIP
LOCATION: ABDOMEN

## 2023-02-09 ASSESSMENT — PAIN DESCRIPTION - ORIENTATION: ORIENTATION: LEFT

## 2023-02-09 ASSESSMENT — PAIN DESCRIPTION - DESCRIPTORS: DESCRIPTORS: DISCOMFORT

## 2023-02-09 NOTE — PROGRESS NOTES
[facility]  Progress Note    2/9/2023 10:35 AM    Chief Complaint: Left Flank Pain     Reason for Consult: Left Psoas Abscess    Subjective: Pt seen resting in bed, reports Left Abdominal/Flank pain improved from yesterday, still with some Left Groin discomfort, otherwise is feeling well without fever or chills    Vital Signs:  Vitals:    02/09/23 0527 02/09/23 0815 02/09/23 0838 02/09/23 1031   BP:  (!) 165/86     Pulse:  62     Resp:  16 16 20   Temp:  98 °F (36.7 °C)     TempSrc:  Oral     SpO2:  97% 96%    Weight: 188 lb 11.4 oz (85.6 kg)      Height:           I/O:    Intake/Output Summary (Last 24 hours) at 2/9/2023 1035  Last data filed at 2/9/2023 0924  Gross per 24 hour   Intake --   Output 1300 ml   Net -1300 ml       Physical Exam:   General: no apparent distress, alert and oriented, afebrile  Chest/Lungs: non labored breathing no tachypnea, retractions or cyanosis  Cardiac: Heart regular rate and rhythm  Abdomen: soft and nondistended, mild Left abdomen tenderness with deep palpation, denies Left Flank tenderness to palpation, bandage from    Extremities: normal strength, tone, and muscle mass, no deformities, no significant edema to BLE, mild left groin tenderness  Vascular: extremities warm and well perfused, no signs of cyanosis or ischemia    Labs:   Lab Results   Component Value Date/Time     02/09/2023 08:49 AM    K 3.4 02/09/2023 08:49 AM    K 3.6 02/07/2023 06:53 PM     02/09/2023 08:49 AM    CO2 22 02/09/2023 08:49 AM    BUN 12 02/09/2023 08:49 AM    CREATININE 1.0 02/09/2023 08:49 AM    GFRAA >60 06/14/2022 08:55 AM    LABGLOM >60 02/09/2023 08:49 AM    GLUCOSE 105 02/09/2023 08:49 AM    PHOS 2.9 02/09/2023 08:49 AM    MG 1.80 02/09/2023 08:49 AM    CALCIUM 8.8 02/09/2023 08:49 AM     Lab Results   Component Value Date/Time    WBC 10.1 02/09/2023 08:48 AM    RBC 3.90 02/09/2023 08:48 AM    HGB 10.5 02/09/2023 08:48 AM    HCT 30.2 02/09/2023 08:48 AM MCV 77.5 02/09/2023 08:48 AM    RDW 15.9 02/09/2023 08:48 AM     02/09/2023 08:48 AM     Lab Results   Component Value Date    INR 1.21 (H) 02/09/2023    PROTIME 15.3 (H) 02/09/2023        Scheduled Meds:    vancomycin  1,500 mg IntraVENous Q18H    vancomycin (VANCOCIN) intermittent dosing (placeholder)   Other RX Placeholder    atorvastatin  20 mg Oral Daily    metoprolol succinate  25 mg Oral Daily    tamsulosin  0.4 mg Oral Daily    amLODIPine  5 mg Oral Daily    sodium chloride flush  5-40 mL IntraVENous 2 times per day    [Held by provider] enoxaparin  40 mg SubCUTAneous Daily    cefepime  2,000 mg IntraVENous Q8H     Continuous Infusions:    sodium chloride 25 mL/hr at 02/09/23 0916       Imaging:   CTA Abdomen Pelvis - 2/7/2023  Impression  1. Successful endovascular repair of the abdominal aortic aneurysm. AP diameter of the abdominal aorta is currently down to 5.5 cm from 6.1 cm previously. 2. No evidence of endoleaks. 3. Left psoas collection measures 3.3 x 2.8 cm up from 1.4 x 1.7 cm previously. This collection abuts the aorta and there is concern for possible infection. 4. 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 5. Diverticulosis but no acute diverticulitis.      Assessment:  -S/P EVAR with Dr. Kam Oswald on 1/4/2023 - coil embolization of the Right Distal Accessory Renal artery at this time as well  -Successful endovascular repair per CTA with no evidence of endoleaks  -Persistent Left Psoas collection which has increased in size since previous imaging  -Left Abdominal/Flank pain on admission  -CT Guided Needle drainage yesterday - only able to drain about 5 cc per report     Plan:   -IV Antibiotics per Primary Team - ID consult pending as well  -IR to try drainage of abscess again with percutaneous drain left in place as possible - scheduling per IR - d/w Michelle in IR - order placed    All pertinent information and plan of care discussed with Dr. Brionna Solomon. All questions and concerns were addressed with the patient. I have discussed the above stated plan with the patient and the nurse. The patient verbalized understanding and agreed with the plan.     Thank you for allowing to us to participate in the care of Rajwinder Cha Sr      Electronically signed by LELE Fierro CNP on 2/9/2023 at 10:35 AM     VASCULAR STAFF    Patient evaluated at the same time in conjunction with the ACP or resident of which I performed greater than 50% of the history, physical exam, and/or medical decision making:    Patient feels a little better after aspiration  Apparently it was blood tinged  Patient's aneurysm well excluded on CTA and on delay images  Psoas collection likely abscess  Discussed with Dr. Jose Sarah with IR and will attempt repeat drainage with possible leaving of drain today if possible per their department  Continue with IV antibiotics  Case reviewed with Dr. Shay Renteria as well and percutaneous drainage is the best option right now with antibiotics  Still remains high risk for morbidity and mortality with infected aorta as the likely source  Will follow  Keep NPO    Brionna Solomon, DO, FACS, FSVS, [address] [facility] Endovascular Surgery

## 2023-02-09 NOTE — PROGRESS NOTES
Physician Progress Note      PATIENT:               Mckenzie Suarez  CSN #:                  [id number]  :                       1950  ADMIT DATE:       2023 6:16 PM  [address] [place] DATE:     PROVIDER #:        Abril Sandoval DO          QUERY TEXT:    Pt admitted on 1/3/23 with Symptomatic inflammatory abdominal aortic aneurysm   and  underwent Right Femoral Endarterectomy and repair of AAA on 2023. Readmitted 23 with Left psoas abscess. ? If possible, please document in   progress notes and discharge summary the relationship if any between the  Left   psoas collection and the surgery: The medical record reflects the following:  Risk Factors: PMH- PAD, HTN. S/P Right Femoral Endarterectomy and repair of   AAA on 2023  Clinical Indicators:  CT Abd 23-Left psoas collection measures 3.3 x 2.8   cm up from 1.4 x 1.7 cm previously. ? This collection abuts the aorta and there   is concern for possible infection. ....... Drew Welsh Per Vascular consult note on   23-Persistent Left Psoas collection which has increased in size since   previous imaging. Per H&P on 23- Psoas abscess left  Treatment: Cefepime IV, Vanco IV, CT guided aspiration of a psoas collection    Thank Reji Carrero RN BSN CDS CRCR  [email or web address]  Options provided:  -- Left psoas abscess is due to the procedure  -- Left psoas abscess is not due to the procedure, but is due to other   incidental risk factor, Please specify other incidental risk factor.   -- Other - I will add my own diagnosis  -- Disagree - Not applicable / Not valid  -- Disagree - Clinically unable to determine / Unknown  -- Refer to Clinical Documentation Reviewer    PROVIDER RESPONSE TEXT:    Left psoas abscess is not due to the procedure but due to likely infected   aorta with inflammation and contained rupture - preceded presentation for   repair    Query created by: Tito Gutiérrez on 2023 11:05 AM      Electronically signed by:  Abril Sandoval DO 2/9/2023 11:10 AM

## 2023-02-09 NOTE — PROGRESS NOTES
4 Eyes Skin Assessment     NAME:  Violeta David Sr  YOB: 1950  MEDICAL RECORD NUMBER:  [id number]    The patient is being assessed for  Admission    I agree that One RN has performed a thorough Head to Toe Skin Assessment on the patient. ALL assessment sites listed below have been assessed. Areas assessed by both nurses:    Head, Face, Ears, Shoulders, Back, Chest, Arms, Elbows, Hands, Sacrum. Buttock, Coccyx, Ischium, and Legs. Feet and Heels        Does the Patient have a Wound?  No noted wound(s)       Ashok Prevention initiated by RN: NA   Wound Care Orders initiated by RN: NA    Pressure Injury (Stage 3,4, Unstageable, DTI, NWPT, and Complex wounds) if present, place referral order by RN under : NA    New and Established Ostomies, if present place, referral order under : NA      Nurse 1 eSignature: Electronically signed by Sunny Smith RN on 2/8/23 at 7:04 PM EST    **SHARE this note so that the co-signing nurse can place an eSignature**    Nurse 2 eSignature: Electronically signed by Obie Lynne RN on 2/8/23 at 7:05 PM EST

## 2023-02-09 NOTE — PROGRESS NOTES
Hospitalist Progress Note      PCP: Clarisa Shane MD    Date of Admission: 2/7/2023        Subjective: Up to chair feels okay, still having some abdominal pain no fever or chills      Medications:  Reviewed    Infusion Medications    sodium chloride       Scheduled Medications    vancomycin  1,250 mg IntraVENous Q18H    vancomycin (VANCOCIN) intermittent dosing (placeholder)   Other RX Placeholder    atorvastatin  20 mg Oral Daily    metoprolol succinate  25 mg Oral Daily    tamsulosin  0.4 mg Oral Daily    amLODIPine  5 mg Oral Daily    sodium chloride flush  5-40 mL IntraVENous 2 times per day    [Held by provider] enoxaparin  40 mg SubCUTAneous Daily    cefepime  2,000 mg IntraVENous Q8H     PRN Meds: sodium chloride flush, sodium chloride, acetaminophen **OR** acetaminophen, morphine **OR** morphine, guaiFENesin-dextromethorphan      Intake/Output Summary (Last 24 hours) at 2/9/2023 0915  Last data filed at 2/9/2023 0527  Gross per 24 hour   Intake --   Output 1100 ml   Net -1100 ml       Physical Exam Performed:    BP (!) 165/86   Pulse 62   Temp 98 °F (36.7 °C) (Oral)   Resp 16   Ht 6' 2\" (1.88 m)   Wt 188 lb 11.4 oz (85.6 kg)   SpO2 96%   BMI 24.23 kg/m²     General appearance: No apparent distress  Neck: Supple  Respiratory:  Normal respiratory effort. Clear to auscultation, bilaterally without Rales/Wheezes/Rhonchi. Cardiovascular: Regular rate and rhythm with normal S1/S2 without murmurs, rubs or gallops. Abdomen: Soft, non-tender, non-distended   Musculoskeletal: No clubbing, cyanosis   Skin: Skin color, texture, turgor normal.  No rashes or lesions.   Neurologic:  No focal weakness   Psychiatric: Alert and oriented  Capillary Refill: Brisk, 3 seconds, normal   Peripheral Pulses: +2 palpable, equal bilaterally       Labs:   Recent Labs     02/07/23  1853 02/08/23  0847 02/09/23  0848   WBC 11.9* 10.6 10.1   HGB 12.3* 11.4* 10.5*   HCT 37.4* 33.4* 30.2*    310 302     Recent Labs 02/07/23  1853 02/08/23  0847   * 137   K 3.6 3.9   CL 96* 102   CO2 24 24   BUN 17 14   CREATININE 1.1 1.0   CALCIUM 9.7 9.2   PHOS  --  3.0     No results for input(s): AST, ALT, BILIDIR, BILITOT, ALKPHOS in the last 72 hours. No results for input(s): INR in the last 72 hours. No results for input(s): [place] in the last 72 hours. Urinalysis:      Lab Results   Component Value Date/Time    NITRU Negative 02/07/2023 06:53 PM    WBCUA 1 02/07/2023 06:53 PM    BACTERIA None Seen 02/07/2023 06:53 PM    RBCUA 8 02/07/2023 06:53 PM    BLOODU LARGE 02/07/2023 06:53 PM    BLOODU large 02/07/2023 02:09 PM    SPECGRAV 1.088 02/07/2023 06:53 PM    SPECGRAV <=1.005 02/07/2023 02:09 PM    GLUCOSEU Negative 02/07/2023 06:53 PM    GLUCOSEU negative 02/07/2023 02:09 PM       Radiology:  CT DRAINAGE VISCERAL PERCUTANEOUS   Final Result   Successful CT-guided aspiration of a psoas collection with removal of 5 cc of   old dark serous bloody material sent for analysis as above. CT GUIDED NEEDLE PLACEMENT   Final Result   Successful CT-guided aspiration of a psoas collection with removal of 5 cc of   old dark serous bloody material sent for analysis as above. IP CONSULT TO VASCULAR SURGERY  IP CONSULT TO VASCULAR SURGERY  PHARMACY TO DOSE VANCOMYCIN  IP CONSULT TO INFECTIOUS DISEASES    Assessment/Plan:    Active Hospital Problems    Diagnosis     Abdominal pain [R10.9]      Priority: Medium    Psoas abscess, left (HCC) [K68.12]      Priority: Medium    PVD (peripheral vascular disease) ([facility] [address] [address] [I73.9]      Priority: Medium     Possible sepsis, was tachypnea and leukocytosis along with intra-abdominal collection possibly abscess, IV antibiotics started on admission, vascular surgery and infectious disease was consulted. Aortic aneurysm status postrepair, further evaluation and surgical reevaluation per vascular surgery.   Anemia mild, no immediate need for transfusion  Generalized weakness, likely due to above monitor closely  Hematuria, follow-up as outpatient repeat urine analysis per primary care physician and might need to follow-up with urology will defer decision to primary care physician  Essential hypertension, uncontrolled, p.o. medication  Peripheral arterial disease, vascular surgery consulted      Diet: ADULT DIET;  Regular  Code Status: Full Code      Neville Koch MD

## 2023-02-09 NOTE — PROGRESS NOTES
0945- Morning assessments and vital signs complete. Scheduled medications given as prescribed. PRN morphine given per patient request for 7/10 abdominal pain. Medication effective in decreasing pain per patient. IV ATB infusing at this time. Patient ambulated independently to restroom. Call light is within reach, bed is in lowest position.  is at bedside, update of care given to patient. 1233-Call received from Michelle/VADIM informing that patient will have drain placed tomorrow. NPO order needed to be placed for after midnight. Notified  via secure message.

## 2023-02-09 NOTE — ACP (ADVANCE CARE PLANNING)
Advance Care Planning     Advance Care Planning Activator (Inpatient)  Conversation Note      Date of ACP Conversation: 2/9/2023     Conversation Conducted with: Patient with Decision Making Capacity    ACP Activator: Jj Conroy RN    Health Care Decision Maker:     Current Designated Health Care Decision Maker:     Primary Decision Maker: Shila Sellers - [phone]    Secondary Decision Maker: Joyce Nieves - Brother/Sister - [phone]    Today we documented desired Decision Maker(s), who is (are) NOT Legal Next [facility]. ACP documents are required for decision maker authority. Care Preferences    Ventilation: \"If you were in your present state of health and suddenly became very ill and were unable to breathe on your own, what would your preference be about the use of a ventilator (breathing machine) if it were available to you? \"      Would the patient desire the use of ventilator (breathing machine)?: yes    \"If your health worsens and it becomes clear that your chance of recovery is unlikely, what would your preference be about the use of a ventilator (breathing machine) if it were available to you? \"     Would the patient desire the use of ventilator (breathing machine)?: No      Resuscitation  \"CPR works best to restart the heart when there is a sudden event, like a heart attack, in someone who is otherwise healthy. Unfortunately, CPR does not typically restart the heart for people who have serious health conditions or who are very sick. \"    \"In the event your heart stopped as a result of an underlying serious health condition, would you want attempts to be made to restart your heart (answer \"yes\" for attempt to resuscitate) or would you prefer a natural death (answer \"no\" for do not attempt to resuscitate)? \" yes       [] Yes   [] No   Educated Patient / Elfredia Latin regarding differences between Advance Directives and portable DNR orders.     Length of ACP Conversation in minutes:  4 min    Conversation Outcomes:  [x] ACP discussion completed  [] Existing advance directive reviewed with patient; no changes to patient's previously recorded wishes  [] New Advance Directive completed  [] Portable Do Not Rescitate prepared for Provider review and signature  [] POLST/POST/MOLST/MOST prepared for Provider review and signature      Follow-up plan:    [] Schedule follow-up conversation to continue planning  [x] Referred individual to Provider for additional questions/concerns, order placed for Spiritual Services to make friend alternate decision maker.    [] Advised patient/agent/surrogate to review completed ACP document and update if needed with changes in condition, patient preferences or care setting    [x] This note routed to one or more involved healthcare providers        Opal Weiss RN, BSN,   [phone]  Electronically signed by Opal Weiss RN on 2/9/2023 at 9:51 AM

## 2023-02-09 NOTE — PROGRESS NOTES
Drain ordered for repeat drainage of psoas abscess. Patient has not been NPO. Ate breakfast and lunch. NPO after midnight, will plan for tomorrow.      Electronically signed by Luis Carlos Rice RN on 2/9/2023 at 1:47 PM

## 2023-02-09 NOTE — CONSULTS
Clinical Pharmacy Note  Vancomycin Consult    Krissy Khan is a 81 y.o. male ordered Vancomycin for sepsis of unknown origin (likely psoas abscess); consult received from Dr. Cecilia Sumner to manage therapy. Also receiving cefepime. Allergies:  Morphine     Temp max:  Temp (24hrs), Av.5 °F (36.9 °C), Min:98 °F (36.7 °C), Max:99.6 °F (37.6 °C)      Recent Labs     23  1853 23  0847 23  0848   WBC 11.9* 10.6 10.1         Recent Labs     23  1853 23  0847 23  0849   BUN 17 14 12   CREATININE 1.1 1.0 1.0           Intake/Output Summary (Last 24 hours) at 2023 0946  Last data filed at 2023 9947  Gross per 24 hour   Intake --   Output 1300 ml   Net -1300 ml       Culture Results:  MRSA swab ordered  Body fluid/ anaerobic cultures in process   Blood - NGTD     Ht Readings from Last 1 Encounters:   23 6' 2\" (1.88 m)          Wt Readings from Last 1 Encounters:   23 188 lb 11.4 oz (85.6 kg)         Estimated Creatinine Clearance: 80 mL/min (based on SCr of 1 mg/dL). Assessment/Plan:  Day # 3 of vancomycin. Vancomycin 1250 mg IV every 18 hours ordered. Goal -600  Predicted     Dr Fiorella Grijalva has been consulted  Increase to 1500 mg q18h. This regimen yields a projected AUC of 506   Level ordered for  on 2/10/23       Thank you for the consult.    Magaly Rangel, [facility]   2023 9:46 AM

## 2023-02-09 NOTE — CARE COORDINATION
Case Management Assessment  Initial Evaluation    Date/Time of Evaluation: 2/9/2023 9:54 AM  Assessment Completed by: Tanja Espitia RN    If patient is discharged prior to next notation, then this note serves as note for discharge by case management. Patient Name: El Valente                   YOB: 1950  Diagnosis: CRP elevated [R79.82]  Psoas abscess, left ([address] [K68.12]  Leukocytosis, unspecified type [D72.829]                   Date / Time: 2/7/2023  6:16 PM    Patient Admission Status: Inpatient   Readmission Risk (Low < 19, Mod (19-27), High > 27): Readmission Risk Score: 14.1    Current PCP: Stephanie Garcia MD  PCP verified by CM? Yes    Chart Reviewed: Yes      History Provided by: Patient  Patient Orientation: Alert and Oriented, Person, Place, Situation, Self    Patient Cognition: Alert    Hospitalization in the last 30 days (Readmission):  No    If yes, Readmission Assessment in CM Navigator will be completed. Advance Directives:      Code Status: Full Code   Patient's Primary Decision Maker is: Named in [address] [place] [address] (order placed for Spiritual Services as pt does not have paperwork for non-relative to be alernate decison maker)    Primary Decision Maker: GARFIELD JOSEPH - Other - [phone]    Secondary Decision Maker: Jose Jaime - Brother/Sister - [phone]    Discharge Planning:    Patient lives with: Children Type of Home: Trailer/Mobile Home  Primary Care Giver: Self  Patient Support Systems include: Children, Family Members, Friends/Neighbors   Current Financial resources: Medicare  Current community resources:    Current services prior to admission: Durable Medical Equipment            Current DME: [facility]            Type of Home Care services:  None    ADLS  Prior functional level: Independent in ADLs/IADLs  Current functional level: Independent in ADLs/IADLs    PT AM-PAC:   /24  OT AM-PAC:   /24    Family can provide assistance at DC:  Yes  Would you like Case Management to discuss the discharge plan with any other family members/significant others, and if so, who?  Yes (Susan)  Plans to Return to Present Housing: Yes  Other Identified Issues/Barriers to RETURNING to current housing: na  Potential Assistance needed at discharge: N/A            Potential DME:    Patient expects to discharge to: Trailer/mobile home (3-4 BUD)  Plan for transportation at discharge: Family    Financial    Payor: Froylan Heidi Zhao / Plan: Rashi 1978 / Product Type: *No Product type* /     Does insurance require precert for SNF: Yes    Potential assistance Purchasing Medications: No  Meds-to-Beds request:        [facility] Chirag Degroot [phone] - F [phone]  [address] [place] [address]  Phone: [phone] Fax: [phone]    [facility]pharm[facility] [address] [facility] [address] [phone] - f [phone]  [address] [place] [address]  [address] [facility] [address]  Phone: [phone] Fax: [phone]    [facility] [address] [facility], [address] [place] [address] [phone] [facility] [phone]  [facility] [address]  Phone: [phone] Fax: [phone] [facility] [phone]  [address] [place] [phone]  Phone: [phone] Fax: [phone]      Notes:    Factors facilitating achievement of predicted outcomes: Family support, Friend support, Motivated, Cooperative, and Pleasant    Barriers to discharge: na    Additional Case Management Notes: denies needs, family or friend can take him home, Spiritual Services to see to make friend alternate decision maker    The Plan for Transition of Care is related to the following treatment goals of CRP elevated [R79.82]  Psoas abscess, left ([address] [K68.12]  Leukocytosis, unspecified type [D72.829]    Neil Olivas RN, BSN, Case Manager  [phone]  Electronically signed by Mae Barbosa RN on 2/9/2023 at 9:55 AM

## 2023-02-09 NOTE — ACP (ADVANCE CARE PLANNING)
Advance Care Planning   Advance Care Planning Note  [facility]    Date:  2/7/2023    Received request from patient. Consultation conversation participants:   Patient who understands ACP conversation     Goals of ACP Conversation:  Discuss advance care planning documents  Facilitate a discussion related to patient's goals of care as they align with the patient's values and beliefs. Health Care Decision Makers:      Primary Decision Maker: George Hudson - [phone]    Secondary Decision Maker: Melissa Tilley - Brother/Sister - [phone]    Supplemental (Other) Decision Maker: Neil Rodarte - Brother/Sister - [phone]   Summary:  Completed [facility]    Advance Care Planning Documents (Patient Wishes)  Currently on file:   Healthcare Power of /Advance Directive Appointment of Health Care Agent  Living Will/Advance Directive    Assessment:   Pt is alert and oriented. Pt expressed desire to complete HCPOA & LW. Pt stated he has a son but he doesn't want his son to make healthcare decisions for him. Per patient wishes  facilitated completion of ACP documents. Verified that ACP/Healthcare POA documents are present, complete, & witnessed/notarized as form requires. Verified accuracy of Healthcare Decision Maker(s) contact information. Ensured Decision Makers are designated Primary, Secondary or Supplemental.  {    Interventions:  Provided education on documents for clarity and greater understanding  Discussed and provided education on state decision maker hierarchy  Assisted in the completion of documents according to patient's wishes at this time  Encouraged ongoing ACP conversation with future decision makers and loved ones    Care Preferences Communicated:   No    Outcomes:  ACP Discussion: Completed  New advance directive completed.   Returned original document(s) to patient, as well as copies for distribution to appointed agents  Copy of advance directive given to staff to scan into medical record. Teach Back Method used to verify the patient's and/or Healthcare Decision Maker's understanding of key information in the advance directive documents    Patient / Healthcare Decision Maker Instructions: Follow up with their individual provider to further discussion of health status   Placed a copy of completed ACP documents in patient's physical chart.     Electronically signed by Honor Epley on 2/9/2023 at 11:58 AM.

## 2023-02-09 NOTE — PROGRESS NOTES
Comprehensive Nutrition Assessment    Type and Reason for Visit:  Initial, Positive Nutrition Screen    Nutrition Recommendations/Plan:   Regular diet   Ensure TID     Malnutrition Assessment:  Malnutrition Status: Moderate malnutrition (02/09/23 1429)    Context:  Acute Illness     Findings of the 6 clinical characteristics of malnutrition:  Energy Intake:  Unable to assess  Weight Loss:  Greater than 5% over 1 month     Body Fat Loss:  Mild body fat loss Orbital, Triceps   Muscle Mass Loss:  Mild muscle mass loss Temples (temporalis), Clavicles (pectoralis & deltoids)  Fluid Accumulation:  No significant fluid accumulation     Strength:  Not Performed    Nutrition Assessment:    + Screen for MST 2. Pt presented with abdominal pain, found to have Left Psoas Abscess. Plan for abscess drainage tomorrow. Hx includes recent repair of abdominal aortic aneurysm. Pt vague historian. Reports poor appetite since Jan, meal untouched at bedside. Per EMR, pt has had significant wt loss in last month. Mild wasting noted. Is favorable to ONS. Nutrition Related Findings:    Reviewed labs Wound Type: None       Current Nutrition Intake & Therapies:    Average Meal Intake: 0% (reviewed labs)  Average Supplements Intake: None Ordered  ADULT DIET; Regular    Anthropometric Measures:  Height: 6' 2\" (188 cm)  Ideal Body Weight (IBW): 190 lbs (86 kg)       Current Body Weight: 188 lb (85.3 kg), 98.9 % IBW. Current BMI (kg/m2): 24.1  Usual Body Weight: 202 lb (91.6 kg)  % Weight Change (Calculated): -6.9                    BMI Categories: Normal Weight (BMI 18.5-24. 9)    Estimated Daily Nutrient Needs:  Energy Requirements Based On: Kcal/kg  Weight Used for Energy Requirements: Current  Energy (kcal/day): 1872-0654 kcal (25-30 kcal/kg ABW)  Weight Used for Protein Requirements: Current  Protein (g/day):  gm (1-1.2 gm/kg ABW)  Method Used for Fluid Requirements: 1 ml/kcal  Fluid (ml/day):      Nutrition Diagnosis: Moderate malnutrition related to inadequate protein-energy intake as evidenced by Criteria as identified in malnutrition assessment    Nutrition Interventions:   Food and/or Nutrient Delivery: Continue Current Diet, Continue Oral Nutrition Supplement  Nutrition Education/Counseling: No recommendation at this time  Coordination of Nutrition Care: Continue to monitor while inpatient       Goals:     Goals: PO intake 50% or greater, prior to discharge       Nutrition Monitoring and Evaluation:   Behavioral-Environmental Outcomes: None Identified  Food/Nutrient Intake Outcomes: Food and Nutrient Intake, Supplement Intake  Physical Signs/Symptoms Outcomes: Biochemical Data, Nutrition Focused Physical Findings, Skin, Weight, Hemodynamic Status    Discharge Planning:     Too soon to determine     [place], [address] [place] [address],   Contact: [phone]

## 2023-02-09 NOTE — CONSULTS
Infectious Diseases Inpatient Consult Note      Reason for Consult:  Left Psoas abscess vs fluid collection h/o AAA endoscopic repair on 1/4/23    Requesting Physician:  Afshan Garcia      Primary Care Physician:  Bobo Mcgovern MD    History Obtained From:  Epic and patient      CHIEF COMPLAINT:     Chief Complaint   Patient presents with    Abdominal Pain     Pt reports left sided abdominal pain and left sided flank pain for the last week. Pt alert and oriented at this time and rates pain as a 8/10. HISTORY OF PRESENT ILLNESS:  81 y.o. man with a significant history for peripheral vascular disease, smoking, hypertension recent abdominal aortic aneurysm repair endoscopy on 1/4/23 now admitted to the hospital secondary to left lower abdominal pain and back pain postoperatively was doing well but recently developed severe pain radiating on the left side was evaluated by primary MD he underwent  CTA of the abdominal aorta on 2/7/23 indicating left psoas fluid collection measuring 3.3 x 2.8 cm this collection was abutting the abdominal aorta representing possible fluid collection seroma versus abscess. He does have bilateral iliac limbs reconstruction for peripheral vascular disease in the past the endovascular repair of abdominal lactic aneurysm is intact without any leak. Admission labs indicate WBC 11.9 hemoglobin 12.3 creatinine 1.1 CRP elevated 70 procalcitonin normal.  He underwent interventional radiology guided aspiration of the fluid collection indicated 5 cc of bloody fluid was sent for culture. Blood cultures are obtained thus far negative we are consulted for antibiotic recommendations.   Location : Left lower quadrant pain and back pain     Quality : Aching      Severity : 10/10  Duration : 1 week     Timing : Constant  Context : Recent abdominal aortic aneurysm repair  Modifying factors : None  Associated signs and symptoms: No fever no chills        Past Medical History:    Past Medical History:   Diagnosis Date    Hyperlipidemia     Hypertension     PAD (peripheral artery disease) ([facility])     stenting in left leg        Past Surgical History:    Past Surgical History:   Procedure Laterality Date    ABDOMINAL AORTIC ANEURYSM REPAIR, ENDOVASCULAR N/A 2023    ENDOVASCULAR ABDOMINAL AORTIC ANEURYSM REPAIR, EMBOLIZATION OF RENAL ARTERY performed by Mary Archibald DO at [address]  2023    CT VISCERAL PERCUTANEOUS DRAIN 2023 [facility] CT    FEMORAL ENDARTERECTOMY Right 2023    RIGHT FEMORAL ENDARTERECTOMY WITH PATCH performed by Mary Archibald DO at [address] [facility] Right     rotator cuff    TOE SURGERY Left        Current Medications:    Outpatient Medications Marked as Taking for the 23 encounter [facility]OS[facility] Encounter)   Medication Sig Dispense Refill    lisinopril (PRINIVIL;ZESTRIL) 20 MG tablet Take 20 mg by mouth daily         Allergies:  Morphine    Immunizations :   Immunization History   Administered Date(s) Administered    COVID-19, PFIZER GRAY top, DO NOT Dilute, (age 15 y+), IM, 30 mcg/0.3 mL 2022    COVID-19, PFIZER PURPLE top, DILUTE for use, (age 15 y+), 30mcg/0.3mL 2021, 2021    Influenza Virus Vaccine 2013, 2014, 10/01/2015, 2018    Pneumococcal Conjugate 13-valent (Ryuqxfa90) 2018    Pneumococcal Polysaccharide (Imephudmc91) 2015    Tdap (Boostrix, Adacel) 2011         Social History:     Social History     Tobacco Use    Smoking status: Former     Packs/day: 0.25     Years: 38.00     Pack years: 9.50     Types: Cigarettes     Start date:      Quit date: 1/3/2023     Years since quittin.1    Smokeless tobacco: Never    Tobacco comments:     cutting down as high as 3 pk/d to as low . 25    Vaping Use    Vaping Use: Never used   Substance Use Topics    Alcohol use: Yes     Comment: rarely    Drug use: Never     Social History     Tobacco Use   Smoking Status Former    Packs/day: 0.25    Years: 38.00    Pack years: 9.50    Types: Cigarettes    Start date:     Quit date: 1/3/2023    Years since quittin.1   Smokeless Tobacco Never   Tobacco Comments    cutting down as high as 3 pk/d to as low . 22       Family History   Problem Relation Age of Onset    Cancer Mother         uterine cancer     Other Father         MVA    Cancer Sister         breast    Cancer Brother         brain     Cancer Maternal Aunt         breast    No Known Problems Maternal Grandmother     No Known Problems Maternal Grandfather     No Known Problems Paternal Grandmother     No Known Problems Paternal Grandfather           REVIEW OF SYSTEMS:      Constitutional:  negative for fevers, chills+ , night sweats  Eyes:  negative for blurred vision, eye discharge, visual disturbance   HEENT:  negative for hearing loss, ear drainage,nasal congestion  Respiratory:  negative for cough, shortness of breath or hemoptysis   Cardiovascular:  negative for chest pain, palpitations, syncope  Gastrointestinal:  negative for nausea, vomiting, diarrhea, constipation, abdominal pain  Genitourinary:  negative for frequency, dysuria, urinary incontinence, hematuria  Hematologic/Lymphatic:  negative for easy bruising, bleeding and lymphadenopathy  Allergic/Immunologic:  negative for recurrent infections, angioedema, anaphylaxis   Endocrine:  negative for weight changes, polyuria, polydipsia and polyphagia  Musculoskeletal:  Left lower back pain + and lower abd pain radiation to the left thigh ++ , swelling, decreased range of motion  Integumentary: No rashes, skin lesions  Neurological:  negative for headaches, slurred speech, unilateral weakness  Psychiatric: negative for hallucinations,confusion,agitation.      PHYSICAL EXAM:      Vitals:  T max  99.6   BP (!) 165/86   Pulse 62   Temp 98 °F (36.7 °C) (Oral)   Resp 16   Ht 6' 2\" (1.88 m)   Wt 188 lb 11.4 oz (85.6 kg)   SpO2 96%   BMI 24.23 kg/m² General Appearance: alert,in no acute distress, ++  pallor, no icterus  Poor dentition++    Skin: warm and dry, no rash or erythema  Head: normocephalic and atraumatic  Eyes: pupils equal, round, and reactive to light, conjunctivae normal  ENT: tympanic membrane, external ear and ear canal normal bilaterally, nose without deformity, nasal mucosa and turbinates normal without polyps  Neck: supple and non-tender without mass, no thyromegaly  no cervical lymphadenopathy  Pulmonary/Chest: clear to auscultation bilaterally- no wheezes, rales or rhonchi, normal air movement, no respiratory distress  Cardiovascular: normal rate, regular rhythm, normal S1 and S2, no murmurs, rubs, clicks, or gallops, no carotid bruits  Abdomen: soft, ++ tender, non-distended, normal bowel sounds, no masses or organomegaly  Extremities: no cyanosis, clubbing or edema  Musculoskeletal: normal range of motion, no joint swelling, deformity or tenderness  Integumentary: No rashes, no abnormal skin lesions, no petechiae  Neurologic: reflexes normal and symmetric, no cranial nerve deficit  Psych:  Orientation, sensorium, mood normal   Lines: IV  Left lower quadrant tender+     DATA:    CBC:   Lab Results   Component Value Date    WBC 10.1 02/09/2023    HGB 10.5 (L) 02/09/2023    HCT 30.2 (L) 02/09/2023    MCV 77.5 (L) 02/09/2023     02/09/2023     RENAL:   Lab Results   Component Value Date    CREATININE 1.0 02/08/2023    BUN 14 02/08/2023     02/08/2023    K 3.9 02/08/2023     02/08/2023    CO2 24 02/08/2023     SED RATE: No results found for: SEDRATE  CK: No results found for: CKTOTAL  CRP:   Lab Results   Component Value Date/Time    CRP 70.0 02/07/2023 06:53 PM     Hepatic Function Panel:   Lab Results   Component Value Date/Time    ALKPHOS 111 01/13/2023 11:13 AM    ALT 15 01/13/2023 11:13 AM    AST 14 01/13/2023 11:13 AM    PROT 6.7 01/13/2023 11:13 AM    BILITOT <0.2 01/13/2023 11:13 AM    LABALBU 3.2 02/08/2023 08:47 AM     UA:  Lab Results   Component Value Date/Time    COLORU Yellow 02/07/2023 06:53 PM    COLORU yellow 02/07/2023 02:09 PM    CLARITYU Clear 02/07/2023 06:53 PM    CLARITYU cloudy 02/07/2023 02:09 PM    GLUCOSEU Negative 02/07/2023 06:53 PM    GLUCOSEU negative 02/07/2023 02:09 PM    BILIRUBINUR Negative 02/07/2023 06:53 PM    BILIRUBINUR negative 02/07/2023 02:09 PM    KETUA Negative 02/07/2023 06:53 PM    KETUA negative 02/07/2023 02:09 PM    SPECGRAV 1.088 02/07/2023 06:53 PM    SPECGRAV <=1.005 02/07/2023 02:09 PM    BLOODU LARGE 02/07/2023 06:53 PM    BLOODU large 02/07/2023 02:09 PM    PHUR 5.0 02/07/2023 06:53 PM    PHUR 6.0 02/07/2023 02:09 PM    PROTEINU 30 02/07/2023 06:53 PM    PROTEINU 30 mg /dl 02/07/2023 02:09 PM    UROBILINOGEN 1.0 02/07/2023 06:53 PM    NITRU Negative 02/07/2023 06:53 PM    LEUKOCYTESUR Negative 02/07/2023 06:53 PM    LEUKOCYTESUR negative 02/07/2023 02:09 PM    LABMICR YES 02/07/2023 06:53 PM    URINETYPE NotGiven 02/07/2023 06:53 PM      Urine Microscopic:   Lab Results   Component Value Date/Time    BACTERIA None Seen 02/07/2023 06:53 PM    HYALCAST 0 02/07/2023 06:53 PM    WBCUA 1 02/07/2023 06:53 PM    RBCUA 8 02/07/2023 06:53 PM    EPIU 1 02/07/2023 06:53 PM     Urine Reflex to Culture:   Lab Results   Component Value Date/Time    URRFLXCULT Not Indicated 02/07/2023 06:53 PM     CRP  70     pROCAL 0.06     Wbc  11.9       MICRO: cultures reviewed and updated by me     Time       MRSA DNA Probe, Nasal [[id number]] Collected: 02/09/23 0900   Order Status: Sent Specimen: Nares Updated: 02/09/23 0911   Culture, Blood 2 [[id number]] Collected: 02/07/23 2212   Order Status: Completed Specimen: Blood Updated: 02/08/23 2315    Culture, Blood 2 No Growth to date. Any change in status will be called.    Narrative:     ORDER#: [id number]                          ORDERED BY: Selina Kelly   SOURCE: Blood                              COLLECTED:  02/07/23 22:12   ANTIBIOTICS AT KELSI.:                      RECEIVED :  02/07/23 22:25   If child <=2 yrs old please draw pediatric bottle. ~Blood Culture #2   Culture, Blood 1 [[id number]] Collected: 02/07/23 1853   Order Status: Completed Specimen: Blood Updated: 02/08/23 2115    Blood Culture, Routine No Growth to date. Any change in status will be called. Narrative:     ORDER#: [id number]                          ORDERED BY: Alana Huertas   SOURCE: Blood                              COLLECTED:  02/07/23 18:53   ANTIBIOTICS AT KELSI.:                      RECEIVED :  02/07/23 19:04   If child <=2 yrs old please draw pediatric bottle. ~Blood Culture 1   Culture, Anaerobic [[id number]] Collected: 02/08/23 1601   Order Status: No result Updated: 02/08/23 1818   Culture, Body Fluid [[id number]] Collected: 02/08/23 1601   Order Status: Sent Specimen: Body Fluid Updated: 02/08/23 1801     Blood Culture:   Lab Results   Component Value Date/Time    [facility]  02/07/2023 06:53 PM     No Growth to date. Any change in status will be called. BLOODCULT2  02/07/2023 10:12 PM     No Growth to date. Any change in status will be called. Viral Culture:    No results found for: COVID19  Urine Culture: No results for input(s): LABURIN in the last 72 hours.     Scheduled Meds:   vancomycin  1,250 mg IntraVENous Q18H    vancomycin (VANCOCIN) intermittent dosing (placeholder)   Other RX Placeholder    atorvastatin  20 mg Oral Daily    metoprolol succinate  25 mg Oral Daily    tamsulosin  0.4 mg Oral Daily    amLODIPine  5 mg Oral Daily    sodium chloride flush  5-40 mL IntraVENous 2 times per day    [Held by provider] enoxaparin  40 mg SubCUTAneous Daily    cefepime  2,000 mg IntraVENous Q8H       Continuous Infusions:   sodium chloride 25 mL/hr at 02/09/23 0916       PRN Meds:  sodium chloride flush, sodium chloride, acetaminophen **OR** acetaminophen, morphine **OR** morphine, guaiFENesin-dextromethorphan    Imaging:   CT DRAINAGE VISCERAL PERCUTANEOUS Final Result   Successful CT-guided aspiration of a psoas collection with removal of 5 cc of   old dark serous bloody material sent for analysis as above. CT GUIDED NEEDLE PLACEMENT   Final Result   Successful CT-guided aspiration of a psoas collection with removal of 5 cc of   old dark serous bloody material sent for analysis as above. CT abd/pelvis from 2/7/23       Impression   1. Successful endovascular repair of the abdominal aortic aneurysm. AP   diameter of the abdominal aorta is currently down to 5.5 cm from 6.1 cm   previously. 2. No evidence of endoleaks. 3. Left psoas collection measures 3.3 x 2.8 cm up from 1.4 x 1.7 cm   previously. This collection abuts the aorta and there is concern for   possible infection. 4. Disease at the origins of renal arteries especially on the right and there   is suspicion of a likely infarct in the right kidney. 5. Diverticulosis but no acute diverticulitis. RECOMMENDATIONS:   1. Aspiration of the left psoas fluid collection to rule out abscess. 2. For management of fusiform AAA:   Note: Recommend Vascular consultation if a fusiform AAA enlarges by >0.5 cm   in 6 months or >1 cm in 1 year or for a saccular AAA of any size. References: Nelly Rocher Radiol 2013; 88(76):729-085; J Vasc Surg. 2018; 67:2-77     All pertinent images and reports for the current Hospitalization were reviewed by me.     IMPRESSION:    Patient Active Problem List   Diagnosis    Benign prostatic hyperplasia (BPH) with urinary urgency    Essential hypertension    Hypercholesteremia    Smoker    Abdominal aortic aneurysm (AAA) greater than 5.0 cm in diameter in female    Inflammatory abdominal aortic aneurysm    Aneurysm of infrarenal abdominal aorta    Status post endovascular aneurysm repair (EVAR)    PVD (peripheral vascular disease) (HCC)    Generalized abdominal pain    Smoking    Abdominal pain    Psoas abscess, left (HCC)     S/p Endovascular AAA repair emergent on 1/4/23   Endograft is patent no endo leak  Left Psoas fluid collection  3.3  x 2.8  cm' abuts Aorta  H/o PVD and vascular stent lower extremity   ESR, CRP elevation  Some systemic symptoms with Chills and low  grade fevers? Poor dentition   Smoking +  Blood cx -ve  S/p IR aspiration of Left PSOAS FLUID and it was bloody? Fluid cx in process      Given the presentation some concern for the infectious process but also could be old blood from his prior AAA leak ? As this was see in the past on the previous CTA but was smaller - IR aspiration of the fluid completed and results pending     ESR, CRP elevation is concerning but can be seen in Hematoma or inflammatory process     There is a plan for aspiration and possible drain placement tomorrow will re send cultures again         Labs, Microbiology, Radiology and pertinent results from current hospitalization and care every where were reviewed by me as a part of the consultation. PLAN :  Cont IV Vancomycin x 1500 mg q 18 hrs  Cont IV Cefepime x 2 gm Q 12 hr  Add IV Flagyl x 500 mg q 8 hrs  Trend ESR, CRP  Resend Fluid for cultures Aerobic, Anaerobic, Fungal and AFB in AM    bLOOD cx in process  May arrange WBC scan depending on the fluid results  If infection confirmed or not able to rule out may have to continue  Long course of IV abx due to risk and morbidity with not treating     Discussed with patient/Family and Nursing   Risk of Complications/Morbidity: High      Illness(es)/ Infection present that pose threat to bodily function. There is potential for severe exacerbation of infection/side effects of treatment. Therapy requires intensive monitoring for antimicrobial agent toxicity. Thanks for allowing me to participate in your patient's care please call me with any questions or concerns.     Dr. Yamileth Sam MD  [address] [place] [address] Physician  Phone: [phone]   Fax : [phone]

## 2023-02-09 NOTE — PLAN OF CARE
Problem: Pain  Goal: Verbalizes/displays adequate comfort level or baseline comfort level  Outcome: Progressing     Problem: Safety - Adult  Goal: Free from fall injury  2/9/2023 0142 by Domenico Hudson RN  Outcome: Progressing  2/9/2023 0141 by Domenico Hudson RN  Outcome: Progressing     Problem: Discharge Planning  Goal: Discharge to home or other facility with appropriate resources  2/9/2023 0142 by Domenico Hudson RN  Outcome: Progressing  2/9/2023 0141 by Domenico Hudson RN  Outcome: Progressing     Problem: Chronic Conditions and Co-morbidities  Goal: Patient's chronic conditions and co-morbidity symptoms are monitored and maintained or improved  Outcome: Progressing

## 2023-02-09 NOTE — CARE COORDINATION
Unsure of dc needs but may need IVAB at dc.    Talked to pt re: all options; home with IVAB & either having HHC or coming in daily to the infusion center or PO abx. ID consult pending.    Gave pt HHC list just in case IVAB are needed & he chooses to do home IVAB.     Call out to Michela with [employer] to verify benefits, vm left asking for return call.    Callie Bowers RN, BSN,   [phone]  Electronically signed by Callie Bowers RN on 2/9/2023 at 10:52 AM     Talked to Michela with [employer] & gave needed information to verify coverage for IVAB; she will f/u with team tomorrow.    Callie Bowers RN, BSN,   [phone]  Electronically signed by Callie Bowers RN on 2/9/2023 at 2:25 PM

## 2023-02-10 ENCOUNTER — APPOINTMENT (OUTPATIENT)
Dept: CT IMAGING | Age: 73
DRG: 871 | End: 2023-02-10
Payer: MEDICARE

## 2023-02-10 LAB
ALBUMIN SERPL-MCNC: 3 G/DL (ref 3.4–5)
ANION GAP SERPL CALCULATED.3IONS-SCNC: 12 MMOL/L (ref 3–16)
BASOPHILS ABSOLUTE: 0.1 K/UL (ref 0–0.2)
BASOPHILS RELATIVE PERCENT: 0.5 %
BUN BLDV-MCNC: 10 MG/DL (ref 7–20)
CALCIUM SERPL-MCNC: 9 MG/DL (ref 8.3–10.6)
CHLORIDE BLD-SCNC: 100 MMOL/L (ref 99–110)
CO2: 21 MMOL/L (ref 21–32)
CREAT SERPL-MCNC: 0.9 MG/DL (ref 0.8–1.3)
EOSINOPHILS ABSOLUTE: 0.5 K/UL (ref 0–0.6)
EOSINOPHILS RELATIVE PERCENT: 4.7 %
GFR SERPL CREATININE-BSD FRML MDRD: >60 ML/MIN/{1.73_M2}
GLUCOSE BLD-MCNC: 89 MG/DL (ref 70–99)
HCT VFR BLD CALC: 30.9 % (ref 40.5–52.5)
HEMOGLOBIN: 10.5 G/DL (ref 13.5–17.5)
LYMPHOCYTES ABSOLUTE: 1.5 K/UL (ref 1–5.1)
LYMPHOCYTES RELATIVE PERCENT: 14.6 %
MAGNESIUM: 1.9 MG/DL (ref 1.8–2.4)
MCH RBC QN AUTO: 26.7 PG (ref 26–34)
MCHC RBC AUTO-ENTMCNC: 34 G/DL (ref 31–36)
MCV RBC AUTO: 78.5 FL (ref 80–100)
MONOCYTES ABSOLUTE: 1 K/UL (ref 0–1.3)
MONOCYTES RELATIVE PERCENT: 10 %
MRSA SCREEN RT-PCR: NORMAL
NEUTROPHILS ABSOLUTE: 7.2 K/UL (ref 1.7–7.7)
NEUTROPHILS RELATIVE PERCENT: 70.2 %
PDW BLD-RTO: 15.7 % (ref 12.4–15.4)
PHOSPHORUS: 2.9 MG/DL (ref 2.5–4.9)
PLATELET # BLD: 295 K/UL (ref 135–450)
PMV BLD AUTO: 8.5 FL (ref 5–10.5)
POTASSIUM SERPL-SCNC: 3.3 MMOL/L (ref 3.5–5.1)
RBC # BLD: 3.94 M/UL (ref 4.2–5.9)
SODIUM BLD-SCNC: 133 MMOL/L (ref 136–145)
VANCOMYCIN TROUGH: 14.5 UG/ML (ref 10–20)
WBC # BLD: 10.3 K/UL (ref 4–11)

## 2023-02-10 PROCEDURE — 80202 ASSAY OF VANCOMYCIN: CPT

## 2023-02-10 PROCEDURE — 36415 COLL VENOUS BLD VENIPUNCTURE: CPT

## 2023-02-10 PROCEDURE — 99233 SBSQ HOSP IP/OBS HIGH 50: CPT | Performed by: INTERNAL MEDICINE

## 2023-02-10 PROCEDURE — 6360000002 HC RX W HCPCS: Performed by: STUDENT IN AN ORGANIZED HEALTH CARE EDUCATION/TRAINING PROGRAM

## 2023-02-10 PROCEDURE — 1200000000 HC SEMI PRIVATE

## 2023-02-10 PROCEDURE — 83735 ASSAY OF MAGNESIUM: CPT

## 2023-02-10 PROCEDURE — 6360000002 HC RX W HCPCS: Performed by: INTERNAL MEDICINE

## 2023-02-10 PROCEDURE — 87116 MYCOBACTERIA CULTURE: CPT

## 2023-02-10 PROCEDURE — 6370000000 HC RX 637 (ALT 250 FOR IP): Performed by: INTERNAL MEDICINE

## 2023-02-10 PROCEDURE — 87070 CULTURE OTHR SPECIMN AEROBIC: CPT

## 2023-02-10 PROCEDURE — 80069 RENAL FUNCTION PANEL: CPT

## 2023-02-10 PROCEDURE — 2580000003 HC RX 258: Performed by: INTERNAL MEDICINE

## 2023-02-10 PROCEDURE — 2580000003 HC RX 258: Performed by: STUDENT IN AN ORGANIZED HEALTH CARE EDUCATION/TRAINING PROGRAM

## 2023-02-10 PROCEDURE — 2500000003 HC RX 250 WO HCPCS: Performed by: INTERNAL MEDICINE

## 2023-02-10 PROCEDURE — 94760 N-INVAS EAR/PLS OXIMETRY 1: CPT

## 2023-02-10 PROCEDURE — 87205 SMEAR GRAM STAIN: CPT

## 2023-02-10 PROCEDURE — 77012 CT SCAN FOR NEEDLE BIOPSY: CPT

## 2023-02-10 PROCEDURE — 99151 MOD SED SAME PHYS/QHP <5 YRS: CPT

## 2023-02-10 PROCEDURE — 87075 CULTR BACTERIA EXCEPT BLOOD: CPT

## 2023-02-10 PROCEDURE — 87102 FUNGUS ISOLATION CULTURE: CPT

## 2023-02-10 PROCEDURE — 0K9P30Z DRAINAGE OF LEFT HIP MUSCLE WITH DRAINAGE DEVICE, PERCUTANEOUS APPROACH: ICD-10-PCS | Performed by: STUDENT IN AN ORGANIZED HEALTH CARE EDUCATION/TRAINING PROGRAM

## 2023-02-10 PROCEDURE — 6370000000 HC RX 637 (ALT 250 FOR IP): Performed by: STUDENT IN AN ORGANIZED HEALTH CARE EDUCATION/TRAINING PROGRAM

## 2023-02-10 PROCEDURE — 87206 SMEAR FLUORESCENT/ACID STAI: CPT

## 2023-02-10 PROCEDURE — 85025 COMPLETE CBC W/AUTO DIFF WBC: CPT

## 2023-02-10 RX ORDER — POTASSIUM CHLORIDE 750 MG/1
40 TABLET, FILM COATED, EXTENDED RELEASE ORAL ONCE
Status: COMPLETED | OUTPATIENT
Start: 2023-02-10 | End: 2023-02-10

## 2023-02-10 RX ORDER — FENTANYL CITRATE 50 UG/ML
INJECTION, SOLUTION INTRAMUSCULAR; INTRAVENOUS DAILY PRN
Status: COMPLETED | OUTPATIENT
Start: 2023-02-10 | End: 2023-02-10

## 2023-02-10 RX ORDER — MIDAZOLAM HYDROCHLORIDE 1 MG/ML
INJECTION INTRAMUSCULAR; INTRAVENOUS DAILY PRN
Status: COMPLETED | OUTPATIENT
Start: 2023-02-10 | End: 2023-02-10

## 2023-02-10 RX ADMIN — METOPROLOL SUCCINATE 25 MG: 25 TABLET, EXTENDED RELEASE ORAL at 08:49

## 2023-02-10 RX ADMIN — CEFEPIME 2000 MG: 2 INJECTION, POWDER, FOR SOLUTION INTRAVENOUS at 12:51

## 2023-02-10 RX ADMIN — VANCOMYCIN HYDROCHLORIDE 1500 MG: 1.5 INJECTION, POWDER, LYOPHILIZED, FOR SOLUTION INTRAVENOUS at 04:28

## 2023-02-10 RX ADMIN — Medication 10 ML: at 20:32

## 2023-02-10 RX ADMIN — MIDAZOLAM 0.5 MG: 1 INJECTION INTRAMUSCULAR; INTRAVENOUS at 12:02

## 2023-02-10 RX ADMIN — CEFEPIME 2000 MG: 2 INJECTION, POWDER, FOR SOLUTION INTRAVENOUS at 20:36

## 2023-02-10 RX ADMIN — MIDAZOLAM 0.5 MG: 1 INJECTION INTRAMUSCULAR; INTRAVENOUS at 11:53

## 2023-02-10 RX ADMIN — FENTANYL CITRATE 25 MCG: 50 INJECTION INTRAMUSCULAR; INTRAVENOUS at 11:58

## 2023-02-10 RX ADMIN — ATORVASTATIN CALCIUM 20 MG: 20 TABLET, FILM COATED ORAL at 08:49

## 2023-02-10 RX ADMIN — ACETAMINOPHEN 650 MG: 325 TABLET ORAL at 08:49

## 2023-02-10 RX ADMIN — CEFEPIME 2000 MG: 2 INJECTION, POWDER, FOR SOLUTION INTRAVENOUS at 06:14

## 2023-02-10 RX ADMIN — METRONIDAZOLE 500 MG: 500 INJECTION, SOLUTION INTRAVENOUS at 17:34

## 2023-02-10 RX ADMIN — MIDAZOLAM 1 MG: 1 INJECTION INTRAMUSCULAR; INTRAVENOUS at 11:48

## 2023-02-10 RX ADMIN — POTASSIUM CHLORIDE 40 MEQ: 750 TABLET, FILM COATED, EXTENDED RELEASE ORAL at 09:38

## 2023-02-10 RX ADMIN — METRONIDAZOLE 500 MG: 500 INJECTION, SOLUTION INTRAVENOUS at 01:39

## 2023-02-10 RX ADMIN — TAMSULOSIN HYDROCHLORIDE 0.4 MG: 0.4 CAPSULE ORAL at 08:49

## 2023-02-10 RX ADMIN — AMLODIPINE BESYLATE 5 MG: 5 TABLET ORAL at 08:49

## 2023-02-10 RX ADMIN — METRONIDAZOLE 500 MG: 500 INJECTION, SOLUTION INTRAVENOUS at 10:49

## 2023-02-10 RX ADMIN — MORPHINE SULFATE 4 MG: 4 INJECTION, SOLUTION INTRAMUSCULAR; INTRAVENOUS at 12:53

## 2023-02-10 RX ADMIN — FENTANYL CITRATE 50 MCG: 50 INJECTION INTRAMUSCULAR; INTRAVENOUS at 11:47

## 2023-02-10 RX ADMIN — MORPHINE SULFATE 4 MG: 4 INJECTION, SOLUTION INTRAMUSCULAR; INTRAVENOUS at 20:31

## 2023-02-10 RX ADMIN — FENTANYL CITRATE 25 MCG: 50 INJECTION INTRAMUSCULAR; INTRAVENOUS at 11:53

## 2023-02-10 RX ADMIN — MORPHINE SULFATE 4 MG: 4 INJECTION, SOLUTION INTRAMUSCULAR; INTRAVENOUS at 08:47

## 2023-02-10 RX ADMIN — Medication 10 ML: at 08:49

## 2023-02-10 ASSESSMENT — PAIN SCALES - GENERAL
PAINLEVEL_OUTOF10: 10
PAINLEVEL_OUTOF10: 8
PAINLEVEL_OUTOF10: 7

## 2023-02-10 ASSESSMENT — PAIN DESCRIPTION - LOCATION
LOCATION: BACK;FLANK
LOCATION: BACK;FLANK

## 2023-02-10 ASSESSMENT — PAIN DESCRIPTION - DESCRIPTORS
DESCRIPTORS: DISCOMFORT;ACHING
DESCRIPTORS: ACHING;STABBING

## 2023-02-10 ASSESSMENT — PAIN DESCRIPTION - ORIENTATION
ORIENTATION: LEFT
ORIENTATION: LEFT

## 2023-02-10 NOTE — BRIEF OP NOTE
Brief Postoperative Note    Apoorva Mead Sr  YOB: 1950  [id number]      Pre-operative Diagnosis: Left iliopsoas fluid collection    Post-operative Diagnosis: Same    Procedure: CT guided percutaneous drainage catheter placement    Anesthesia: Local and Moderate Sedation    Surgeons/Assistants: Tim Reyez DO    Estimated Blood Loss: less than 50     Complications: None    Specimens: Was Obtained: 10 mL of dark bloody fluid    Findings:   Successful placement of a 10-F locking pigtail drainage catheter into a left iliopsoas collection with immediate return of dark bloody fluid. 10 mL was removed during the procedure.        Tim Reyez DO  2/10/2023, 12:08 PM  Interventional Radiology  [phone] ([phone])

## 2023-02-10 NOTE — PROGRESS NOTES
[facility]) Vascular Surgery Progress Note      Chief Complaint:   Chief Complaint   Patient presents with    Abdominal Pain     Pt reports left sided abdominal pain and left sided flank pain for the last week. Pt alert and oriented at this time and rates pain as a 8/10.        : psoas abscess    Jess Claros is a 81 y.o. male patient. Subjective  S/p drain placement  Feels better  Hungry     1. Psoas abscess, left (HCC)    2. Leukocytosis, unspecified type    3. CRP elevated      Past Medical History:   Diagnosis Date    Hyperlipidemia     Hypertension     PAD (peripheral artery disease) (HCC)     stenting in left leg      No past surgical history pertinent negatives on file. Scheduled Meds:   vancomycin  1,500 mg IntraVENous Q18H    metroNIDAZOLE  500 mg IntraVENous Q8H    vancomycin (VANCOCIN) intermittent dosing (placeholder)   Other RX Placeholder    atorvastatin  20 mg Oral Daily    metoprolol succinate  25 mg Oral Daily    tamsulosin  0.4 mg Oral Daily    amLODIPine  5 mg Oral Daily    sodium chloride flush  5-40 mL IntraVENous 2 times per day    [Held by provider] enoxaparin  40 mg SubCUTAneous Daily    cefepime  2,000 mg IntraVENous Q8H     Continuous Infusions:   sodium chloride Stopped (02/10/23 0139)     PRN Meds:sodium chloride flush, sodium chloride, acetaminophen **OR** acetaminophen, morphine **OR** morphine, guaiFENesin-dextromethorphan    Active Problems:    PVD (peripheral vascular disease) (HCC)    Abdominal pain    Psoas abscess, left (HCC)    Moderate malnutrition (HCC)    CRP elevated    Leukocytosis    Elevated erythrocyte sedimentation rate    Poor dentition    History of abdominal aortic aneurysm (AAA) repair  Resolved Problems:    * No resolved hospital problems. *    Blood pressure (!) 147/88, pulse 77, temperature 97.8 °F (36.6 °C), temperature source Oral, resp. rate 18, height 6' 2\" (1.88 m), weight 186 lb 1.1 oz (84.4 kg), SpO2 96 %.       Objective:  Vital signs (most recent): Blood pressure (!) 147/88, pulse 77, temperature 97.8 °F (36.6 °C), temperature source Oral, resp. rate 18, height 6' 2\" (1.88 m), weight 186 lb 1.1 oz (84.4 kg), SpO2 96 %. General appearance: Comfortable. Lungs:  Normal effort. Heart: Normal rate. Wound:  Objective wound description: drain with brown turbid fluid. Extremities: There is normal range of motion.         Assessment & Plan    Drain successfully placed, sincerely appreciate IR's efforts to drain this fluid  Await cultures  Discussed with Dr. Isabella Arshad  Will follow results and plan for drainage with IV antibiotics and interval scans to monitor  Hold off on explant plans at this time      Darrius Apodaca DO, FACS, FSVS, [address] [facility] Vascular and Endovascular Surgery

## 2023-02-10 NOTE — PROGRESS NOTES
Infectious Diseases Inpatient Progress  Note        CHIEF COMPLAINT:     Left Psoas fluid collection   AAA repair  ESR ELEVATION   CRP elevation   Left side back pain   PVD          HISTORY OF PRESENT ILLNESS:  81 y.o. man with a significant history for peripheral vascular disease, smoking, hypertension recent abdominal aortic aneurysm repair endoscopy on 1/4/23 now admitted to the hospital secondary to left lower abdominal pain and back pain postoperatively was doing well but recently developed severe pain radiating on the left side was evaluated by primary MD he underwent  CTA of the abdominal aorta on 2/7/23 indicating left psoas fluid collection measuring 3.3 x 2.8 cm this collection was abutting the abdominal aorta representing possible fluid collection seroma versus abscess. He does have bilateral iliac limbs reconstruction for peripheral vascular disease in the past the endovascular repair of abdominal lactic aneurysm is intact without any leak. Admission labs indicate WBC 11.9 hemoglobin 12.3 creatinine 1.1 CRP elevated 70 procalcitonin normal.  He underwent interventional radiology guided aspiration of the fluid collection indicated 5 cc of bloody fluid was sent for culture. Blood cultures are obtained thus far negative we are consulted for antibiotic recommendations.   Location : Left lower quadrant pain and back pain     Quality : Aching      Severity : 10/10  Duration : 1 week     Timing : Constant  Context : Recent abdominal aortic aneurysm repair  Modifying factors : None  Associated signs and symptoms: No fever no chills    INTERVAL HISTORY : s/p IR drain placement lEFT Psoas fluid collection and cx in process pain going down and CRISTOFER drain in place and Tolerating IV abx ok OPAT d/w pt Blood cx in process            Past Medical History:    Past Medical History:   Diagnosis Date    Hyperlipidemia     Hypertension     PAD (peripheral artery disease) ([address]     stenting in left leg        Past Surgical History:    Past Surgical History:   Procedure Laterality Date    ABDOMINAL AORTIC ANEURYSM REPAIR, ENDOVASCULAR N/A 2023    ENDOVASCULAR ABDOMINAL AORTIC ANEURYSM REPAIR, EMBOLIZATION OF RENAL ARTERY performed by Areli Slaughter DO at [address]  2023    CT VISCERAL PERCUTANEOUS DRAIN 2023 [facility]    FEMORAL ENDARTERECTOMY Right 2023    RIGHT FEMORAL ENDARTERECTOMY WITH PATCH performed by Areli Slaughter DO at [address] Right     rotator cuff    TOE SURGERY Left        Current Medications:    Outpatient Medications Marked as Taking for the 23 encounter [facility] Encounter)   Medication Sig Dispense Refill    lisinopril (PRINIVIL;ZESTRIL) 20 MG tablet Take 20 mg by mouth daily         Allergies:  Morphine    Immunizations :   Immunization History   Administered Date(s) Administered    COVID-19, PFIZER GRAY top, DO NOT Dilute, (age 15 y+), IM, 30 mcg/0.3 mL 2022    COVID-19, PFIZER PURPLE top, DILUTE for use, (age 15 y+), 30mcg/0.3mL 2021, 2021    Influenza Virus Vaccine 2013, 2014, 10/01/2015, 2018    Pneumococcal Conjugate 13-valent (Nyrwnzz50) 2018    Pneumococcal Polysaccharide (Pbgcfpurw32) 2015    Tdap (Boostrix, Adacel) 2011         Social History:     Social History     Tobacco Use    Smoking status: Former     Packs/day: 0.25     Years: 38.00     Pack years: 9.50     Types: Cigarettes     Start date:      Quit date: 1/3/2023     Years since quittin.1    Smokeless tobacco: Never    Tobacco comments:     cutting down as high as 3 pk/d to as low . 25    Vaping Use    Vaping Use: Never used   Substance Use Topics    Alcohol use: Yes     Comment: rarely    Drug use: Never     Social History     Tobacco Use   Smoking Status Former    Packs/day: 0.25    Years: 38.00    Pack years: 9.50    Types: Cigarettes    Start date:     Quit date: 1/3/2023    Years since quittin.1   Smokeless Tobacco Never   Tobacco Comments    cutting down as high as 3 pk/d to as low . 22       Family History   Problem Relation Age of Onset    Cancer Mother         uterine cancer     Other Father         MVA    Cancer Sister         breast    Cancer Brother         brain     Cancer Maternal Aunt         breast    No Known Problems Maternal Grandmother     No Known Problems Maternal Grandfather     No Known Problems Paternal Grandmother     No Known Problems Paternal Grandfather           REVIEW OF SYSTEMS:      Constitutional:  negative for fevers, chills+ , night sweats  Eyes:  negative for blurred vision, eye discharge, visual disturbance   HEENT:  negative for hearing loss, ear drainage,nasal congestion  Respiratory:  negative for cough, shortness of breath or hemoptysis   Cardiovascular:  negative for chest pain, palpitations, syncope  Gastrointestinal:  negative for nausea, vomiting, diarrhea, constipation, abdominal pain  Genitourinary:  negative for frequency, dysuria, urinary incontinence, hematuria  Hematologic/Lymphatic:  negative for easy bruising, bleeding and lymphadenopathy  Allergic/Immunologic:  negative for recurrent infections, angioedema, anaphylaxis   Endocrine:  negative for weight changes, polyuria, polydipsia and polyphagia  Musculoskeletal:  Left lower back pain + and lower abd pain radiation to the left thigh ++ , swelling, decreased range of motion  Integumentary: No rashes, skin lesions  Neurological:  negative for headaches, slurred speech, unilateral weakness  Psychiatric: negative for hallucinations,confusion,agitation.      PHYSICAL EXAM:      Vitals:  T max  99.6   BP (!) 174/84   Pulse 61   Temp 97.9 °F (36.6 °C) (Oral)   Resp 18   Ht 6' 2\" (1.88 m)   Wt 186 lb 1.1 oz (84.4 kg)   SpO2 93%   BMI 23.89 kg/m²     General Appearance: alert,in no acute distress, ++  pallor, no icterus  Poor dentition++    Skin: warm and dry, no rash or erythema  Head: normocephalic and atraumatic  Eyes: pupils equal, round, and reactive to light, conjunctivae normal  ENT: tympanic membrane, external ear and ear canal normal bilaterally, nose without deformity, nasal mucosa and turbinates normal without polyps  Neck: supple and non-tender without mass, no thyromegaly  no cervical lymphadenopathy  Pulmonary/Chest: clear to auscultation bilaterally- no wheezes, rales or rhonchi, normal air movement, no respiratory distress  Cardiovascular: normal rate, regular rhythm, normal S1 and S2, no murmurs, rubs, clicks, or gallops, no carotid bruits  Abdomen: soft, ++ tender, non-distended, normal bowel sounds, no masses or organomegaly  Extremities: no cyanosis, clubbing or edema  Musculoskeletal: normal range of motion, no joint swelling, deformity or tenderness  Integumentary: No rashes, no abnormal skin lesions, no petechiae  Neurologic: reflexes normal and symmetric, no cranial nerve deficit  Psych:  Orientation, sensorium, mood normal   Lines: IV  Left lower quadrant tender+   CRISTOFER Drain in place Blood tinged fluid+     DATA:    CBC:   Lab Results   Component Value Date    WBC 10.3 02/10/2023    HGB 10.5 (L) 02/10/2023    HCT 30.9 (L) 02/10/2023    MCV 78.5 (L) 02/10/2023     02/10/2023     RENAL:   Lab Results   Component Value Date    CREATININE 0.9 02/10/2023    BUN 10 02/10/2023     (L) 02/10/2023    K 3.3 (L) 02/10/2023     02/10/2023    CO2 21 02/10/2023     SED RATE:   Lab Results   Component Value Date/Time    SEDRATE 103 02/09/2023 08:48 AM     CK: No results found for: CKTOTAL  CRP:   Lab Results   Component Value Date/Time    CRP 97.7 02/09/2023 08:48 AM     Hepatic Function Panel:   Lab Results   Component Value Date/Time    ALKPHOS 111 01/13/2023 11:13 AM    ALT 15 01/13/2023 11:13 AM    AST 14 01/13/2023 11:13 AM    PROT 6.7 01/13/2023 11:13 AM    BILITOT <0.2 01/13/2023 11:13 AM    LABALBU 3.0 02/10/2023 06:25 AM     UA:  Lab Results   Component Value Date/Time    COLORU Yellow 02/07/2023 06:53 PM    COLORU yellow 02/07/2023 02:09 PM    CLARITYU Clear 02/07/2023 06:53 PM    CLARITYU cloudy 02/07/2023 02:09 PM    GLUCOSEU Negative 02/07/2023 06:53 PM    GLUCOSEU negative 02/07/2023 02:09 PM    BILIRUBINUR Negative 02/07/2023 06:53 PM    BILIRUBINUR negative 02/07/2023 02:09 PM    KETUA Negative 02/07/2023 06:53 PM    KETUA negative 02/07/2023 02:09 PM    SPECGRAV 1.088 02/07/2023 06:53 PM    SPECGRAV <=1.005 02/07/2023 02:09 PM    BLOODU LARGE 02/07/2023 06:53 PM    BLOODU large 02/07/2023 02:09 PM    PHUR 5.0 02/07/2023 06:53 PM    PHUR 6.0 02/07/2023 02:09 PM    PROTEINU 30 02/07/2023 06:53 PM    PROTEINU 30 mg /dl 02/07/2023 02:09 PM    UROBILINOGEN 1.0 02/07/2023 06:53 PM    NITRU Negative 02/07/2023 06:53 PM    LEUKOCYTESUR Negative 02/07/2023 06:53 PM    LEUKOCYTESUR negative 02/07/2023 02:09 PM    LABMICR YES 02/07/2023 06:53 PM    URINETYPE NotGiven 02/07/2023 06:53 PM      Urine Microscopic:   Lab Results   Component Value Date/Time    BACTERIA None Seen 02/07/2023 06:53 PM    HYALCAST 0 02/07/2023 06:53 PM    WBCUA 1 02/07/2023 06:53 PM    RBCUA 8 02/07/2023 06:53 PM    EPIU 1 02/07/2023 06:53 PM     Urine Reflex to Culture:   Lab Results   Component Value Date/Time    URRFLXCULT Not Indicated 02/07/2023 06:53 PM     CRP  70     pROCAL 0.06     Wbc  11.9       MICRO: cultures reviewed and updated by me     Time       MRSA DNA Probe, Nasal [[id number]] Collected: 02/09/23 0900   Order Status: Sent Specimen: Nares Updated: 02/09/23 0911   Culture, Blood 2 [[id number]] Collected: 02/07/23 2212   Order Status: Completed Specimen: Blood Updated: 02/08/23 2315    Culture, Blood 2 No Growth to date. Any change in status will be called.    Narrative:     ORDER#: [id number]                          ORDERED BY: Medhat Sanabria   SOURCE: Blood                              COLLECTED:  02/07/23 22:12   ANTIBIOTICS AT KELSI.:                      RECEIVED : 02/07/23 22:25   If child <=2 yrs old please draw pediatric bottle. ~Blood Culture #2   Culture, Blood 1 [[id number]] Collected: 02/07/23 1853   Order Status: Completed Specimen: Blood Updated: 02/08/23 2115    Blood Culture, Routine No Growth to date. Any change in status will be called. Narrative:     ORDER#: [id number]                          ORDERED BY: Reji Pagan   SOURCE: Blood                              COLLECTED:  02/07/23 18:53   ANTIBIOTICS AT KELSI.:                      RECEIVED :  02/07/23 19:04   If child <=2 yrs old please draw pediatric bottle. ~Blood Culture 1   Culture, Anaerobic [[id number]] Collected: 02/08/23 1601   Order Status: No result Updated: 02/08/23 1818   Culture, Body Fluid [[id number]] Collected: 02/08/23 1601   Order Status: Sent Specimen: Body Fluid Updated: 02/08/23 1801     Blood Culture:   Lab Results   Component Value Date/Time    [facility]  02/07/2023 06:53 PM     No Growth to date. Any change in status will be called. BLOODCULT2  02/07/2023 10:12 PM     No Growth to date. Any change in status will be called. Culture, Body Fluid [[id number]] Collected: 02/08/23 1601   Order Status: Completed Specimen: Body Fluid Updated: 02/10/23 6302    Body Fluid Culture, Sterile --    No growth to date   No growth 36 to 48 hours     Gram Stain Result No Epithelial Cells seen   4+ WBC's (Polymorphonuclear)   No organisms seen    Narrative:     ORDER#: [id number]                          ORDERED BY: JOHNNIE Patel   SOURCE: Fluid psoas                        COLLECTED:  02/08/23 16:01   ANTIBIOTICS AT KELSI.:                      RECEIVED :  02/08/23 18:00   Culture, Anaerobic and Aerobic [[id number]]        Viral Culture:    No results found for: COVID19  Urine Culture: No results for input(s): LABURIN in the last 72 hours.     Scheduled Meds:   potassium chloride  40 mEq Oral Once    vancomycin  1,500 mg IntraVENous Q18H    metroNIDAZOLE  500 mg IntraVENous Q8H    vancomycin (VANCOCIN) intermittent dosing (placeholder)   Other RX Placeholder    atorvastatin  20 mg Oral Daily    metoprolol succinate  25 mg Oral Daily    tamsulosin  0.4 mg Oral Daily    amLODIPine  5 mg Oral Daily    sodium chloride flush  5-40 mL IntraVENous 2 times per day    [Held by provider] enoxaparin  40 mg SubCUTAneous Daily    cefepime  2,000 mg IntraVENous Q8H       Continuous Infusions:   sodium chloride Stopped (02/10/23 0139)       PRN Meds:  sodium chloride flush, sodium chloride, acetaminophen **OR** acetaminophen, morphine **OR** morphine, guaiFENesin-dextromethorphan    Imaging:   CT DRAINAGE VISCERAL PERCUTANEOUS   Final Result   Successful CT-guided aspiration of a psoas collection with removal of 5 cc of   old dark serous bloody material sent for analysis as above. CT GUIDED NEEDLE PLACEMENT   Final Result   Successful CT-guided aspiration of a psoas collection with removal of 5 cc of   old dark serous bloody material sent for analysis as above. CT ABSCESS DRAINAGE W CATH PLACEMENT S&I    (Results Pending)       CT abd/pelvis from 2/7/23       Impression   1. Successful endovascular repair of the abdominal aortic aneurysm. AP   diameter of the abdominal aorta is currently down to 5.5 cm from 6.1 cm   previously. 2. No evidence of endoleaks. 3. Left psoas collection measures 3.3 x 2.8 cm up from 1.4 x 1.7 cm   previously. This collection abuts the aorta and there is concern for   possible infection. 4. Disease at the origins of renal arteries especially on the right and there   is suspicion of a likely infarct in the right kidney. 5. Diverticulosis but no acute diverticulitis. RECOMMENDATIONS:   1. Aspiration of the left psoas fluid collection to rule out abscess. 2. For management of fusiform AAA:   Note: Recommend Vascular consultation if a fusiform AAA enlarges by >0.5 cm   in 6 months or >1 cm in 1 year or for a saccular AAA of any size.    References: J Am Estuardo Radiol 2013; 25(79):715-109; J Vasc Surg. 2018; 67:2-77     All pertinent images and reports for the current Hospitalization were reviewed by me. IMPRESSION:    Patient Active Problem List   Diagnosis    Benign prostatic hyperplasia (BPH) with urinary urgency    Essential hypertension    Hypercholesteremia    Smoker    Abdominal aortic aneurysm (AAA) greater than 5.0 cm in diameter in female    Inflammatory abdominal aortic aneurysm    Aneurysm of infrarenal abdominal aorta    Status post endovascular aneurysm repair (EVAR)    PVD (peripheral vascular disease) (HCC)    Generalized abdominal pain    Smoking    Abdominal pain    Psoas abscess, left (HCC)    Moderate malnutrition (HCC)    CRP elevated    Leukocytosis    Elevated erythrocyte sedimentation rate    Poor dentition    History of abdominal aortic aneurysm (AAA) repair     S/p Endovascular AAA repair emergent on 1/4/23   Endograft is patent no endo leak  Left Psoas fluid collection  3.3  x 2.8  cm' abuts Aorta  H/o PVD and vascular stent lower extremity   ESR, CRP elevation  Some systemic symptoms with Chills and low  grade fevers? Poor dentition   Smoking +  Blood cx -ve  S/p IR aspiration of Left PSOAS FLUID and it was bloody? Fluid cx in process      Given the presentation some concern for the infectious process but also could be old blood from his prior AAA leak ? As this was see in the past on the previous CTA but was smaller - IR aspiration of the fluid completed and results pending     ESR, CRP elevation is concerning but can be seen in Hematoma or inflammatory process     S/p IR aspiration and  drain placement in the fluid collection and its not clear pus but blood tinged fluid and Cx in process     OPAT d/w pt and may trend ESR, CRP for response        Labs, Microbiology, Radiology and pertinent results from current hospitalization and care every where were reviewed by me as a part of the consultation.     PLAN :  Cont IV Vancomycin x 1500 mg q 18 hrs  Cont IV Cefepime x 2 gm Q 12 hr  Cont IV Flagyl x 500 mg q 8 hrs  Trend ESR, CRP check Monday   Resend Fluid for cultures Aerobic, Anaerobic, Fungal and AFB in Process   bLOOD cx in process NGTD  If infection confirmed or not able to rule out may have to continue  Long course of IV abx due to risk and morbidity with not treating   D/w  and we will follow on the cx   he may not be ready till Monday until we have all the information to make a decision on IV abx  -  pt understood     Discussed with patient/Family and Nursing   Risk of Complications/Morbidity: High      Illness(es)/ Infection present that pose threat to bodily function. There is potential for severe exacerbation of infection/side effects of treatment. Therapy requires intensive monitoring for antimicrobial agent toxicity. Thanks for allowing me to participate in your patient's care please call me with any questions or concerns.     Dr. Catherine Estrada MD  [facility]  Phone: [phone]   Fax : [phone]

## 2023-02-10 NOTE — PLAN OF CARE
Problem: Pain  Goal: Verbalizes/displays adequate comfort level or baseline comfort level  Outcome: Progressing     Problem: Safety - Adult  Goal: Free from fall injury  Outcome: Progressing     Problem: Discharge Planning  Goal: Discharge to home or other facility with appropriate resources  Outcome: Progressing  Flowsheets (Taken 2/9/2023 2100)  Discharge to home or other facility with appropriate resources: Identify barriers to discharge with patient and caregiver     Problem: Chronic Conditions and Co-morbidities  Goal: Patient's chronic conditions and co-morbidity symptoms are monitored and maintained or improved  Outcome: Progressing  Flowsheets (Taken 2/9/2023 2100)  Care Plan - Patient's Chronic Conditions and Co-Morbidity Symptoms are Monitored and Maintained or Improved: Monitor and assess patient's chronic conditions and comorbid symptoms for stability, deterioration, or improvement     Problem: Nutrition Deficit:  Goal: Optimize nutritional status  Outcome: Progressing

## 2023-02-10 NOTE — PROGRESS NOTES
Hospitalist Progress Note      PCP: Tootie Cole MD    Date of Admission: 2/7/2023        Subjective: Feels okay but having some back pain, no fever chills or abdominal pain. Medications:  Reviewed    Infusion Medications    sodium chloride Stopped (02/10/23 0139)     Scheduled Medications    vancomycin  1,500 mg IntraVENous Q18H    metroNIDAZOLE  500 mg IntraVENous Q8H    vancomycin (VANCOCIN) intermittent dosing (placeholder)   Other RX Placeholder    atorvastatin  20 mg Oral Daily    metoprolol succinate  25 mg Oral Daily    tamsulosin  0.4 mg Oral Daily    amLODIPine  5 mg Oral Daily    sodium chloride flush  5-40 mL IntraVENous 2 times per day    [Held by provider] enoxaparin  40 mg SubCUTAneous Daily    cefepime  2,000 mg IntraVENous Q8H     PRN Meds: sodium chloride flush, sodium chloride, acetaminophen **OR** acetaminophen, morphine **OR** morphine, guaiFENesin-dextromethorphan      Intake/Output Summary (Last 24 hours) at 2/10/2023 0630  Last data filed at 2/10/2023 0528  Gross per 24 hour   Intake 1798.48 ml   Output 800 ml   Net 998.48 ml       Physical Exam Performed:    BP (!) 161/85   Pulse 72   Temp 98.9 °F (37.2 °C) (Oral)   Resp 18   Ht 6' 2\" (1.88 m)   Wt 186 lb 1.1 oz (84.4 kg)   SpO2 99%   BMI 23.89 kg/m²     General appearance: No apparent distress  Neck: Supple  Respiratory:  Normal respiratory effort. Clear to auscultation, bilaterally without Rales/Wheezes/Rhonchi. Cardiovascular: Regular rate and rhythm with normal S1/S2 without murmurs, rubs or gallops. Abdomen: Soft, non-tender, non-distended   Musculoskeletal: No clubbing, cyanosis  Skin: Skin color, texture, turgor normal.  No rashes or lesions.   Neurologic:  No focal weakness   Psychiatric: Alert and oriented  Capillary Refill: Brisk, 3 seconds, normal   Peripheral Pulses: +2 palpable, equal bilaterally       Labs:   Recent Labs     02/07/23  1853 02/08/23  0847 02/09/23  0848   WBC 11.9* 10.6 10.1   HGB 12.3* 11.4* 10.5*   HCT 37.4* 33.4* 30.2*    310 302     Recent Labs     02/07/23  1853 02/08/23  0847 02/09/23  0849   * 137 135*   K 3.6 3.9 3.4*   CL 96* 102 101   CO2 24 24 22   BUN 17 14 12   CREATININE 1.1 1.0 1.0   CALCIUM 9.7 9.2 8.8   PHOS  --  3.0 2.9     No results for input(s): AST, ALT, BILIDIR, BILITOT, ALKPHOS in the last 72 hours. Recent Labs     02/08/23  1544 02/09/23  0848   INR 1.20* 1.21*     No results for input(s): Nichole Estevan in the last 72 hours. Urinalysis:      Lab Results   Component Value Date/Time    NITRU Negative 02/07/2023 06:53 PM    WBCUA 1 02/07/2023 06:53 PM    BACTERIA None Seen 02/07/2023 06:53 PM    RBCUA 8 02/07/2023 06:53 PM    BLOODU LARGE 02/07/2023 06:53 PM    BLOODU large 02/07/2023 02:09 PM    SPECGRAV 1.088 02/07/2023 06:53 PM    SPECGRAV <=1.005 02/07/2023 02:09 PM    GLUCOSEU Negative 02/07/2023 06:53 PM    GLUCOSEU negative 02/07/2023 02:09 PM       Radiology:  CT DRAINAGE VISCERAL PERCUTANEOUS   Final Result   Successful CT-guided aspiration of a psoas collection with removal of 5 cc of   old dark serous bloody material sent for analysis as above. CT GUIDED NEEDLE PLACEMENT   Final Result   Successful CT-guided aspiration of a psoas collection with removal of 5 cc of   old dark serous bloody material sent for analysis as above.          CT ABSCESS DRAINAGE W CATH PLACEMENT S&I    (Results Pending)       IP CONSULT TO VASCULAR SURGERY  IP CONSULT TO VASCULAR SURGERY  PHARMACY TO DOSE VANCOMYCIN  IP CONSULT TO INFECTIOUS DISEASES  IP CONSULT TO SPIRITUAL SERVICES    Assessment/Plan:    Active Hospital Problems    Diagnosis     Moderate malnutrition ([facility] [E44.0]      Priority: Medium    CRP elevated [R79.82]      Priority: Medium    Leukocytosis [D72.829]      Priority: Medium    Elevated erythrocyte sedimentation rate [R70.0]      Priority: Medium    Poor dentition [K08.9]      Priority: Medium    History of abdominal aortic aneurysm (AAA) repair [id number]      Priority: Medium    Abdominal pain [R10.9]      Priority: Medium    Psoas abscess, left ([facility] [K68.12]      Priority: Medium    PVD [facility]peripheral vascular disease) ([facility] [I73.9]      Priority: Medium     Possible sepsis, with tachypnea and leukocytosis along with intra-abdominal collection probably abscess, present on admission, IV antibiotics started on admission, vascular surgery and infectious disease consulted, Flagyl added to vancomycin and cefepime.   Might need WBC scan per ID, will follow recommendations  Left psoas fluid collection likely abscess as above  Aortic aneurysm status postrepair, vascular surgery following  Anemia mild, no immediate need for transfusion  Generalized weakness, likely due to above monitor closely  Hematuria, follow-up as outpatient repeat urine analysis per primary care physician and might need to follow-up with urology will defer decision to primary care physician  Essential hypertension, uncontrolled, p.o. medication  Peripheral arterial disease, vascular surgery consulted      Diet: Diet NPO Exceptions are: Sips of Water with Meds  Code Status: Full Code    Hieu Santiago MD

## 2023-02-10 NOTE — PLAN OF CARE
Problem: Pain  Goal: Verbalizes/displays adequate comfort level or baseline comfort level  2/10/2023 1207 by Elvia Dolan RN  Outcome: Progressing  Note: Pt educated on using pain scale. Pt given PRN pain medication per Dr orders see Eitan Harvey. Pt agreeable to pain management. Problem: Safety - Adult  Goal: Free from fall injury  2/10/2023 1207 by Elvia Dolan RN  Outcome: Progressing  Note: Fall precautions in place. Bed in lowest position, wheels locked, call light in reach, non slip socks on. Pt educated on using call light for assistance. Pt agreeable.

## 2023-02-10 NOTE — PRE SEDATION
Sedation Pre-Procedure Note    Patient Name: Violeta David Sr   YOB: 1950  Room/Bed: Z0U-8999/8402-85  Medical Record Number: [id number]  Date: 2/10/2023   Time: 11:36 AM       Indication:  Left iliopsoas abscess, drainage catheter placement    Consent: I have discussed with the patient and/or the patient representative the indication, alternatives, and the possible risks and/or complications of the planned procedure and the anesthesia methods. The patient and/or patient representative appear to understand and agree to proceed. Vital Signs:   Vitals:    02/10/23 1047   BP: (!) 143/86   Pulse: 74   Resp: 18   Temp: 98.1 °F (36.7 °C)   SpO2: 97%       Past Medical History:   has a past medical history of Hyperlipidemia, Hypertension, and PAD (peripheral artery disease) ([facility] [address] Past Surgical History:   has a past surgical history that includes Toe Surgery (Left); shoulder surgery (Right); Cholecystectomy; AAA repair, endovascular (N/A, 1/4/2023); Femoral Endarterectomy (Right, 1/4/2023); and CT DRAINAGE VISCERAL PERCUTANEOUS (2/8/2023). Medications:   Scheduled Meds:    vancomycin  1,500 mg IntraVENous Q18H    metroNIDAZOLE  500 mg IntraVENous Q8H    vancomycin (VANCOCIN) intermittent dosing (placeholder)   Other RX Placeholder    atorvastatin  20 mg Oral Daily    metoprolol succinate  25 mg Oral Daily    tamsulosin  0.4 mg Oral Daily    amLODIPine  5 mg Oral Daily    sodium chloride flush  5-40 mL IntraVENous 2 times per day    [Held by provider] enoxaparin  40 mg SubCUTAneous Daily    cefepime  2,000 mg IntraVENous Q8H     Continuous Infusions:    sodium chloride Stopped (02/10/23 0139)     PRN Meds: sodium chloride flush, sodium chloride, acetaminophen **OR** acetaminophen, morphine **OR** morphine, guaiFENesin-dextromethorphan  Home Meds:   Prior to Admission medications    Medication Sig Start Date End Date Taking?  Authorizing Provider   lisinopril (PRINIVIL;ZESTRIL) 20 MG tablet Take 20 mg by mouth daily   Yes Historical Provider, MD   metoprolol succinate (TOPROL XL) 25 MG extended release tablet Take 1 tablet by mouth daily 1/31/23   LELE Boyd NP   amLODIPine (NORVASC) 5 MG tablet Take 1 tablet by mouth daily 1/22/23   Say Robledo MD   tiZANidine (ZANAFLEX) 2 MG tablet TAKE 1 TABLET BY MOUTH THREE TIMES DAILY AS NEEDED FOR LOWER BACK PAIN  Patient taking differently: Take 2 mg by mouth 3 times daily as needed (lower back pain) 1/19/23   LELE Boyd NP   atorvastatin (LIPITOR) 20 MG tablet TAKE 1 TABLET BY MOUTH DAILY 10/17/22   Say Robledo MD   tamsulosin (FLOMAX) 0.4 MG capsule TAKE 1 CAPSULE BY MOUTH DAILY 10/17/22   Say Robledo MD   albuterol sulfate HFA (VENTOLIN HFA) 108 (90 Base) MCG/ACT inhaler Inhale 2 puffs into the lungs 4 times daily as needed for Wheezing 4/18/22   Say Robledo MD   vitamin B-12 (CYANOCOBALAMIN) 500 MCG tablet Take 1 tablet by mouth daily 8/20/20   Say Robledo MD     Coumadin Use Last 7 Days:  no  Antiplatelet drug therapy use last 7 days: no  Other anticoagulant use last 7 days: no  Additional Medication Information:  N/A      Pre-Sedation Documentation and Exam:   I have reviewed the patient's history and review of systems.     Mallampati Airway Assessment:  normal, Mallampati Class III - (soft palate & base of uvula are visible)    Prior History of Anesthesia Complications:   none    ASA Classification:  Class 2 - A normal healthy patient with mild systemic disease    Sedation/ Anesthesia Plan:   intravenous sedation    Medications Planned:   midazolam (Versed) intravenously and fentanyl intravenously    Patient is an appropriate candidate for plan of sedation: yes    Electronically signed by Cait Pierson DO on 2/10/2023 at 11:36 AM

## 2023-02-11 LAB
ALBUMIN SERPL-MCNC: 2.9 G/DL (ref 3.4–5)
ANION GAP SERPL CALCULATED.3IONS-SCNC: 12 MMOL/L (ref 3–16)
BASOPHILS ABSOLUTE: 0.1 K/UL (ref 0–0.2)
BASOPHILS RELATIVE PERCENT: 0.8 %
BLOOD CULTURE, ROUTINE: NORMAL
BODY FLUID CULTURE, STERILE: NORMAL
BUN BLDV-MCNC: 12 MG/DL (ref 7–20)
CALCIUM SERPL-MCNC: 9.1 MG/DL (ref 8.3–10.6)
CHLORIDE BLD-SCNC: 100 MMOL/L (ref 99–110)
CO2: 21 MMOL/L (ref 21–32)
CREAT SERPL-MCNC: 1 MG/DL (ref 0.8–1.3)
CULTURE, BLOOD 2: NORMAL
EOSINOPHILS ABSOLUTE: 0.2 K/UL (ref 0–0.6)
EOSINOPHILS RELATIVE PERCENT: 2.3 %
GFR SERPL CREATININE-BSD FRML MDRD: >60 ML/MIN/{1.73_M2}
GLUCOSE BLD-MCNC: 112 MG/DL (ref 70–99)
GRAM STAIN RESULT: NORMAL
HCT VFR BLD CALC: 31.7 % (ref 40.5–52.5)
HEMOGLOBIN: 11 G/DL (ref 13.5–17.5)
LYMPHOCYTES ABSOLUTE: 1.4 K/UL (ref 1–5.1)
LYMPHOCYTES RELATIVE PERCENT: 13.4 %
MAGNESIUM: 1.9 MG/DL (ref 1.8–2.4)
MCH RBC QN AUTO: 26.7 PG (ref 26–34)
MCHC RBC AUTO-ENTMCNC: 34.7 G/DL (ref 31–36)
MCV RBC AUTO: 76.9 FL (ref 80–100)
MONOCYTES ABSOLUTE: 0.9 K/UL (ref 0–1.3)
MONOCYTES RELATIVE PERCENT: 8.6 %
NEUTROPHILS ABSOLUTE: 8 K/UL (ref 1.7–7.7)
NEUTROPHILS RELATIVE PERCENT: 74.9 %
PDW BLD-RTO: 16.2 % (ref 12.4–15.4)
PHOSPHORUS: 3.4 MG/DL (ref 2.5–4.9)
PLATELET # BLD: 319 K/UL (ref 135–450)
PMV BLD AUTO: 7.9 FL (ref 5–10.5)
POTASSIUM SERPL-SCNC: 3.6 MMOL/L (ref 3.5–5.1)
RBC # BLD: 4.12 M/UL (ref 4.2–5.9)
SODIUM BLD-SCNC: 133 MMOL/L (ref 136–145)
WBC # BLD: 10.7 K/UL (ref 4–11)

## 2023-02-11 PROCEDURE — 83735 ASSAY OF MAGNESIUM: CPT

## 2023-02-11 PROCEDURE — 6360000002 HC RX W HCPCS: Performed by: INTERNAL MEDICINE

## 2023-02-11 PROCEDURE — 1200000000 HC SEMI PRIVATE

## 2023-02-11 PROCEDURE — 99024 POSTOP FOLLOW-UP VISIT: CPT | Performed by: SURGERY

## 2023-02-11 PROCEDURE — 6370000000 HC RX 637 (ALT 250 FOR IP): Performed by: SURGERY

## 2023-02-11 PROCEDURE — 6370000000 HC RX 637 (ALT 250 FOR IP): Performed by: STUDENT IN AN ORGANIZED HEALTH CARE EDUCATION/TRAINING PROGRAM

## 2023-02-11 PROCEDURE — 80069 RENAL FUNCTION PANEL: CPT

## 2023-02-11 PROCEDURE — 85025 COMPLETE CBC W/AUTO DIFF WBC: CPT

## 2023-02-11 PROCEDURE — 36415 COLL VENOUS BLD VENIPUNCTURE: CPT

## 2023-02-11 PROCEDURE — 6360000002 HC RX W HCPCS: Performed by: STUDENT IN AN ORGANIZED HEALTH CARE EDUCATION/TRAINING PROGRAM

## 2023-02-11 PROCEDURE — 2500000003 HC RX 250 WO HCPCS: Performed by: INTERNAL MEDICINE

## 2023-02-11 PROCEDURE — 2580000003 HC RX 258: Performed by: STUDENT IN AN ORGANIZED HEALTH CARE EDUCATION/TRAINING PROGRAM

## 2023-02-11 PROCEDURE — 2580000003 HC RX 258: Performed by: INTERNAL MEDICINE

## 2023-02-11 RX ORDER — POLYETHYLENE GLYCOL 3350 17 G/17G
17 POWDER, FOR SOLUTION ORAL 2 TIMES DAILY
Status: DISCONTINUED | OUTPATIENT
Start: 2023-02-11 | End: 2023-02-14 | Stop reason: HOSPADM

## 2023-02-11 RX ADMIN — METRONIDAZOLE 500 MG: 500 INJECTION, SOLUTION INTRAVENOUS at 02:42

## 2023-02-11 RX ADMIN — CEFEPIME 2000 MG: 2 INJECTION, POWDER, FOR SOLUTION INTRAVENOUS at 22:07

## 2023-02-11 RX ADMIN — METOPROLOL SUCCINATE 25 MG: 25 TABLET, EXTENDED RELEASE ORAL at 10:28

## 2023-02-11 RX ADMIN — TAMSULOSIN HYDROCHLORIDE 0.4 MG: 0.4 CAPSULE ORAL at 10:28

## 2023-02-11 RX ADMIN — ATORVASTATIN CALCIUM 20 MG: 20 TABLET, FILM COATED ORAL at 10:28

## 2023-02-11 RX ADMIN — MORPHINE SULFATE 2 MG: 2 INJECTION, SOLUTION INTRAMUSCULAR; INTRAVENOUS at 13:01

## 2023-02-11 RX ADMIN — ACETAMINOPHEN 650 MG: 325 TABLET ORAL at 16:13

## 2023-02-11 RX ADMIN — METRONIDAZOLE 500 MG: 500 INJECTION, SOLUTION INTRAVENOUS at 10:33

## 2023-02-11 RX ADMIN — ACETAMINOPHEN 650 MG: 325 TABLET ORAL at 02:51

## 2023-02-11 RX ADMIN — VANCOMYCIN HYDROCHLORIDE 1500 MG: 1.5 INJECTION, POWDER, LYOPHILIZED, FOR SOLUTION INTRAVENOUS at 18:00

## 2023-02-11 RX ADMIN — POLYETHYLENE GLYCOL 3350 17 G: 17 POWDER, FOR SOLUTION ORAL at 17:50

## 2023-02-11 RX ADMIN — CEFEPIME 2000 MG: 2 INJECTION, POWDER, FOR SOLUTION INTRAVENOUS at 05:57

## 2023-02-11 RX ADMIN — MORPHINE SULFATE 4 MG: 4 INJECTION, SOLUTION INTRAMUSCULAR; INTRAVENOUS at 17:00

## 2023-02-11 RX ADMIN — MORPHINE SULFATE 4 MG: 4 INJECTION, SOLUTION INTRAMUSCULAR; INTRAVENOUS at 02:51

## 2023-02-11 RX ADMIN — METRONIDAZOLE 500 MG: 500 INJECTION, SOLUTION INTRAVENOUS at 17:54

## 2023-02-11 RX ADMIN — VANCOMYCIN HYDROCHLORIDE 1500 MG: 1.5 INJECTION, POWDER, LYOPHILIZED, FOR SOLUTION INTRAVENOUS at 01:08

## 2023-02-11 RX ADMIN — Medication 10 ML: at 20:20

## 2023-02-11 RX ADMIN — AMLODIPINE BESYLATE 5 MG: 5 TABLET ORAL at 10:28

## 2023-02-11 RX ADMIN — MORPHINE SULFATE 4 MG: 4 INJECTION, SOLUTION INTRAMUSCULAR; INTRAVENOUS at 22:08

## 2023-02-11 RX ADMIN — CEFEPIME 2000 MG: 2 INJECTION, POWDER, FOR SOLUTION INTRAVENOUS at 13:04

## 2023-02-11 ASSESSMENT — PAIN SCALES - GENERAL
PAINLEVEL_OUTOF10: 5
PAINLEVEL_OUTOF10: 8
PAINLEVEL_OUTOF10: 7
PAINLEVEL_OUTOF10: 9
PAINLEVEL_OUTOF10: 7

## 2023-02-11 ASSESSMENT — PAIN DESCRIPTION - LOCATION
LOCATION: ABDOMEN
LOCATION: FLANK;ABDOMEN

## 2023-02-11 ASSESSMENT — PAIN DESCRIPTION - ORIENTATION
ORIENTATION: LEFT
ORIENTATION: LEFT

## 2023-02-11 NOTE — PROGRESS NOTES
Vascular Surgery Progress Note      SUBJECTIVE:  S/p Perc drainage of Left iliopsoas fluid collection yesterday--approximately 30 cc serosang since yesterday. Cultures--pending. Original drain cx showed NGTD. Poor appetite. Feels constipated. Requesting miralax. OBJECTIVE  Physical  CURRENT VITALS:  BP (!) 147/84   Pulse 67   Temp 97.8 °F (36.6 °C) (Oral)   Resp 16   Ht 6' 2\" (1.88 m)   Wt 185 lb 10 oz (84.2 kg)   SpO2 97%   BMI 23.83 kg/m²   TEMPERATURE:  Current - Temp: 97.8 °F (36.6 °C); Max - Temp  Av.7 °F (37.1 °C)  Min: 97.8 °F (36.6 °C)  Max: 99.6 °F (37.6 °C)    Abdomen soft and nondistended. Minimally tender to deep palpation over the AAA. No pulsatile mass. More tender in the LLQ by no peritoneal sx. Drain in place.       Data  CBC:   Lab Results   Component Value Date/Time    WBC 10.7 2023 06:17 AM    RBC 4.12 2023 06:17 AM    HGB 11.0 2023 06:17 AM    HCT 31.7 2023 06:17 AM    MCV 76.9 2023 06:17 AM    MCH 26.7 2023 06:17 AM    MCHC 34.7 2023 06:17 AM    RDW 16.2 2023 06:17 AM     2023 06:17 AM    MPV 7.9 2023 06:17 AM     Current Medications  Current Facility-Administered Medications: vancomycin (VANCOCIN) 1,500 mg in sodium chloride 0.9 % 250 mL IVPB (ADDAVIAL), 1,500 mg, IntraVENous, Q18H  metronidazole (FLAGYL) 500 mg in 0.9% NaCl 100 mL IVPB premix, 500 mg, IntraVENous, Q8H  vancomycin (VANCOCIN) intermittent dosing (placeholder), , Other, RX Placeholder  atorvastatin (LIPITOR) tablet 20 mg, 20 mg, Oral, Daily  metoprolol succinate (TOPROL XL) extended release tablet 25 mg, 25 mg, Oral, Daily  tamsulosin (FLOMAX) capsule 0.4 mg, 0.4 mg, Oral, Daily  amLODIPine (NORVASC) tablet 5 mg, 5 mg, Oral, Daily  sodium chloride flush 0.9 % injection 5-40 mL, 5-40 mL, IntraVENous, 2 times per day  sodium chloride flush 0.9 % injection 5-40 mL, 5-40 mL, IntraVENous, PRN  0.9 % sodium chloride infusion, , IntraVENous, PRN  [Held by provider] enoxaparin (LOVENOX) injection 40 mg, 40 mg, SubCUTAneous, Daily  acetaminophen (TYLENOL) tablet 650 mg, 650 mg, Oral, Q6H PRN **OR** acetaminophen (TYLENOL) suppository 650 mg, 650 mg, Rectal, Q6H PRN  cefepime (MAXIPIME) 2,000 mg in sodium chloride 0.9 % 50 mL IVPB (mini-bag), 2,000 mg, IntraVENous, Q8H  morphine (PF) injection 2 mg, 2 mg, IntraVENous, Q4H PRN **OR** morphine (PF) injection 4 mg, 4 mg, IntraVENous, Q4H PRN  guaiFENesin-dextromethorphan (ROBITUSSIN DM) 100-10 MG/5ML syrup 5 mL, 5 mL, Oral, Q4H PRN    ASSESSMENT :  Left iliopsoas fluid collection    PLAN:  Pain control. Await cultures. Continue IV Abx for now. Add Miralax.

## 2023-02-11 NOTE — PROGRESS NOTES
Hospitalist Progress Note      PCP: RENÉ KELSEY MD    Date of Admission: 2/7/2023        Subjective: Okay, no chest pain shortness of breath, having minimal left lower quadrant abdominal pain.      Medications:  Reviewed    Infusion Medications    sodium chloride Stopped (02/10/23 0139)     Scheduled Medications    vancomycin  1,500 mg IntraVENous Q18H    metroNIDAZOLE  500 mg IntraVENous Q8H    vancomycin (VANCOCIN) intermittent dosing (placeholder)   Other RX Placeholder    atorvastatin  20 mg Oral Daily    metoprolol succinate  25 mg Oral Daily    tamsulosin  0.4 mg Oral Daily    amLODIPine  5 mg Oral Daily    sodium chloride flush  5-40 mL IntraVENous 2 times per day    [Held by provider] enoxaparin  40 mg SubCUTAneous Daily    cefepime  2,000 mg IntraVENous Q8H     PRN Meds: sodium chloride flush, sodium chloride, acetaminophen **OR** acetaminophen, morphine **OR** morphine, guaiFENesin-dextromethorphan      Intake/Output Summary (Last 24 hours) at 2/11/2023 0843  Last data filed at 2/11/2023 0649  Gross per 24 hour   Intake 698.73 ml   Output 118 ml   Net 580.73 ml       Physical Exam Performed:    BP (!) 147/84   Pulse 61   Temp 97.8 °F (36.6 °C) (Oral)   Resp 16   Ht 6' 2\" (1.88 m)   Wt 185 lb 10 oz (84.2 kg)   SpO2 97%   BMI 23.83 kg/m²     General appearance: No apparent distress  Neck: Supple  Respiratory:  Normal respiratory effort. Clear to auscultation, bilaterally without Rales/Wheezes/Rhonchi.  Cardiovascular: Regular rate and rhythm with normal S1/S2 without murmurs, rubs or gallops.  Abdomen: Soft, non-tender  Musculoskeletal: No clubbing, cyanosis  Skin: Skin color, texture, turgor normal.  No rashes or lesions.  Neurologic:  No focal weakness   Psychiatric: Alert and oriented  Capillary Refill: Brisk, 3 seconds, normal   Peripheral Pulses: +2 palpable, equal bilaterally       Labs:   Recent Labs     02/09/23  0848 02/10/23  0625 02/11/23  0617   WBC 10.1 10.3 10.7   HGB 10.5*  10.5* 11.0*   HCT 30.2* 30.9* 31.7*    295 319     Recent Labs     02/09/23  0849 02/10/23  0625 02/11/23  0617   * 133* 133*   K 3.4* 3.3* 3.6    100 100   CO2 22 21 21   BUN 12 10 12   CREATININE 1.0 0.9 1.0   CALCIUM 8.8 9.0 9.1   PHOS 2.9 2.9 3.4     No results for input(s): AST, ALT, BILIDIR, BILITOT, ALKPHOS in the last 72 hours. Recent Labs     02/08/23  1544 02/09/23  0848   INR 1.20* 1.21*     No results for input(s): Rhae Childes in the last 72 hours. Urinalysis:      Lab Results   Component Value Date/Time    NITRU Negative 02/07/2023 06:53 PM    WBCUA 1 02/07/2023 06:53 PM    BACTERIA None Seen 02/07/2023 06:53 PM    RBCUA 8 02/07/2023 06:53 PM    BLOODU LARGE 02/07/2023 06:53 PM    BLOODU large 02/07/2023 02:09 PM    SPECGRAV 1.088 02/07/2023 06:53 PM    SPECGRAV <=1.005 02/07/2023 02:09 PM    GLUCOSEU Negative 02/07/2023 06:53 PM    GLUCOSEU negative 02/07/2023 02:09 PM       Radiology:  CT ABSCESS DRAINAGE W CATH PLACEMENT S&I   Final Result   Successful CT-guided percutaneous drainage catheter placement into a left   iliopsoas fluid collection via a left flank approach. Patient currently has a 10 [place] locking pigtail drainage catheter within   the collection, placed to bulb suction. CT GUIDED NEEDLE PLACEMENT   Final Result   Successful CT-guided percutaneous drainage catheter placement into a left   iliopsoas fluid collection via a left flank approach. Patient currently has a 10 [place] locking pigtail drainage catheter within   the collection, placed to bulb suction. CT DRAINAGE VISCERAL PERCUTANEOUS   Final Result   Successful CT-guided aspiration of a psoas collection with removal of 5 cc of   old dark serous bloody material sent for analysis as above. CT GUIDED NEEDLE PLACEMENT   Final Result   Successful CT-guided aspiration of a psoas collection with removal of 5 cc of   old dark serous bloody material sent for analysis as above. IP CONSULT TO VASCULAR SURGERY  IP CONSULT TO VASCULAR SURGERY  PHARMACY TO DOSE VANCOMYCIN  IP CONSULT TO INFECTIOUS DISEASES  IP CONSULT TO SPIRITUAL SERVICES    Assessment/Plan:    Active Hospital Problems    Diagnosis     Moderate malnutrition ([facility] [address] [E44.0]      Priority: Medium    CRP elevated [R79.82]      Priority: Medium    Leukocytosis [D72.829]      Priority: Medium    Elevated erythrocyte sedimentation rate [R70.0]      Priority: Medium    Poor dentition [K08.9]      Priority: Medium    History of abdominal aortic aneurysm (AAA) repair [B98.744]      Priority: Medium    Abdominal pain [R10.9]      Priority: Medium    Psoas abscess, left (HCC) [R58.84]      Priority: Medium    PVD (peripheral vascular disease) (HCC) [I73.9]      Priority: Medium       Possible sepsis, with tachypnea and leukocytosis along with intra-abdominal collection probably abscess, present on admission, IV antibiotics started on admission, vascular surgery and infectious disease consulted, Flagyl added to vancomycin and cefepime. Status post drain   Left psoas fluid collection due to abscess, status post drain placement  Aortic aneurysm status postrepair, vascular surgery following  Anemia mild, no immediate need for transfusion  Generalized weakness, likely due to above monitor closely  Hematuria, follow-up as outpatient repeat urine analysis per primary care physician and might need to follow-up with urology will defer decision to primary care physician  Essential hypertension, uncontrolled, p.o. medication  Peripheral arterial disease, vascular surgery consulted    Diet: ADULT DIET;  Regular  Code Status: Full Code      Ami Hernandez MD

## 2023-02-11 NOTE — PLAN OF CARE
Problem: Pain  Goal: Verbalizes/displays adequate comfort level or baseline comfort level  Outcome: Progressing     Problem: Safety - Adult  Goal: Free from fall injury  Outcome: Progressing     Problem: Discharge Planning  Goal: Discharge to home or other facility with appropriate resources  Outcome: Progressing     Problem: Chronic Conditions and Co-morbidities  Goal: Patient's chronic conditions and co-morbidity symptoms are monitored and maintained or improved  Outcome: Progressing     Problem: Nutrition Deficit:  Goal: Optimize nutritional status  Outcome: Progressing

## 2023-02-11 NOTE — PROGRESS NOTES
Pt awake sitting at the bedside. Pt wincing  with deep breaths. This nurse asked pt was he having pain, and pt stated I'm ok as he continued to wince. This nurse asked pt to describe how he's feeling. Pt stated that he felt like something was in there that shouldn't be. This nurse discussed the placement of the CRISTOFER drain and the importance of verbalizing when he felt pain or discomfort. Pt stated he was having pain in his left upper abdomen rating 7/10. Pt medicated per PRN order and heat pack applied. Pt also verbalized not having a BM since Tuesday. Requesting Miralax. Call light and needs in reach.

## 2023-02-11 NOTE — PROGRESS NOTES
Clinical Pharmacy Note  Vancomycin Consult    Mariya Nelson is a 81 y.o. male ordered Vancomycin for sepsis of unknown origin (likely psoas abscess); consult received from Dr. Kamran Johnson to manage therapy. Also receiving cefepime. Allergies:  Morphine     Temp max:  Temp (24hrs), Av.2 °F (36.8 °C), Min:97.8 °F (36.6 °C), Max:99.1 °F (37.3 °C)      Recent Labs     23  0847 23  0848 02/10/23  0625   WBC 10.6 10.1 10.3         Recent Labs     23  0847 23  0849 02/10/23  0625   BUN 14 12 10   CREATININE 1.0 1.0 0.9           Intake/Output Summary (Last 24 hours) at 2/10/2023 2128  Last data filed at 2/10/2023 2039  Gross per 24 hour   Intake 1220.27 ml   Output 308 ml   Net 912.27 ml         Culture Results:  MRSA swab ordered  Body fluid/ anaerobic cultures in process   Blood - NGTD     Ht Readings from Last 1 Encounters:   23 6' 2\" (1.88 m)          Wt Readings from Last 1 Encounters:   02/10/23 186 lb 1.1 oz (84.4 kg)         Estimated Creatinine Clearance: 89 mL/min (based on SCr of 0.9 mg/dL). Assessment/Plan:  Day # 4 of vancomycin. Current regimen: 1500 mg IV every 18 hours  Goal -600  Vancomycin level 14.5  Predicted     Will continue current regimen    Thank you for the consult.    José Manuel Muhammad, AureaD

## 2023-02-12 LAB
AFB SMEAR: NORMAL
ALBUMIN SERPL-MCNC: 3 G/DL (ref 3.4–5)
ANION GAP SERPL CALCULATED.3IONS-SCNC: 13 MMOL/L (ref 3–16)
BASOPHILS ABSOLUTE: 0.1 K/UL (ref 0–0.2)
BASOPHILS RELATIVE PERCENT: 0.7 %
BUN BLDV-MCNC: 13 MG/DL (ref 7–20)
CALCIUM SERPL-MCNC: 9.1 MG/DL (ref 8.3–10.6)
CHLORIDE BLD-SCNC: 100 MMOL/L (ref 99–110)
CO2: 22 MMOL/L (ref 21–32)
CREAT SERPL-MCNC: 0.9 MG/DL (ref 0.8–1.3)
EOSINOPHILS ABSOLUTE: 0.2 K/UL (ref 0–0.6)
EOSINOPHILS RELATIVE PERCENT: 1.5 %
FUNGUS STAIN: NORMAL
GFR SERPL CREATININE-BSD FRML MDRD: >60 ML/MIN/{1.73_M2}
GLUCOSE BLD-MCNC: 103 MG/DL (ref 70–99)
HCT VFR BLD CALC: 32.5 % (ref 40.5–52.5)
HEMOGLOBIN: 10.8 G/DL (ref 13.5–17.5)
LYMPHOCYTES ABSOLUTE: 0.9 K/UL (ref 1–5.1)
LYMPHOCYTES RELATIVE PERCENT: 8.8 %
MAGNESIUM: 1.9 MG/DL (ref 1.8–2.4)
MCH RBC QN AUTO: 26.2 PG (ref 26–34)
MCHC RBC AUTO-ENTMCNC: 33.2 G/DL (ref 31–36)
MCV RBC AUTO: 79.1 FL (ref 80–100)
MONOCYTES ABSOLUTE: 0.8 K/UL (ref 0–1.3)
MONOCYTES RELATIVE PERCENT: 7.5 %
NEUTROPHILS ABSOLUTE: 8.2 K/UL (ref 1.7–7.7)
NEUTROPHILS RELATIVE PERCENT: 81.5 %
PDW BLD-RTO: 16.2 % (ref 12.4–15.4)
PHOSPHORUS: 2.5 MG/DL (ref 2.5–4.9)
PLATELET # BLD: 342 K/UL (ref 135–450)
PMV BLD AUTO: 8 FL (ref 5–10.5)
POTASSIUM SERPL-SCNC: 3.4 MMOL/L (ref 3.5–5.1)
RBC # BLD: 4.11 M/UL (ref 4.2–5.9)
SODIUM BLD-SCNC: 135 MMOL/L (ref 136–145)
VANCOMYCIN TROUGH: 16.1 UG/ML (ref 10–20)
WBC # BLD: 10 K/UL (ref 4–11)

## 2023-02-12 PROCEDURE — 6360000002 HC RX W HCPCS: Performed by: STUDENT IN AN ORGANIZED HEALTH CARE EDUCATION/TRAINING PROGRAM

## 2023-02-12 PROCEDURE — 1200000000 HC SEMI PRIVATE

## 2023-02-12 PROCEDURE — 2580000003 HC RX 258: Performed by: INTERNAL MEDICINE

## 2023-02-12 PROCEDURE — 83735 ASSAY OF MAGNESIUM: CPT

## 2023-02-12 PROCEDURE — 80069 RENAL FUNCTION PANEL: CPT

## 2023-02-12 PROCEDURE — 85025 COMPLETE CBC W/AUTO DIFF WBC: CPT

## 2023-02-12 PROCEDURE — 6360000002 HC RX W HCPCS: Performed by: INTERNAL MEDICINE

## 2023-02-12 PROCEDURE — 6370000000 HC RX 637 (ALT 250 FOR IP): Performed by: SURGERY

## 2023-02-12 PROCEDURE — 2500000003 HC RX 250 WO HCPCS: Performed by: INTERNAL MEDICINE

## 2023-02-12 PROCEDURE — 80202 ASSAY OF VANCOMYCIN: CPT

## 2023-02-12 PROCEDURE — 36415 COLL VENOUS BLD VENIPUNCTURE: CPT

## 2023-02-12 PROCEDURE — 2580000003 HC RX 258: Performed by: STUDENT IN AN ORGANIZED HEALTH CARE EDUCATION/TRAINING PROGRAM

## 2023-02-12 PROCEDURE — 6370000000 HC RX 637 (ALT 250 FOR IP): Performed by: STUDENT IN AN ORGANIZED HEALTH CARE EDUCATION/TRAINING PROGRAM

## 2023-02-12 PROCEDURE — 6370000000 HC RX 637 (ALT 250 FOR IP): Performed by: INTERNAL MEDICINE

## 2023-02-12 RX ORDER — ONDANSETRON 2 MG/ML
4 INJECTION INTRAMUSCULAR; INTRAVENOUS EVERY 6 HOURS PRN
Status: DISCONTINUED | OUTPATIENT
Start: 2023-02-12 | End: 2023-02-14 | Stop reason: HOSPADM

## 2023-02-12 RX ORDER — POTASSIUM CHLORIDE 750 MG/1
20 TABLET, FILM COATED, EXTENDED RELEASE ORAL ONCE
Status: COMPLETED | OUTPATIENT
Start: 2023-02-12 | End: 2023-02-12

## 2023-02-12 RX ADMIN — CEFEPIME 2000 MG: 2 INJECTION, POWDER, FOR SOLUTION INTRAVENOUS at 13:52

## 2023-02-12 RX ADMIN — POLYETHYLENE GLYCOL 3350 17 G: 17 POWDER, FOR SOLUTION ORAL at 08:48

## 2023-02-12 RX ADMIN — CEFEPIME 2000 MG: 2 INJECTION, POWDER, FOR SOLUTION INTRAVENOUS at 06:16

## 2023-02-12 RX ADMIN — POLYETHYLENE GLYCOL 3350 17 G: 17 POWDER, FOR SOLUTION ORAL at 20:35

## 2023-02-12 RX ADMIN — METRONIDAZOLE 500 MG: 500 INJECTION, SOLUTION INTRAVENOUS at 01:09

## 2023-02-12 RX ADMIN — VANCOMYCIN HYDROCHLORIDE 1500 MG: 1.5 INJECTION, POWDER, LYOPHILIZED, FOR SOLUTION INTRAVENOUS at 11:46

## 2023-02-12 RX ADMIN — METRONIDAZOLE 500 MG: 500 INJECTION, SOLUTION INTRAVENOUS at 10:35

## 2023-02-12 RX ADMIN — CEFEPIME 2000 MG: 2 INJECTION, POWDER, FOR SOLUTION INTRAVENOUS at 20:45

## 2023-02-12 RX ADMIN — POTASSIUM CHLORIDE 20 MEQ: 750 TABLET, FILM COATED, EXTENDED RELEASE ORAL at 11:43

## 2023-02-12 RX ADMIN — ONDANSETRON 4 MG: 2 INJECTION INTRAMUSCULAR; INTRAVENOUS at 09:04

## 2023-02-12 RX ADMIN — Medication 10 ML: at 20:36

## 2023-02-12 RX ADMIN — AMLODIPINE BESYLATE 5 MG: 5 TABLET ORAL at 08:48

## 2023-02-12 RX ADMIN — ATORVASTATIN CALCIUM 20 MG: 20 TABLET, FILM COATED ORAL at 08:48

## 2023-02-12 RX ADMIN — METRONIDAZOLE 500 MG: 500 INJECTION, SOLUTION INTRAVENOUS at 18:01

## 2023-02-12 RX ADMIN — TAMSULOSIN HYDROCHLORIDE 0.4 MG: 0.4 CAPSULE ORAL at 08:48

## 2023-02-12 RX ADMIN — METOPROLOL SUCCINATE 25 MG: 25 TABLET, EXTENDED RELEASE ORAL at 08:48

## 2023-02-12 ASSESSMENT — PAIN SCALES - GENERAL
PAINLEVEL_OUTOF10: 0

## 2023-02-12 NOTE — PROGRESS NOTES
Clinical Pharmacy Note  Vancomycin Consult    Michelle Tolbert is a 81 y.o. male ordered vancomycin for sepsis of unknown origin; consult received from Dr. Meagan Rizo to manage therapy. Also receiving cefepime and metronidazole. Allergies:  Morphine     Temp max:  Temp (24hrs), Av.8 °F (36.6 °C), Min:97.5 °F (36.4 °C), Max:98.1 °F (36.7 °C)      Recent Labs     02/10/23  0625 23  0617 23  0703   WBC 10.3 10.7 10.0         Recent Labs     02/10/23  0625 23  0617 23  0703   BUN 10 12 13   CREATININE 0.9 1.0 0.9           Intake/Output Summary (Last 24 hours) at 2023 0405  Last data filed at 2023 6302  Gross per 24 hour   Intake 593.77 ml   Output 1030 ml   Net -436.23 ml         Culture Results:  MRSA swab - MRSA not detected  Body fluid/anaerobic cultures - NGTD  Blood - NGTD     Ht Readings from Last 1 Encounters:   23 6' 2\" (1.88 m)          Wt Readings from Last 1 Encounters:   23 187 lb 9.8 oz (85.1 kg)         Estimated Creatinine Clearance: 89 mL/min (based on SCr of 0.9 mg/dL). Assessment/Plan:  Day # 6 of vancomycin. Current regimen: 1500 mg IV every 18 hours  Goal -600  Vancomycin level 16.1  Predicted     Will continue current regimen    Thank you for the consult.    Aurea MendezD  2023 8:16 AM

## 2023-02-12 NOTE — PLAN OF CARE
Problem: Safety - Adult  Goal: Free from fall injury  2/12/2023 1046 by Neela Chu RN  Outcome: Progressing  2/12/2023 0603 by Jenny Flannery RN  Outcome: Progressing     Problem: Nutrition Deficit:  Goal: Optimize nutritional status  2/12/2023 1046 by Neela Chu RN  Outcome: Progressing  2/12/2023 0603 by Jenny Flannery RN  Outcome: Progressing

## 2023-02-12 NOTE — PROGRESS NOTES
Patient complained of nausea. He had an episode of N/V @0520. The hospitalist has been informed and was asked to order meds. Zofran was ordered at 0552.

## 2023-02-12 NOTE — PROGRESS NOTES
Hospitalist Progress Note      PCP: Naga Carreon MD    Date of Admission: 2/7/2023      Hospital Course: 27-year-old male with history of PAD, essential hypertension recent aortic aneurysm repair presented to the hospital with abdominal pain, found to have psoas muscle abscess, vascular surgery ID consulted, status post drain still on antibiotics. Subjective: Feels tired, having some lower abdominal pain on the left side, no fever or chills. Medications:  Reviewed    Infusion Medications    sodium chloride Stopped (02/10/23 0139)     Scheduled Medications    polyethylene glycol  17 g Oral BID    vancomycin  1,500 mg IntraVENous Q18H    metroNIDAZOLE  500 mg IntraVENous Q8H    vancomycin (VANCOCIN) intermittent dosing (placeholder)   Other RX Placeholder    atorvastatin  20 mg Oral Daily    metoprolol succinate  25 mg Oral Daily    tamsulosin  0.4 mg Oral Daily    amLODIPine  5 mg Oral Daily    sodium chloride flush  5-40 mL IntraVENous 2 times per day    [Held by provider] enoxaparin  40 mg SubCUTAneous Daily    cefepime  2,000 mg IntraVENous Q8H     PRN Meds: ondansetron, sodium chloride flush, sodium chloride, acetaminophen **OR** acetaminophen, morphine **OR** morphine, guaiFENesin-dextromethorphan      Intake/Output Summary (Last 24 hours) at 2/12/2023 1115  Last data filed at 2/12/2023 0902  Gross per 24 hour   Intake 593.77 ml   Output 615 ml   Net -21.23 ml       Physical Exam Performed:    BP (!) 163/92   Pulse 61   Temp 98.2 °F (36.8 °C) (Oral)   Resp 16   Ht 6' 2\" (1.88 m)   Wt 187 lb 9.8 oz (85.1 kg)   SpO2 95%   BMI 24.09 kg/m²     General appearance: No apparent distress  Neck: Supple  Respiratory:  Normal respiratory effort. Clear to auscultation, bilaterally without Rales/Wheezes/Rhonchi. Cardiovascular: Regular rate and rhythm with normal S1/S2 without murmurs, rubs or gallops.   Abdomen: Soft, non-tender  Musculoskeletal: No clubbing, cyanosis  Skin: Skin color, texture, turgor normal.  No rashes or lesions. Neurologic:  No focal weakness  Psychiatric: Alert and oriented  Capillary Refill: Brisk, 3 seconds, normal   Peripheral Pulses: +2 palpable, equal bilaterally       Labs:   Recent Labs     02/10/23  0625 02/11/23  0617 02/12/23  0703   WBC 10.3 10.7 10.0   HGB 10.5* 11.0* 10.8*   HCT 30.9* 31.7* 32.5*    319 342     Recent Labs     02/10/23  0625 02/11/23  0617 02/12/23  0703   * 133* 135*   K 3.3* 3.6 3.4*    100 100   CO2 21 21 22   BUN 10 12 13   CREATININE 0.9 1.0 0.9   CALCIUM 9.0 9.1 9.1   PHOS 2.9 3.4 2.5     No results for input(s): AST, ALT, BILIDIR, BILITOT, ALKPHOS in the last 72 hours. No results for input(s): INR in the last 72 hours. No results for input(s): Nikki Fuchs in the last 72 hours. Urinalysis:      Lab Results   Component Value Date/Time    NITRU Negative 02/07/2023 06:53 PM    WBCUA 1 02/07/2023 06:53 PM    BACTERIA None Seen 02/07/2023 06:53 PM    RBCUA 8 02/07/2023 06:53 PM    BLOODU LARGE 02/07/2023 06:53 PM    BLOODU large 02/07/2023 02:09 PM    SPECGRAV 1.088 02/07/2023 06:53 PM    SPECGRAV <=1.005 02/07/2023 02:09 PM    GLUCOSEU Negative 02/07/2023 06:53 PM    GLUCOSEU negative 02/07/2023 02:09 PM       Radiology:  CT ABSCESS DRAINAGE W CATH PLACEMENT S&I   Final Result   Successful CT-guided percutaneous drainage catheter placement into a left   iliopsoas fluid collection via a left flank approach. Patient currently has a 10 [place] citizen locking pigtail drainage catheter within   the collection, placed to bulb suction. CT GUIDED NEEDLE PLACEMENT   Final Result   Successful CT-guided percutaneous drainage catheter placement into a left   iliopsoas fluid collection via a left flank approach. Patient currently has a 10 [place] citizen locking pigtail drainage catheter within   the collection, placed to bulb suction.          CT DRAINAGE VISCERAL PERCUTANEOUS   Final Result   Successful CT-guided aspiration of a psoas collection with removal of 5 cc of   old dark serous bloody material sent for analysis as above. CT GUIDED NEEDLE PLACEMENT   Final Result   Successful CT-guided aspiration of a psoas collection with removal of 5 cc of   old dark serous bloody material sent for analysis as above. IP CONSULT TO VASCULAR SURGERY  IP CONSULT TO VASCULAR SURGERY  PHARMACY TO DOSE VANCOMYCIN  IP CONSULT TO INFECTIOUS DISEASES  IP CONSULT TO SPIRITUAL SERVICES    Assessment/Plan:    Active Hospital Problems    Diagnosis     Moderate malnutrition ([facility] [address] [E44.0]      Priority: Medium    CRP elevated [R79.82]      Priority: Medium    Leukocytosis [D72.829]      Priority: Medium    Elevated erythrocyte sedimentation rate [R70.0]      Priority: Medium    Poor dentition [K08.9]      Priority: Medium    History of abdominal aortic aneurysm (AAA) repair [L83.058]      Priority: Medium    Abdominal pain [R10.9]      Priority: Medium    Psoas abscess, left (HCC) [V79.27]      Priority: Medium    PVD (peripheral vascular disease) (HCC) [I73.9]      Priority: Medium     Possible sepsis, with tachypnea and leukocytosis along with intra-abdominal collection probably abscess, present on admission, IV antibiotics started on admission, vascular surgery and infectious disease consulted, Flagyl added to vancomycin and cefepime.   Status post drain   Left psoas fluid collection due to abscess, status post drain, consider restarting Lovenox when okay with vascular surgery  Aortic aneurysm status postrepair, vascular surgery following questionable CT finding about bleeding, unlikely at this time but Lovenox still on hold, follow-up with vascular surgery and further recommendations on when to restart DVT prophylaxis with Lovenox  Anemia mild, no immediate need for transfusion  Generalized weakness, likely due to above monitor closely  Hematuria, follow-up as outpatient repeat urine analysis per primary care physician and might need to follow-up with urology will defer decision to primary care physician  Essential hypertension, uncontrolled, p.o. medication  Peripheral arterial disease, vascular surgery consulted      Diet: ADULT DIET;  Regular  Code Status: Full Code      Fatuma Templeton MD

## 2023-02-13 LAB
ALBUMIN SERPL-MCNC: 3.1 G/DL (ref 3.4–5)
ANAEROBIC CULTURE: NORMAL
ANION GAP SERPL CALCULATED.3IONS-SCNC: 16 MMOL/L (ref 3–16)
BASOPHILS ABSOLUTE: 0.1 K/UL (ref 0–0.2)
BASOPHILS RELATIVE PERCENT: 0.9 %
BUN BLDV-MCNC: 12 MG/DL (ref 7–20)
C-REACTIVE PROTEIN: 72.1 MG/L (ref 0–5.1)
CALCIUM SERPL-MCNC: 9 MG/DL (ref 8.3–10.6)
CHLORIDE BLD-SCNC: 101 MMOL/L (ref 99–110)
CO2: 21 MMOL/L (ref 21–32)
CREAT SERPL-MCNC: 0.9 MG/DL (ref 0.8–1.3)
EOSINOPHILS ABSOLUTE: 0.3 K/UL (ref 0–0.6)
EOSINOPHILS RELATIVE PERCENT: 2.9 %
GFR SERPL CREATININE-BSD FRML MDRD: >60 ML/MIN/{1.73_M2}
GLUCOSE BLD-MCNC: 98 MG/DL (ref 70–99)
HCT VFR BLD CALC: 34.2 % (ref 40.5–52.5)
HEMOGLOBIN: 11.5 G/DL (ref 13.5–17.5)
LYMPHOCYTES ABSOLUTE: 1.3 K/UL (ref 1–5.1)
LYMPHOCYTES RELATIVE PERCENT: 13.9 %
MAGNESIUM: 1.9 MG/DL (ref 1.8–2.4)
MCH RBC QN AUTO: 26.8 PG (ref 26–34)
MCHC RBC AUTO-ENTMCNC: 33.5 G/DL (ref 31–36)
MCV RBC AUTO: 79.8 FL (ref 80–100)
MONOCYTES ABSOLUTE: 0.7 K/UL (ref 0–1.3)
MONOCYTES RELATIVE PERCENT: 7.8 %
NEUTROPHILS ABSOLUTE: 6.9 K/UL (ref 1.7–7.7)
NEUTROPHILS RELATIVE PERCENT: 74.5 %
PDW BLD-RTO: 15.8 % (ref 12.4–15.4)
PHOSPHORUS: 2.3 MG/DL (ref 2.5–4.9)
PLATELET # BLD: 341 K/UL (ref 135–450)
PMV BLD AUTO: 7.8 FL (ref 5–10.5)
POTASSIUM SERPL-SCNC: 3.2 MMOL/L (ref 3.5–5.1)
RBC # BLD: 4.28 M/UL (ref 4.2–5.9)
SEDIMENTATION RATE, ERYTHROCYTE: 110 MM/HR (ref 0–20)
SODIUM BLD-SCNC: 138 MMOL/L (ref 136–145)
WBC # BLD: 9.3 K/UL (ref 4–11)

## 2023-02-13 PROCEDURE — 99233 SBSQ HOSP IP/OBS HIGH 50: CPT | Performed by: INTERNAL MEDICINE

## 2023-02-13 PROCEDURE — 1200000000 HC SEMI PRIVATE

## 2023-02-13 PROCEDURE — 83735 ASSAY OF MAGNESIUM: CPT

## 2023-02-13 PROCEDURE — 6360000002 HC RX W HCPCS: Performed by: STUDENT IN AN ORGANIZED HEALTH CARE EDUCATION/TRAINING PROGRAM

## 2023-02-13 PROCEDURE — 2500000003 HC RX 250 WO HCPCS: Performed by: INTERNAL MEDICINE

## 2023-02-13 PROCEDURE — 2580000003 HC RX 258: Performed by: INTERNAL MEDICINE

## 2023-02-13 PROCEDURE — 6360000002 HC RX W HCPCS: Performed by: INTERNAL MEDICINE

## 2023-02-13 PROCEDURE — 6370000000 HC RX 637 (ALT 250 FOR IP): Performed by: SURGERY

## 2023-02-13 PROCEDURE — 6370000000 HC RX 637 (ALT 250 FOR IP): Performed by: STUDENT IN AN ORGANIZED HEALTH CARE EDUCATION/TRAINING PROGRAM

## 2023-02-13 PROCEDURE — 2580000003 HC RX 258: Performed by: STUDENT IN AN ORGANIZED HEALTH CARE EDUCATION/TRAINING PROGRAM

## 2023-02-13 PROCEDURE — 36415 COLL VENOUS BLD VENIPUNCTURE: CPT

## 2023-02-13 PROCEDURE — C1751 CATH, INF, PER/CENT/MIDLINE: HCPCS

## 2023-02-13 PROCEDURE — 85652 RBC SED RATE AUTOMATED: CPT

## 2023-02-13 PROCEDURE — 02HV33Z INSERTION OF INFUSION DEVICE INTO SUPERIOR VENA CAVA, PERCUTANEOUS APPROACH: ICD-10-PCS | Performed by: STUDENT IN AN ORGANIZED HEALTH CARE EDUCATION/TRAINING PROGRAM

## 2023-02-13 PROCEDURE — 80069 RENAL FUNCTION PANEL: CPT

## 2023-02-13 PROCEDURE — 85025 COMPLETE CBC W/AUTO DIFF WBC: CPT

## 2023-02-13 PROCEDURE — 86140 C-REACTIVE PROTEIN: CPT

## 2023-02-13 PROCEDURE — 6370000000 HC RX 637 (ALT 250 FOR IP): Performed by: INTERNAL MEDICINE

## 2023-02-13 PROCEDURE — 36569 INSJ PICC 5 YR+ W/O IMAGING: CPT

## 2023-02-13 RX ORDER — SODIUM CHLORIDE 9 MG/ML
25 INJECTION, SOLUTION INTRAVENOUS PRN
Status: DISCONTINUED | OUTPATIENT
Start: 2023-02-13 | End: 2023-02-14 | Stop reason: HOSPADM

## 2023-02-13 RX ORDER — SODIUM CHLORIDE 0.9 % (FLUSH) 0.9 %
5-40 SYRINGE (ML) INJECTION EVERY 12 HOURS SCHEDULED
Status: DISCONTINUED | OUTPATIENT
Start: 2023-02-13 | End: 2023-02-14 | Stop reason: HOSPADM

## 2023-02-13 RX ORDER — LIDOCAINE HYDROCHLORIDE 10 MG/ML
5 INJECTION, SOLUTION EPIDURAL; INFILTRATION; INTRACAUDAL; PERINEURAL ONCE
Status: DISCONTINUED | OUTPATIENT
Start: 2023-02-13 | End: 2023-02-14 | Stop reason: HOSPADM

## 2023-02-13 RX ORDER — SODIUM CHLORIDE 0.9 % (FLUSH) 0.9 %
5-40 SYRINGE (ML) INJECTION PRN
Status: DISCONTINUED | OUTPATIENT
Start: 2023-02-13 | End: 2023-02-14 | Stop reason: HOSPADM

## 2023-02-13 RX ORDER — METRONIDAZOLE 500 MG/1
500 TABLET ORAL EVERY 8 HOURS SCHEDULED
Status: DISCONTINUED | OUTPATIENT
Start: 2023-02-13 | End: 2023-02-14 | Stop reason: HOSPADM

## 2023-02-13 RX ADMIN — METRONIDAZOLE 500 MG: 500 INJECTION, SOLUTION INTRAVENOUS at 08:50

## 2023-02-13 RX ADMIN — Medication 10 ML: at 21:00

## 2023-02-13 RX ADMIN — METRONIDAZOLE 500 MG: 500 INJECTION, SOLUTION INTRAVENOUS at 00:49

## 2023-02-13 RX ADMIN — POLYETHYLENE GLYCOL 3350 17 G: 17 POWDER, FOR SOLUTION ORAL at 21:00

## 2023-02-13 RX ADMIN — AMLODIPINE BESYLATE 5 MG: 5 TABLET ORAL at 08:14

## 2023-02-13 RX ADMIN — ACETAMINOPHEN 650 MG: 325 TABLET ORAL at 21:00

## 2023-02-13 RX ADMIN — CEFEPIME 2000 MG: 2 INJECTION, POWDER, FOR SOLUTION INTRAVENOUS at 04:41

## 2023-02-13 RX ADMIN — METOPROLOL SUCCINATE 25 MG: 25 TABLET, EXTENDED RELEASE ORAL at 08:15

## 2023-02-13 RX ADMIN — VANCOMYCIN HYDROCHLORIDE 1500 MG: 1.5 INJECTION, POWDER, LYOPHILIZED, FOR SOLUTION INTRAVENOUS at 22:21

## 2023-02-13 RX ADMIN — METRONIDAZOLE 500 MG: 500 TABLET ORAL at 16:52

## 2023-02-13 RX ADMIN — POLYETHYLENE GLYCOL 3350 17 G: 17 POWDER, FOR SOLUTION ORAL at 08:14

## 2023-02-13 RX ADMIN — ATORVASTATIN CALCIUM 20 MG: 20 TABLET, FILM COATED ORAL at 08:14

## 2023-02-13 RX ADMIN — TAMSULOSIN HYDROCHLORIDE 0.4 MG: 0.4 CAPSULE ORAL at 08:14

## 2023-02-13 RX ADMIN — CEFEPIME 2000 MG: 2 INJECTION, POWDER, FOR SOLUTION INTRAVENOUS at 14:03

## 2023-02-13 RX ADMIN — VANCOMYCIN HYDROCHLORIDE 1500 MG: 1.5 INJECTION, POWDER, LYOPHILIZED, FOR SOLUTION INTRAVENOUS at 04:41

## 2023-02-13 RX ADMIN — CEFEPIME 2000 MG: 2 INJECTION, POWDER, FOR SOLUTION INTRAVENOUS at 21:04

## 2023-02-13 ASSESSMENT — PAIN SCALES - GENERAL
PAINLEVEL_OUTOF10: 0
PAINLEVEL_OUTOF10: 3

## 2023-02-13 NOTE — PLAN OF CARE
Problem: Pain  Goal: Verbalizes/displays adequate comfort level or baseline comfort level  2/12/2023 2313 by Lakisha Sandoval RN  Outcome: Progressing  2/12/2023 1046 by Dianelys Nieves RN  Outcome: Progressing     Problem: Safety - Adult  Goal: Free from fall injury  2/12/2023 2313 by Lakisha Sandoval RN  Outcome: Progressing  2/12/2023 1046 by Dianelys Nieves RN  Outcome: Progressing     Problem: Discharge Planning  Goal: Discharge to home or other facility with appropriate resources  2/12/2023 2313 by Lakisha Sandoval RN  Outcome: Progressing  2/12/2023 1046 by Dianelys Nieves RN  Outcome: Progressing     Problem: Chronic Conditions and Co-morbidities  Goal: Patient's chronic conditions and co-morbidity symptoms are monitored and maintained or improved  2/12/2023 2313 by Lakisha Sandoval RN  Outcome: Progressing  2/12/2023 1046 by Dianelys Nieves RN  Outcome: Progressing     Problem: Nutrition Deficit:  Goal: Optimize nutritional status  2/12/2023 2313 by Lakisha Sandoval RN  Outcome: Progressing  2/12/2023 1046 by Dianelys Nieves RN  Outcome: Progressing

## 2023-02-13 NOTE — PROGRESS NOTES
Infectious Diseases Inpatient Progress  Note        CHIEF COMPLAINT:     Left Psoas fluid collection   AAA repair  ESR ELEVATION   CRP elevation   Left side back pain   PVD          HISTORY OF PRESENT ILLNESS:  81 y.o. man with a significant history for peripheral vascular disease, smoking, hypertension recent abdominal aortic aneurysm repair endoscopy on 1/4/23 now admitted to the hospital secondary to left lower abdominal pain and back pain postoperatively was doing well but recently developed severe pain radiating on the left side was evaluated by primary MD he underwent  CTA of the abdominal aorta on 2/7/23 indicating left psoas fluid collection measuring 3.3 x 2.8 cm this collection was abutting the abdominal aorta representing possible fluid collection seroma versus abscess. He does have bilateral iliac limbs reconstruction for peripheral vascular disease in the past the endovascular repair of abdominal lactic aneurysm is intact without any leak. Admission labs indicate WBC 11.9 hemoglobin 12.3 creatinine 1.1 CRP elevated 70 procalcitonin normal.  He underwent interventional radiology guided aspiration of the fluid collection indicated 5 cc of bloody fluid was sent for culture. Blood cultures are obtained thus far negative we are consulted for antibiotic recommendations.   Location : Left lower quadrant pain and back pain     Quality : Aching      Severity : 10/10  Duration : 1 week     Timing : Constant  Context : Recent abdominal aortic aneurysm repair  Modifying factors : None  Associated signs and symptoms: No fever no chills    INTERVAL HISTORY : s/p IR drain placement lEFT Psoas fluid collection and cx negative but pain is better able to ambulate better no fevers no chills dentition has been very poor and awaiting dental extractions as out pt  - OPAT d/w pt           Past Medical History:    Past Medical History:   Diagnosis Date    Hyperlipidemia     Hypertension     PAD (peripheral artery disease) ([facility] [address]     stenting in left leg        Past Surgical History:    Past Surgical History:   Procedure Laterality Date    ABDOMINAL AORTIC ANEURYSM REPAIR, ENDOVASCULAR N/A 2023    ENDOVASCULAR ABDOMINAL AORTIC ANEURYSM REPAIR, EMBOLIZATION OF RENAL ARTERY performed by Wong Luo DO at [address]  2023    CT VISCERAL PERCUTANEOUS DRAIN 2023 [facility] CT    FEMORAL ENDARTERECTOMY Right 2023    RIGHT FEMORAL ENDARTERECTOMY WITH PATCH performed by Wong Luo DO at [address] Right     rotator cuff    TOE SURGERY Left        Current Medications:    Outpatient Medications Marked as Taking for the 23 encounter [facility] Encounter)   Medication Sig Dispense Refill    lisinopril (PRINIVIL;ZESTRIL) 20 MG tablet Take 20 mg by mouth daily         Allergies:  Morphine    Immunizations :   Immunization History   Administered Date(s) Administered    COVID-19, PFIZER GRAY top, DO NOT Dilute, (age 15 y+), IM, 30 mcg/0.3 mL 2022    COVID-19, PFIZER PURPLE top, DILUTE for use, (age 15 y+), 30mcg/0.3mL 2021, 2021    Influenza Virus Vaccine 2013, 2014, 10/01/2015, 2018    Pneumococcal Conjugate 13-valent (Onqoobv48) 2018    Pneumococcal Polysaccharide (Lxxtlmarx02) 2015    Tdap (Boostrix, Adacel) 2011         Social History:     Social History     Tobacco Use    Smoking status: Former     Packs/day: 0.25     Years: 38.00     Pack years: 9.50     Types: Cigarettes     Start date:      Quit date: 1/3/2023     Years since quittin.1    Smokeless tobacco: Never    Tobacco comments:     cutting down as high as 3 pk/d to as low . 25    Vaping Use    Vaping Use: Never used   Substance Use Topics    Alcohol use: Yes     Comment: rarely    Drug use: Never     Social History     Tobacco Use   Smoking Status Former    Packs/day: 0.25    Years: 38.00    Pack years: 9.50    Types: Cigarettes Start date:     Quit date: 1/3/2023    Years since quittin.1   Smokeless Tobacco Never   Tobacco Comments    cutting down as high as 3 pk/d to as low . 22       Family History   Problem Relation Age of Onset    Cancer Mother         uterine cancer     Other Father         MVA    Cancer Sister         breast    Cancer Brother         brain     Cancer Maternal Aunt         breast    No Known Problems Maternal Grandmother     No Known Problems Maternal Grandfather     No Known Problems Paternal Grandmother     No Known Problems Paternal Grandfather           REVIEW OF SYSTEMS:      Constitutional:  negative for fevers, chills+ , night sweats  Eyes:  negative for blurred vision, eye discharge, visual disturbance   HEENT:  negative for hearing loss, ear drainage,nasal congestion  Respiratory:  negative for cough, shortness of breath or hemoptysis   Cardiovascular:  negative for chest pain, palpitations, syncope  Gastrointestinal:  negative for nausea, vomiting, diarrhea, constipation, abdominal pain  Genitourinary:  negative for frequency, dysuria, urinary incontinence, hematuria  Hematologic/Lymphatic:  negative for easy bruising, bleeding and lymphadenopathy  Allergic/Immunologic:  negative for recurrent infections, angioedema, anaphylaxis   Endocrine:  negative for weight changes, polyuria, polydipsia and polyphagia  Musculoskeletal:  Left lower back pain + and lower abd pain radiation to the left thigh ++ , swelling, decreased range of motion  Integumentary: No rashes, skin lesions  Neurological:  negative for headaches, slurred speech, unilateral weakness  Psychiatric: negative for hallucinations,confusion,agitation.      PHYSICAL EXAM:      Vitals:  T max  99.6   BP (!) 172/91   Pulse 63   Temp 98 °F (36.7 °C) (Oral)   Resp 16   Ht 6' 2\" (1.88 m)   Wt 187 lb 9.8 oz (85.1 kg)   SpO2 98%   BMI 24.09 kg/m²     General Appearance: alert,in no acute distress, ++  pallor, no icterus  Poor dentition++ decayed tooth++   Skin: warm and dry, no rash or erythema  Head: normocephalic and atraumatic  Eyes: pupils equal, round, and reactive to light, conjunctivae normal  ENT: tympanic membrane, external ear and ear canal normal bilaterally, nose without deformity, nasal mucosa and turbinates normal without polyps  Neck: supple and non-tender without mass, no thyromegaly  no cervical lymphadenopathy  Pulmonary/Chest: clear to auscultation bilaterally- no wheezes, rales or rhonchi, normal air movement, no respiratory distress  Cardiovascular: normal rate, regular rhythm, normal S1 and S2, no murmurs, rubs, clicks, or gallops, no carotid bruits  Abdomen: soft, ++ tender, non-distended, normal bowel sounds, no masses or organomegaly  Extremities: no cyanosis, clubbing or edema  Musculoskeletal: normal range of motion, no joint swelling, deformity or tenderness  Integumentary: No rashes, no abnormal skin lesions, no petechiae  Neurologic: reflexes normal and symmetric, no cranial nerve deficit  Psych:  Orientation, sensorium, mood normal   Lines: IV  Left lower quadrant tender+   CRISTOFER Drain in place Blood tinged fluid+     DATA:    CBC:   Lab Results   Component Value Date    WBC 9.3 02/13/2023    HGB 11.5 (L) 02/13/2023    HCT 34.2 (L) 02/13/2023    MCV 79.8 (L) 02/13/2023     02/13/2023     RENAL:   Lab Results   Component Value Date    CREATININE 0.9 02/13/2023    BUN 12 02/13/2023     02/13/2023    K 3.2 (L) 02/13/2023     02/13/2023    CO2 21 02/13/2023     SED RATE:   Lab Results   Component Value Date/Time    SEDRATE 110 02/13/2023 07:37 AM     CK: No results found for: CKTOTAL  CRP:   Lab Results   Component Value Date/Time    CRP 72.1 02/13/2023 07:37 AM     Hepatic Function Panel:   Lab Results   Component Value Date/Time    ALKPHOS 111 01/13/2023 11:13 AM    ALT 15 01/13/2023 11:13 AM    AST 14 01/13/2023 11:13 AM    PROT 6.7 01/13/2023 11:13 AM    BILITOT <0.2 01/13/2023 11:13 AM    LABALBU 3.1 02/13/2023 07:37 AM     UA:  Lab Results   Component Value Date/Time    COLORU Yellow 02/07/2023 06:53 PM    COLORU yellow 02/07/2023 02:09 PM    CLARITYU Clear 02/07/2023 06:53 PM    CLARITYU cloudy 02/07/2023 02:09 PM    GLUCOSEU Negative 02/07/2023 06:53 PM    GLUCOSEU negative 02/07/2023 02:09 PM    BILIRUBINUR Negative 02/07/2023 06:53 PM    BILIRUBINUR negative 02/07/2023 02:09 PM    KETUA Negative 02/07/2023 06:53 PM    KETUA negative 02/07/2023 02:09 PM    SPECGRAV 1.088 02/07/2023 06:53 PM    SPECGRAV <=1.005 02/07/2023 02:09 PM    BLOODU LARGE 02/07/2023 06:53 PM    BLOODU large 02/07/2023 02:09 PM    PHUR 5.0 02/07/2023 06:53 PM    PHUR 6.0 02/07/2023 02:09 PM    PROTEINU 30 02/07/2023 06:53 PM    PROTEINU 30 mg /dl 02/07/2023 02:09 PM    UROBILINOGEN 1.0 02/07/2023 06:53 PM    NITRU Negative 02/07/2023 06:53 PM    LEUKOCYTESUR Negative 02/07/2023 06:53 PM    LEUKOCYTESUR negative 02/07/2023 02:09 PM    LABMICR YES 02/07/2023 06:53 PM    URINETYPE NotGiven 02/07/2023 06:53 PM      Urine Microscopic:   Lab Results   Component Value Date/Time    BACTERIA None Seen 02/07/2023 06:53 PM    HYALCAST 0 02/07/2023 06:53 PM    WBCUA 1 02/07/2023 06:53 PM    RBCUA 8 02/07/2023 06:53 PM    EPIU 1 02/07/2023 06:53 PM     Urine Reflex to Culture:   Lab Results   Component Value Date/Time    URRFLXCULT Not Indicated 02/07/2023 06:53 PM     CRP  72.1     pROCAL 0.06     Wbc  11.9          MICRO: cultures reviewed and updated by me          Culture, Anaerobic and Aerobic [[id number]] Collected: 02/10/23 1145   Order Status: Completed Specimen:  Body Fluid from Abscess Updated: 02/13/23 0930    Gram Stain Result 4+ WBC's (Polymorphonuclear)   No organisms seen     WOUND/ABSCESS No growth 60 to 72 hours    Anaerobic Culture --    Anaerobic culture further report to follow   No anaerobes isolated so far, Further report to follow    Narrative:     ORDER#: [id number]                          ORDERED BY: Cintia Encarnacion SOURCE: Abscess Psoas fluid                COLLECTED:  02/10/23 11:45   ANTIBIOTICS AT KELSI.:                      RECEIVED :  02/10/23 12:41   Culture, Anaerobic [[id number]] Collected: 02/08/23 1601   Order Status: Completed Specimen: Abscess Updated: 02/13/23 0846    Anaerobic Culture No anaerobes isolated   Narrative:     ORDER#: [id number]                          ORDERED BY: JOHNNIE Lopez   SOURCE: Abscess                            COLLECTED:  02/08/23 16:01   ANTIBIOTICS AT KELSI.:                      RECEIVED :  02/08/23 18:06   PSOAS FLUID   Culture, Fungus [[id number]] Collected: 02/10/23 1228   Order Status: Sent Specimen: Body Fluid from Abscess Updated: 02/12/23 1014    Fungus Stain No Fungal elements seen   Narrative:     ORDER#: [id number]                          ORDERED BY: Alicia Cesar   SOURCE: Abscess Psoas fluid                COLLECTED:  02/10/23 12:28   ANTIBIOTICS AT KELSI.:                      RECEIVED :  02/10/23 12:28   Culture with Smear, Acid Fast Bacillius [[id number]] Collected: 02/10/23 1145   Order Status: Sent Specimen: Body Fluid from Abdomen Updated: 02/12/23 0603    AFB Smear No AFB observed by Fluorescent stain   Narrative:     ORDER#: [id number]                          ORDERED BY: Alicia Cesar   SOURCE: Abdomen Psoas fluid                COLLECTED:  02/10/23 11:45   ANTIBIOTICS AT KELSI.:                      RECEIVED :  02/10/23 12:26     Time       MRSA DNA Probe, Nasal [[id number]] Collected: 02/09/23 0900   Order Status: Sent Specimen: Nares Updated: 02/09/23 0911   Culture, Blood 2 [[id number]] Collected: 02/07/23 2212   Order Status: Completed Specimen: Blood Updated: 02/08/23 2315    Culture, Blood 2 No Growth to date. Any change in status will be called.    Narrative:     ORDER#: [id number]                          ORDERED BY: Natalie Souza   SOURCE: Blood                              COLLECTED:  02/07/23 22:12   ANTIBIOTICS AT KELSI.: RECEIVED :  02/07/23 22:25   If child <=2 yrs old please draw pediatric bottle. ~Blood Culture #2   Culture, Blood 1 [[id number]] Collected: 02/07/23 1853   Order Status: Completed Specimen: Blood Updated: 02/08/23 2115    Blood Culture, Routine No Growth to date. Any change in status will be called. Narrative:     ORDER#: [id number]                          ORDERED BY: Jessika Argueta   SOURCE: Blood                              COLLECTED:  02/07/23 18:53   ANTIBIOTICS AT KELSI.:                      RECEIVED :  02/07/23 19:04   If child <=2 yrs old please draw pediatric bottle. ~Blood Culture 1   Culture, Anaerobic [[id number]] Collected: 02/08/23 1601   Order Status: No result Updated: 02/08/23 1818   Culture, Body Fluid [[id number]] Collected: 02/08/23 1601   Order Status: Sent Specimen: Body Fluid Updated: 02/08/23 1801     Blood Culture:   Lab Results   Component Value Date/Time    BC No Growth after 4 days of incubation. 02/07/2023 06:53 PM    BLOODCULT2 No Growth after 4 days of incubation. 02/07/2023 10:12 PM            Culture, Body Fluid [[id number]] Collected: 02/08/23 1601   Order Status: Completed Specimen: Body Fluid Updated: 02/10/23 9738    Body Fluid Culture, Sterile --    No growth to date   No growth 36 to 48 hours     Gram Stain Result No Epithelial Cells seen   4+ WBC's (Polymorphonuclear)   No organisms seen    Narrative:     ORDER#: [id number]                          ORDERED BY: JOHNNIE Cobb   SOURCE: Fluid psoas                        COLLECTED:  02/08/23 16:01   ANTIBIOTICS AT KELSI.:                      RECEIVED :  02/08/23 18:00   Culture, Anaerobic and Aerobic [[id number]]        Viral Culture:    No results found for: COVID19  Urine Culture: No results for input(s): LABURIN in the last 72 hours.     Scheduled Meds:   polyethylene glycol  17 g Oral BID    vancomycin  1,500 mg IntraVENous Q18H    metroNIDAZOLE  500 mg IntraVENous Q8H    vancomycin (VANCOCIN) intermittent dosing (placeholder)   Other RX Placeholder    atorvastatin  20 mg Oral Daily    metoprolol succinate  25 mg Oral Daily    tamsulosin  0.4 mg Oral Daily    amLODIPine  5 mg Oral Daily    sodium chloride flush  5-40 mL IntraVENous 2 times per day    [Held by provider] enoxaparin  40 mg SubCUTAneous Daily    cefepime  2,000 mg IntraVENous Q8H       Continuous Infusions:   sodium chloride Stopped (02/10/23 0139)       PRN Meds:  ondansetron, sodium chloride flush, sodium chloride, acetaminophen **OR** acetaminophen, morphine **OR** morphine, guaiFENesin-dextromethorphan    Imaging:   CT ABSCESS DRAINAGE W CATH PLACEMENT S&I   Final Result   Successful CT-guided percutaneous drainage catheter placement into a left   iliopsoas fluid collection via a left flank approach. Patient currently has a 10 [place] locking pigtail drainage catheter within   the collection, placed to bulb suction. CT GUIDED NEEDLE PLACEMENT   Final Result   Successful CT-guided percutaneous drainage catheter placement into a left   iliopsoas fluid collection via a left flank approach. Patient currently has a 10 [place] locking pigtail drainage catheter within   the collection, placed to bulb suction. CT DRAINAGE VISCERAL PERCUTANEOUS   Final Result   Successful CT-guided aspiration of a psoas collection with removal of 5 cc of   old dark serous bloody material sent for analysis as above. CT GUIDED NEEDLE PLACEMENT   Final Result   Successful CT-guided aspiration of a psoas collection with removal of 5 cc of   old dark serous bloody material sent for analysis as above. CT abd/pelvis from 2/7/23       Impression   1. Successful endovascular repair of the abdominal aortic aneurysm. AP   diameter of the abdominal aorta is currently down to 5.5 cm from 6.1 cm   previously. 2. No evidence of endoleaks. 3. Left psoas collection measures 3.3 x 2.8 cm up from 1.4 x 1.7 cm   previously.   This collection abuts the aorta and there is concern for   possible infection. 4. Disease at the origins of renal arteries especially on the right and there   is suspicion of a likely infarct in the right kidney. 5. Diverticulosis but no acute diverticulitis. RECOMMENDATIONS:   1. Aspiration of the left psoas fluid collection to rule out abscess. 2. For management of fusiform AAA:   Note: Recommend Vascular consultation if a fusiform AAA enlarges by >0.5 cm   in 6 months or >1 cm in 1 year or for a saccular AAA of any size. References: Trisha Host Radiol 2013; 70(11):296-017; J Vasc Surg. 2018; 67:2-77     All pertinent images and reports for the current Hospitalization were reviewed by me. IMPRESSION:    Patient Active Problem List   Diagnosis    Benign prostatic hyperplasia (BPH) with urinary urgency    Essential hypertension    Hypercholesteremia    Smoker    Abdominal aortic aneurysm (AAA) greater than 5.0 cm in diameter in female    Inflammatory abdominal aortic aneurysm    Aneurysm of infrarenal abdominal aorta    Status post endovascular aneurysm repair (EVAR)    PVD (peripheral vascular disease) (HCC)    Generalized abdominal pain    Smoking    Abdominal pain    Psoas abscess, left (HCC)    Moderate malnutrition (HCC)    CRP elevated    Leukocytosis    Elevated erythrocyte sedimentation rate    Poor dentition    History of abdominal aortic aneurysm (AAA) repair     S/p Endovascular AAA repair emergent on 1/4/23   Endograft is patent no endo leak  Left Psoas fluid collection  3.3  x 2.8  cm' abuts Aorta  H/o PVD and vascular stent lower extremity   ESR, CRP elevation  Some systemic symptoms with Chills and low  grade fevers? Poor dentition   Smoking +  Blood cx -ve  S/p IR aspiration of Left PSOAS FLUID and it was bloody? Fluid cx in process NGTD      Given the presentation some concern for the infectious process but also could be old blood from his prior AAA leak ?  As this was see in the past on the previous CTA but was smaller - IR aspiration of the fluid completed and results pending     ESR, CRP elevation is concerning but can be seen in Hematoma or inflammatory process     S/p IR aspiration and  drain placement in the fluid collection and its not clear pus but blood tinged fluid and Cx in process     OPAT d/w pt and plan PICC line as Blood cx negative    ESR, CRP still high - and will trend as out patient         Labs, Microbiology, Radiology and pertinent results from current hospitalization and care every where were reviewed by me as a part of the consultation. PLAN :  Cont IV Vancomycin change to x 1 gm  Q 24 hr at d/c stop date x  3/7  Change  to oral Flagyl x  500mg x Q 8 HRS as in patient only   Cont IV Cefepime as in patient only   At d/c will change to IV Ertapenem x 1 gm Q 24 HR x stop date  3/7  Trend ESR, CRP check    Fluid for cultures Aerobic, Anaerobic, Fungal and AFB in Process SO FAR -ve  bLOOD cx NGTD  Long course of IV abx due to risk and morbidity with not treating   D/w  and we will follow on the cx    EMILY done - follow up with me in x 3  weeks       Discussed with patient/Family and Nursing   Risk of Complications/Morbidity: High      Illness(es)/ Infection present that pose threat to bodily function. There is potential for severe exacerbation of infection/side effects of treatment. Therapy requires intensive monitoring for antimicrobial agent toxicity. Thanks for allowing me to participate in your patient's care please call me with any questions or concerns.     Dr. Pratik Akers MD  [facility]  Phone: [phone]   Fax : [phone]

## 2023-02-13 NOTE — PROGRESS NOTES
Hospital Medicine Progress Note      Admit Date: 2/7/2023       CC: F/U for abd pain, left sided flank pain     HPI: 72-year-old male with history of PAD, essential hypertension recent aortic aneurysm repair presented to the hospital with abdominal pain, found to have psoas muscle abscess, vascular surgery ID consulted, status post drain still on antibiotics. Interval History/Subjective: pt with CRISTOFER drain in place with minimal serosang drainage in CRISTOFER. Site unremarkable. Dr. Batool Jenkins seeing patient. Patient says he thinks his infection started from his teeth that he needs to get taken care of    Review of Systems:       The patient denied headaches, visual changes, LOC, SOB, CP, ABD pain, N/V/D, skin changes, new or worsening weakness or neuromuscular deficits. Comprehensive ROS negative except as mentioned above. Past Medical History:        Diagnosis Date    Hyperlipidemia     Hypertension     PAD (peripheral artery disease) (HCC)     stenting in left leg        Past Surgical History:        Procedure Laterality Date    ABDOMINAL AORTIC ANEURYSM REPAIR, ENDOVASCULAR N/A 1/4/2023    ENDOVASCULAR ABDOMINAL AORTIC ANEURYSM REPAIR, EMBOLIZATION OF RENAL ARTERY performed by Kingsley Arroyo DO at [address]  2/8/2023    CT VISCERAL PERCUTANEOUS DRAIN 2/8/2023 [facility] CT    FEMORAL ENDARTERECTOMY Right 1/4/2023    RIGHT FEMORAL ENDARTERECTOMY WITH PATCH performed by Kingsley Arroyo DO at [address] [facility] Right     rotator cuff    TOE SURGERY Left        Allergies:  Morphine    Past medical and surgical history reviewed. Any changes have been noted.      PHYSICAL EXAM:  BP (!) 172/91   Pulse 63   Temp 98 °F (36.7 °C) (Oral)   Resp 16   Ht 6' 2\" (1.88 m)   Wt 187 lb 9.8 oz (85.1 kg)   SpO2 98%   BMI 24.09 kg/m²       Intake/Output Summary (Last 24 hours) at 2/13/2023 1101  Last data filed at 2/13/2023 0442  Gross per 24 hour   Intake 902.45 ml Output 560 ml   Net 342.45 ml        General appearance:   No apparent distress, appears stated age. Cooperative. HEENT:  Normocephalic, atraumatic. PERRLA. EOMi. Conjunctivae/corneas clear, no icterus, non-injected. Neck: Supple, with full range of motion. No jugular venous distention. Trachea midline. Respiratory:  Normal respiratory effort. Clear to auscultation, bilaterally without Rales/Wheezes/Rhonchi. Cardiovascular:  Regular rate and rhythm without murmurs, rubs or gallops. Abdomen: left flank CRISTOFER drain with serosang drainage; Soft, non-tender, non-distended, without rebound or guarding. Normal bowel sounds. Musculoskeletal:  No clubbing, cyanosis or edema bilaterally. Full range of motion without deformity. Skin: Skin color, texture, turgor normal.  No rashes or lesions. Neurologic:  Neurovascularly intact without any focal sensory/motor deficits. Cranial nerves: II-XII intact, grossly intact. No facial asymmetry, tongue midline. Psychiatric:  Alert and oriented, thought content appropriate  Capillary Refill: Brisk,< 3 seconds   Peripheral Pulses: +2 palpable, equal bilaterally       LABS:    Lab Results   Component Value Date    WBC 9.3 02/13/2023    HGB 11.5 (L) 02/13/2023    HCT 34.2 (L) 02/13/2023    MCV 79.8 (L) 02/13/2023     02/13/2023    LYMPHOPCT 13.9 02/13/2023    RBC 4.28 02/13/2023    MCH 26.8 02/13/2023    MCHC 33.5 02/13/2023    RDW 15.8 (H) 02/13/2023       Lab Results   Component Value Date    CREATININE 0.9 02/13/2023    BUN 12 02/13/2023     02/13/2023    K 3.2 (L) 02/13/2023     02/13/2023    CO2 21 02/13/2023       Lab Results   Component Value Date/Time    MG 1.90 02/13/2023 07:37 AM       Lab Results   Component Value Date    ALT 15 01/13/2023    AST 14 (L) 01/13/2023    ALKPHOS 111 01/13/2023    BILITOT <0.2 01/13/2023        No flowsheet data found.     No results found for: LABA1C    Imaging:  CT ABSCESS DRAINAGE W CATH PLACEMENT S&I   Final Result Successful CT-guided percutaneous drainage catheter placement into a left   iliopsoas fluid collection via a left flank approach. Patient currently has a 10 Ethiopian locking pigtail drainage catheter within   the collection, placed to bulb suction. CT GUIDED NEEDLE PLACEMENT   Final Result   Successful CT-guided percutaneous drainage catheter placement into a left   iliopsoas fluid collection via a left flank approach. Patient currently has a 10 Ethiopian locking pigtail drainage catheter within   the collection, placed to bulb suction. CT DRAINAGE VISCERAL PERCUTANEOUS   Final Result   Successful CT-guided aspiration of a psoas collection with removal of 5 cc of   old dark serous bloody material sent for analysis as above. CT GUIDED NEEDLE PLACEMENT   Final Result   Successful CT-guided aspiration of a psoas collection with removal of 5 cc of   old dark serous bloody material sent for analysis as above. Scheduled and prn Medications:    Scheduled Meds:   polyethylene glycol  17 g Oral BID    vancomycin  1,500 mg IntraVENous Q18H    metroNIDAZOLE  500 mg IntraVENous Q8H    vancomycin (VANCOCIN) intermittent dosing (placeholder)   Other RX Placeholder    atorvastatin  20 mg Oral Daily    metoprolol succinate  25 mg Oral Daily    tamsulosin  0.4 mg Oral Daily    amLODIPine  5 mg Oral Daily    sodium chloride flush  5-40 mL IntraVENous 2 times per day    [Held by provider] enoxaparin  40 mg SubCUTAneous Daily    cefepime  2,000 mg IntraVENous Q8H     Continuous Infusions:   sodium chloride Stopped (02/10/23 0139)     PRN Meds:.ondansetron, sodium chloride flush, sodium chloride, acetaminophen **OR** acetaminophen, morphine **OR** morphine, guaiFENesin-dextromethorphan    Assessment & Plan:          Moderate malnutrition ([facility] [E44.0]         Priority: Medium    CRP elevated [R79.82]         Priority: Medium    Leukocytosis [D72.829]         Priority: Medium    Elevated erythrocyte sedimentation rate [R70.0]         Priority: Medium    Poor dentition [K08.9]         Priority: Medium    History of abdominal aortic aneurysm (AAA) repair [T13.059]         Priority: Medium    Abdominal pain [R10.9]         Priority: Medium    Psoas abscess, left (HCC) [K68.12]         Priority: Medium    PVD (peripheral vascular disease) ([address] [I73.9]         Priority: Medium      Possible sepsis, with tachypnea and leukocytosis along with intra-abdominal collection probably abscess, present on admission, IV antibiotics started on admission, vascular surgery and infectious disease consulted, Flagyl added to vancomycin and cefepime. Status post drain   Left psoas fluid collection due to abscess, status post drain, consider restarting Lovenox when okay with vascular surgery  Aortic aneurysm status postrepair, vascular surgery following questionable CT finding about bleeding, unlikely at this time but Lovenox still on hold, follow-up with vascular surgery and further recommendations on when to restart DVT prophylaxis with Lovenox  Anemia mild, no immediate need for transfusion  Generalized weakness, likely due to above monitor closely  Hematuria, follow-up as outpatient repeat urine analysis per primary care physician and might need to follow-up with urology will defer decision to primary care physician  Essential hypertension, uncontrolled, p.o. medication  Peripheral arterial disease, vascular surgery consulted  Per Dr. Chayito Oneill, keep drain until it stops collecting fluid--likely 4 wks or so. ID to see patient with recs. Continue current regimen/therapies. Monitor. Adjust medical regimen as appropriate. Body mass index is 24.09 kg/m². The patient and / or the family were informed of the results of any tests, a time was given to answer questions, a plan was proposed and they agreed with plan.       DVT ppx: holding lovenox in setting of drain placement       Diet: ADULT DIET; Regular    Consults:  IP CONSULT TO VASCULAR SURGERY  IP CONSULT TO VASCULAR SURGERY  PHARMACY TO DOSE VANCOMYCIN  IP CONSULT TO INFECTIOUS DISEASES  IP CONSULT TO SPIRITUAL SERVICES    DISPO/placement plan: pending    Code Status: Full Code      Darrius Shafer, APRN - CNP  02/13/23

## 2023-02-13 NOTE — DISCHARGE INSTR - COC
Continuity of Care Form    Patient Name: Edith Pemberton   :  1950  MRN:  [id number]    Admit date:  2023  Discharge date:  2023    Code Status Order: Full Code   Advance Directives:     Admitting Physician:  Neela Kennedy DO  PCP: Bernard Christensen MD    Discharging Nurse: [facility] Unit/Room#: V5J-0818/1561-51  Discharging Unit Phone Number: [phone]    Emergency Contact:   Extended Emergency Contact Information  Primary Emergency Contact: Sara Berg  Address: [address] [place] [address]  Home Phone: [phone]  Mobile Phone: [phone]  Relation: Other   needed? No  Secondary Emergency Contact: Bel Miller  Address: [address]  Mobile Phone: [phone]  Relation: Brother/Sister  Preferred language: English   needed?  No    Past Surgical History:  Past Surgical History:   Procedure Laterality Date    ABDOMINAL AORTIC ANEURYSM REPAIR, ENDOVASCULAR N/A 2023    ENDOVASCULAR ABDOMINAL AORTIC ANEURYSM REPAIR, EMBOLIZATION OF RENAL ARTERY performed by Darrius Apodaca DO at [address] [facility] [address]  2023    CT VISCERAL PERCUTANEOUS DRAIN 2023 [facility]    FEMORAL ENDARTERECTOMY Right 2023    RIGHT FEMORAL ENDARTERECTOMY WITH PATCH performed by Darrius Apodaca DO at [address] [facility] [address] Right     rotator cuff    TOE SURGERY Left        Immunization History:   Immunization History   Administered Date(s) Administered    COVID-19, PFIZER GRAY top, DO NOT Dilute, (age 15 y+), IM, 30 mcg/0.3 mL 2022    COVID-19, PFIZER PURPLE top, DILUTE for use, (age 15 y+), 30mcg/0.3mL 2021, 2021    Influenza Virus Vaccine 2013, 2014, 10/01/2015, 2018    Pneumococcal Conjugate 13-valent ([id number]) 2018    Pneumococcal Polysaccharide ([id number]) 2015    Tdap (Boostrix, Adacel) 2011       Active Problems:  Patient Active Problem List   Diagnosis Code    Benign prostatic hyperplasia (BPH) with urinary urgency N40.1, R39.15    Essential hypertension I10    Hypercholesteremia E78.00    Smoker F17.200    Abdominal aortic aneurysm (AAA) greater than 5.0 cm in diameter in female I71.40    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    Aneurysm of infrarenal abdominal aorta I71.43    Status post endovascular aneurysm repair (EVAR) Z98.890, Z86.79    PVD (peripheral vascular disease) ([facility]) I73.9    Generalized abdominal pain R10.84    Smoking F17.200    Abdominal pain R10.9    Psoas abscess, left ([facility]) K68.12    Moderate malnutrition ([facility]) E44.0    CRP elevated R79.82    Leukocytosis D72.829    Elevated erythrocyte sedimentation rate R70.0    Poor dentition K08.9    History of abdominal aortic aneurysm (AAA) repair Z98.890       Isolation/Infection:   Isolation            No Isolation          Patient Infection Status       None to display            Nurse Assessment:  Last Vital Signs: BP (!) 172/91   Pulse 63   Temp 98 °F (36.7 °C) (Oral)   Resp 16   Ht 6' 2\" (1.88 m)   Wt 187 lb 9.8 oz (85.1 kg)   SpO2 98%   BMI 24.09 kg/m²     Last documented pain score (0-10 scale): Pain Level: 0  Last Weight:   Wt Readings from Last 1 Encounters:   02/12/23 187 lb 9.8 oz (85.1 kg)     Mental Status:  {IP PT MENTAL STATUS:41504}    IV Access:  { EMILY IV ACCESS:682837507}    Nursing Mobility/ADLs:  Walking   {P DME HGCS:185938710}  Transfer  {P DME CCXF:342914994}  Bathing  {CHP DME SWPP:138956824}  Dressing  {CHP DME FXZK:488890807}  Toileting  {P DME ZEII:143245101}  Feeding  {P DME RXEZ:896108336}  Med Admin  {CHP DME HKLX:898436891}  Med Delivery   { EMILY MED Delivery:601499010}    Wound Care Documentation and Therapy:  Incision 01/04/23 Groin Anterior;Right (Active)   Number of days: 40        Elimination:  Continence:    Bowel: {YES / WE:49882}  Bladder: {YES / SY:71733}  Urinary Catheter: {Urinary Catheter:953221693}   Colostomy/Ileostomy/Ileal Conduit: {YES / IC:64557}       Date of Last BM: ***    Intake/Output Summary (Last 24 hours) at 2/13/2023 1548  Last data filed at 2/13/2023 0442  Gross per 24 hour   Intake 802.45 ml   Output 560 ml   Net 242.45 ml     I/O last 3 completed shifts: In: 1496.2 [P.O.:220; IV Piggyback:1276.2]  Out: 1 [Urine:1000; Drains:25]    Safety Concerns:     None    Impairments/Disabilities:      None    Nutrition Therapy:  Current Nutrition Therapy:   - Oral Diet:  General    Routes of Feeding: Oral  Liquids: No Restrictions  Daily Fluid Restriction: no  Last Modified Barium Swallow with Video (Video Swallowing Test): not done    Treatments at the Time of Hospital Discharge:   Respiratory Treatments: n/a    Oxygen Therapy:  is not on home oxygen therapy.   Ventilator:    - No ventilator support    Rehab Therapies: {THERAPEUTIC INTERVENTION:6098084304}  Weight Bearing Status/Restrictions: No weight bearing restrictions  Other Medical Equipment (for information only, NOT a DME order):  {EQUIPMENT:643294361}  Other Treatments: ***    Patient's personal belongings (please select all that are sent with patient):  None    RN SIGNATURE:  Electronically signed by Nai Hdz RN on 2/14/23 at 2:39 PM EST    CASE MANAGEMENT/SOCIAL WORK SECTION    Inpatient Status Date: 2/7/23    Readmission Risk Assessment Score:  Readmission Risk              Risk of Unplanned Readmission:  15           Discharging to Facility/ Agency   Name: [facility]  Address:   [address] [place] [address]  Phone:  [phone]  Fax:  [phone]      / signature: Electronically signed by [id number] SASHA Solano on 2/13/23 at 4:26 PM EST    PHYSICIAN SECTION    Prognosis: {Prognosis:6670729618}    Condition at Discharge: [address] Patient Condition:391556770}    Rehab Potential (if transferring to Rehab): {Prognosis:9591183778}    Recommended Labs or Other Treatments After Discharge:   IV Vancomycin x 1 gm Q 24 hr  x stop date  x 3/7  IV Ertapenem x 1 gm q 24 hr x stop date  x 3/7  CBC with diff, CMP, ESR, CRP , Vancomycin trough weekly  Fax results to [phone]  PICC line in place  Follow up with me x  3  weeks  First dose given in hospital     [address] [facility]  Phone: [phone]   Fax : [phone]       Physician Certification: I certify the above information and transfer of Florentin Arechiga  is necessary for the continuing treatment of the diagnosis listed and that he requires [address] for less 30 days.      Update Admission H&P: No change in H&P    PHYSICIAN SIGNATURE:  Electronically signed by LELE Hopkins CNP on 2/14/23 at 10:42 AM CIARAN/ Dr. Gardenia Frost

## 2023-02-13 NOTE — CARE COORDINATION
02/13/23 1618   IMM Letter   IMM Letter given to Patient/Family/Significant other/Guardian/POA/by: Molly BAEZ to pt at bedside, copy left with pt. Education provided to patient. Patient reported no questions and verbalized understanding. Patient made aware that he/she has 4 hours prior to discharging from hospital to decide if he/she wishes to pursue the Medicare appeal process.    IMM Letter date given: 02/13/23   IMM Letter time given: 1618   Electronically signed by [id number] SASHA Palacios on 2/13/2023 at 4:19 PM

## 2023-02-13 NOTE — CARE COORDINATION
Met with pt, friend Cherry Aviles who lives down the street, and sister Fabrizio Alva was on the phone. Answered questions. They would like home IV abx. [facility] [place]. list provided, reviewed and they chose [facility]. Referral made, spoke with Elvira who is able to accept. Spoke with Faye at [facility] who is following for medication order and ID note. Pt is also wondering when he can have oral surgery. Does he have to wait a certain amount of time or can he have it right away? RN messaged NP asking.   SW following for dc orders  Electronically signed by HAWA Ray on 2/13/2023 at 4:31 PM

## 2023-02-13 NOTE — PROGRESS NOTES
Comprehensive Nutrition Assessment    Type and Reason for Visit:  Reassess    Nutrition Recommendations/Plan:   Continue on regular diet  Provide Ensure Enlive (no vanilla)  Monitor meal and supplement intake     Malnutrition Assessment:  Malnutrition Status: Moderate malnutrition (02/09/23 1429)    Context:  Acute Illness     Findings of the 6 clinical characteristics of malnutrition:  Energy Intake:  Unable to assess  Weight Loss:  Greater than 5% over 1 month     Body Fat Loss:  Mild body fat loss Orbital, Triceps   Muscle Mass Loss:  Mild muscle mass loss Temples (temporalis), Clavicles (pectoralis & deltoids)  Fluid Accumulation:  No significant fluid accumulation     Strength:  Not Performed    Nutrition Assessment:    Follow up. Pt's intake has been very limited. He was eating salad at time of visit. He does have an infected tooth which is causing some difficutly with eating and reports constipation that has now been resolved. However the greatest factor affecting his intake is the hospital food. Discussed sandwiches as a possible option. Pt does eat yogurt and has had some brought in for him. he did have L psoas drained of 30 cc fluid on 2/10. Pt is willing to try Ensure Enlive. Nutrition Related Findings:    Reviewed labs, no edema, BM 2/7 Wound Type: None       Current Nutrition Intake & Therapies:    Average Meal Intake: 0%  Average Supplements Intake: None Ordered  ADULT DIET; Regular    Anthropometric Measures:  Height: 6' 2\" (188 cm)  Ideal Body Weight (IBW): 190 lbs (86 kg)       Current Body Weight: 190 lb (86.2 kg), 98.9 % IBW.  Weight Source: Standing Scale  Current BMI (kg/m2): 24.4  Usual Body Weight: 202 lb (91.6 kg)  % Weight Change (Calculated): -6.9                    BMI Categories: Normal Weight (BMI 22.0 to 24.9) age over 72    Estimated Daily Nutrient Needs:  Energy Requirements Based On: Kcal/kg  Weight Used for Energy Requirements: Current  Energy (kcal/day): 7244-6151 kcal (25-30 kcal/kg ABW)  Weight Used for Protein Requirements: Current  Protein (g/day):  gm (1-1.2 gm/kg ABW)  Method Used for Fluid Requirements: 1 ml/kcal  Fluid (ml/day):      Nutrition Diagnosis:   Moderate malnutrition related to inadequate protein-energy intake as evidenced by Criteria as identified in malnutrition assessment    Nutrition Interventions:   Food and/or Nutrient Delivery: Continue Current Diet, Start Oral Nutrition Supplement  Nutrition Education/Counseling: No recommendation at this time  Coordination of Nutrition Care: Continue to monitor while inpatient       Goals:  Previous Goal Met: No Progress toward Goal(s)  Goals: PO intake 50% or greater, prior to discharge       Nutrition Monitoring and Evaluation:   Behavioral-Environmental Outcomes: None Identified  Food/Nutrient Intake Outcomes: Food and Nutrient Intake, Supplement Intake  Physical Signs/Symptoms Outcomes: Biochemical Data, Constipation, Nutrition Focused Physical Findings, Skin, Weight    Discharge Planning:     Too soon to determine     Diego Swartz, [address] [place] [address],   Contact: [phone]

## 2023-02-13 NOTE — PROGRESS NOTES
[facility]) Vascular Surgery Progress Note      Chief Complaint:   Chief Complaint   Patient presents with    Abdominal Pain     Pt reports left sided abdominal pain and left sided flank pain for the last week. Pt alert and oriented at this time and rates pain as a 8/10.        :     Jerry Velázquez is a 81 y.o. male patient. Subjective  No further pain in the abdomen  Cultures still pending    1. Psoas abscess, left (HCC)    2. Leukocytosis, unspecified type    3. CRP elevated      Past Medical History:   Diagnosis Date    Hyperlipidemia     Hypertension     PAD (peripheral artery disease) (HCC)     stenting in left leg      No past surgical history pertinent negatives on file.   Scheduled Meds:   lidocaine 1 % injection  5 mL IntraDERmal Once    sodium chloride flush  5-40 mL IntraVENous 2 times per day    metroNIDAZOLE  500 mg Oral 3 times per day    polyethylene glycol  17 g Oral BID    vancomycin  1,500 mg IntraVENous Q18H    vancomycin (VANCOCIN) intermittent dosing (placeholder)   Other RX Placeholder    atorvastatin  20 mg Oral Daily    metoprolol succinate  25 mg Oral Daily    tamsulosin  0.4 mg Oral Daily    amLODIPine  5 mg Oral Daily    sodium chloride flush  5-40 mL IntraVENous 2 times per day    [Held by provider] enoxaparin  40 mg SubCUTAneous Daily    cefepime  2,000 mg IntraVENous Q8H     Continuous Infusions:   sodium chloride      sodium chloride Stopped (02/10/23 0139)     PRN Meds:sodium chloride flush, sodium chloride, ondansetron, sodium chloride flush, sodium chloride, acetaminophen **OR** acetaminophen, morphine **OR** morphine, guaiFENesin-dextromethorphan    Active Problems:    PVD (peripheral vascular disease) (HCC)    Abdominal pain    Psoas abscess, left (HCC)    Moderate malnutrition (HCC)    CRP elevated    Leukocytosis    Elevated erythrocyte sedimentation rate    Poor dentition    History of abdominal aortic aneurysm (AAA) repair  Resolved Problems:    * No resolved hospital problems. *    Blood pressure (!) 172/91, pulse 63, temperature 98 °F (36.7 °C), temperature source Oral, resp. rate 16, height 6' 2\" (1.88 m), weight 187 lb 9.8 oz (85.1 kg), SpO2 98 %. Objective:  Vital signs (most recent): Blood pressure (!) 172/91, pulse 63, temperature 98 °F (36.7 °C), temperature source Oral, resp. rate 16, height 6' 2\" (1.88 m), weight 187 lb 9.8 oz (85.1 kg), SpO2 98 %. General appearance: Comfortable. Lungs:  Normal effort. Heart: Normal rate. Abdomen: Abdomen is soft. No distension. Tenderness: There is no abdominal tenderness tenderness. Extremities: There is normal range of motion. Neurological: The patient is alert.         Assessment & Plan    Fluid is sterile up to this point which is encouraging  Labs stable  Will defer to ID when safe for discharge if planning IV antibiotics  Keep drain until it stops collecting fluid likely 4 weeks or so      Lea Bello DO, FACS, FSVS, [address] [facility] Vascular and Endovascular Surgery

## 2023-02-13 NOTE — PROGRESS NOTES
Arrived to place PICC line in patient with chelsea RN at bedside, pre procedure and allergies reviewed, no issues accessing right cephalic vein, pt tolerated well, blood return and flushed well, tip verified with 3cg technology with peaked p-waves, OK to use PICC. Pt left in stable condition and bed braked and in lowest position. Pt call light within reach.  Handoff to RN

## 2023-02-14 VITALS
HEART RATE: 65 BPM | HEIGHT: 74 IN | TEMPERATURE: 97.6 F | DIASTOLIC BLOOD PRESSURE: 84 MMHG | RESPIRATION RATE: 18 BRPM | OXYGEN SATURATION: 94 % | SYSTOLIC BLOOD PRESSURE: 162 MMHG | WEIGHT: 183.2 LBS | BODY MASS INDEX: 23.51 KG/M2

## 2023-02-14 LAB
ALBUMIN SERPL-MCNC: 3.4 G/DL (ref 3.4–5)
ANION GAP SERPL CALCULATED.3IONS-SCNC: 15 MMOL/L (ref 3–16)
BASOPHILS ABSOLUTE: 0.1 K/UL (ref 0–0.2)
BASOPHILS RELATIVE PERCENT: 1 %
BUN BLDV-MCNC: 13 MG/DL (ref 7–20)
CALCIUM SERPL-MCNC: 9.4 MG/DL (ref 8.3–10.6)
CHLORIDE BLD-SCNC: 100 MMOL/L (ref 99–110)
CO2: 22 MMOL/L (ref 21–32)
CREAT SERPL-MCNC: 1 MG/DL (ref 0.8–1.3)
EOSINOPHILS ABSOLUTE: 0.3 K/UL (ref 0–0.6)
EOSINOPHILS RELATIVE PERCENT: 2.9 %
GFR SERPL CREATININE-BSD FRML MDRD: >60 ML/MIN/{1.73_M2}
GLUCOSE BLD-MCNC: 101 MG/DL (ref 70–99)
HCT VFR BLD CALC: 40.4 % (ref 40.5–52.5)
HEMOGLOBIN: 12.6 G/DL (ref 13.5–17.5)
LYMPHOCYTES ABSOLUTE: 2.2 K/UL (ref 1–5.1)
LYMPHOCYTES RELATIVE PERCENT: 22.1 %
MAGNESIUM: 2.1 MG/DL (ref 1.8–2.4)
MCH RBC QN AUTO: 26.7 PG (ref 26–34)
MCHC RBC AUTO-ENTMCNC: 31.3 G/DL (ref 31–36)
MCV RBC AUTO: 85.5 FL (ref 80–100)
MONOCYTES ABSOLUTE: 1 K/UL (ref 0–1.3)
MONOCYTES RELATIVE PERCENT: 9.5 %
NEUTROPHILS ABSOLUTE: 6.5 K/UL (ref 1.7–7.7)
NEUTROPHILS RELATIVE PERCENT: 64.5 %
PDW BLD-RTO: 16.7 % (ref 12.4–15.4)
PHOSPHORUS: 2.4 MG/DL (ref 2.5–4.9)
PLATELET # BLD: 367 K/UL (ref 135–450)
PMV BLD AUTO: 8.2 FL (ref 5–10.5)
POTASSIUM SERPL-SCNC: 3.4 MMOL/L (ref 3.5–5.1)
RBC # BLD: 4.73 M/UL (ref 4.2–5.9)
SODIUM BLD-SCNC: 137 MMOL/L (ref 136–145)
VANCOMYCIN TROUGH: 14.1 UG/ML (ref 10–20)
WBC # BLD: 10 K/UL (ref 4–11)

## 2023-02-14 PROCEDURE — 6360000002 HC RX W HCPCS: Performed by: NURSE PRACTITIONER

## 2023-02-14 PROCEDURE — 80069 RENAL FUNCTION PANEL: CPT

## 2023-02-14 PROCEDURE — 6360000002 HC RX W HCPCS: Performed by: INTERNAL MEDICINE

## 2023-02-14 PROCEDURE — 6370000000 HC RX 637 (ALT 250 FOR IP): Performed by: SURGERY

## 2023-02-14 PROCEDURE — 6370000000 HC RX 637 (ALT 250 FOR IP): Performed by: STUDENT IN AN ORGANIZED HEALTH CARE EDUCATION/TRAINING PROGRAM

## 2023-02-14 PROCEDURE — 83735 ASSAY OF MAGNESIUM: CPT

## 2023-02-14 PROCEDURE — 6370000000 HC RX 637 (ALT 250 FOR IP): Performed by: INTERNAL MEDICINE

## 2023-02-14 PROCEDURE — 2580000003 HC RX 258: Performed by: NURSE PRACTITIONER

## 2023-02-14 PROCEDURE — 80202 ASSAY OF VANCOMYCIN: CPT

## 2023-02-14 PROCEDURE — 2580000003 HC RX 258: Performed by: STUDENT IN AN ORGANIZED HEALTH CARE EDUCATION/TRAINING PROGRAM

## 2023-02-14 PROCEDURE — 36415 COLL VENOUS BLD VENIPUNCTURE: CPT

## 2023-02-14 PROCEDURE — 85025 COMPLETE CBC W/AUTO DIFF WBC: CPT

## 2023-02-14 PROCEDURE — 2580000003 HC RX 258: Performed by: INTERNAL MEDICINE

## 2023-02-14 PROCEDURE — 99233 SBSQ HOSP IP/OBS HIGH 50: CPT | Performed by: INTERNAL MEDICINE

## 2023-02-14 RX ORDER — SODIUM CHLORIDE 9 MG/ML
25 INJECTION, SOLUTION INTRAVENOUS PRN
Status: DISCONTINUED | OUTPATIENT
Start: 2023-02-14 | End: 2023-02-14 | Stop reason: HOSPADM

## 2023-02-14 RX ORDER — SODIUM CHLORIDE 0.9 % (FLUSH) 0.9 %
5-40 SYRINGE (ML) INJECTION EVERY 12 HOURS SCHEDULED
Status: DISCONTINUED | OUTPATIENT
Start: 2023-02-14 | End: 2023-02-14 | Stop reason: HOSPADM

## 2023-02-14 RX ORDER — LIDOCAINE HYDROCHLORIDE 10 MG/ML
5 INJECTION, SOLUTION EPIDURAL; INFILTRATION; INTRACAUDAL; PERINEURAL ONCE
Status: DISCONTINUED | OUTPATIENT
Start: 2023-02-14 | End: 2023-02-14 | Stop reason: HOSPADM

## 2023-02-14 RX ORDER — SODIUM CHLORIDE 0.9 % (FLUSH) 0.9 %
5-40 SYRINGE (ML) INJECTION PRN
Status: DISCONTINUED | OUTPATIENT
Start: 2023-02-14 | End: 2023-02-14 | Stop reason: HOSPADM

## 2023-02-14 RX ADMIN — Medication 10 ML: at 08:17

## 2023-02-14 RX ADMIN — POLYETHYLENE GLYCOL 3350 17 G: 17 POWDER, FOR SOLUTION ORAL at 08:17

## 2023-02-14 RX ADMIN — TAMSULOSIN HYDROCHLORIDE 0.4 MG: 0.4 CAPSULE ORAL at 08:17

## 2023-02-14 RX ADMIN — ATORVASTATIN CALCIUM 20 MG: 20 TABLET, FILM COATED ORAL at 08:17

## 2023-02-14 RX ADMIN — ERTAPENEM SODIUM 1000 MG: 1 INJECTION, POWDER, LYOPHILIZED, FOR SOLUTION INTRAMUSCULAR; INTRAVENOUS at 13:57

## 2023-02-14 RX ADMIN — CEFEPIME 2000 MG: 2 INJECTION, POWDER, FOR SOLUTION INTRAVENOUS at 08:15

## 2023-02-14 RX ADMIN — AMLODIPINE BESYLATE 5 MG: 5 TABLET ORAL at 08:17

## 2023-02-14 RX ADMIN — METRONIDAZOLE 500 MG: 500 TABLET ORAL at 06:00

## 2023-02-14 RX ADMIN — METRONIDAZOLE 500 MG: 500 TABLET ORAL at 15:52

## 2023-02-14 RX ADMIN — METOPROLOL SUCCINATE 25 MG: 25 TABLET, EXTENDED RELEASE ORAL at 08:17

## 2023-02-14 NOTE — PROGRESS NOTES
Hospital Medicine Progress Note      Admit Date: 2/7/2023       CC: F/U for abd pain, left sided flank pain     HPI: 26-year-old male with history of PAD, essential hypertension recent aortic aneurysm repair presented to the hospital with abdominal pain, found to have psoas muscle abscess, vascular surgery ID consulted, status post drain still on antibiotics. 2/13: pt with CRISTOFER drain in place with minimal serosang drainage in CRISTOFER. Site unremarkable. Dr. Vana Krabbe seeing patient. Patient says he thinks his infection started from his teeth that he needs to get taken care of    Interval History/Subjective: CRISTOFER with minimal drainage. Only 10 ml emptied from I/O. Review of Systems:       The patient denied headaches, visual changes, LOC, SOB, CP, ABD pain, N/V/D, skin changes, new or worsening weakness or neuromuscular deficits. Comprehensive ROS negative except as mentioned above. Past Medical History:        Diagnosis Date    Hyperlipidemia     Hypertension     PAD (peripheral artery disease) (HCC)     stenting in left leg        Past Surgical History:        Procedure Laterality Date    ABDOMINAL AORTIC ANEURYSM REPAIR, ENDOVASCULAR N/A 1/4/2023    ENDOVASCULAR ABDOMINAL AORTIC ANEURYSM REPAIR, EMBOLIZATION OF RENAL ARTERY performed by Abril Sandoval DO at [address]  2/8/2023    CT VISCERAL PERCUTANEOUS DRAIN 2/8/2023 [facility]    FEMORAL ENDARTERECTOMY Right 1/4/2023    RIGHT FEMORAL ENDARTERECTOMY WITH PATCH performed by Abril Sandoval DO at [address] [facility] [address] Right     rotator cuff    TOE SURGERY Left        Allergies:  Morphine    Past medical and surgical history reviewed. Any changes have been noted.      PHYSICAL EXAM:  BP (!) 162/84   Pulse 65   Temp 97.6 °F (36.4 °C) (Oral)   Resp 18   Ht 6' 2\" (1.88 m)   Wt 183 lb 3.2 oz (83.1 kg)   SpO2 94%   BMI 23.52 kg/m²       Intake/Output Summary (Last 24 hours) at 2/14/2023 1036  Last data filed at 2/14/2023 0703  Gross per 24 hour   Intake 720.66 ml   Output 410 ml   Net 310.66 ml      General appearance:   No apparent distress, appears stated age. Cooperative. HEENT:  Normocephalic, atraumatic. PERRLA. EOMi. Conjunctivae/corneas clear, no icterus, non-injected. Neck: Supple, with full range of motion. No jugular venous distention. Trachea midline. Respiratory:  Normal respiratory effort. Clear to auscultation, bilaterally without Rales/Wheezes/Rhonchi. Cardiovascular:  Regular rate and rhythm without murmurs, rubs or gallops. Abdomen: left flank CRISTOFER drain with serosang drainage; Soft, non-tender, non-distended, without rebound or guarding. Normal bowel sounds. Musculoskeletal:  No clubbing, cyanosis or edema bilaterally. Full range of motion without deformity. Skin: Skin color, texture, turgor normal.  No rashes or lesions. Neurologic:  Neurovascularly intact without any focal sensory/motor deficits. Cranial nerves: II-XII intact, grossly intact. No facial asymmetry, tongue midline. Psychiatric:  Alert and oriented, thought content appropriate  Capillary Refill: Brisk,< 3 seconds   Peripheral Pulses: +2 palpable, equal bilaterally       LABS:    Lab Results   Component Value Date    WBC 10.0 02/14/2023    HGB 12.6 (L) 02/14/2023    HCT 40.4 (L) 02/14/2023    MCV 85.5 02/14/2023     02/14/2023    LYMPHOPCT 22.1 02/14/2023    RBC 4.73 02/14/2023    MCH 26.7 02/14/2023    MCHC 31.3 02/14/2023    RDW 16.7 (H) 02/14/2023       Lab Results   Component Value Date    CREATININE 1.0 02/14/2023    BUN 13 02/14/2023     02/14/2023    K 3.4 (L) 02/14/2023     02/14/2023    CO2 22 02/14/2023       Lab Results   Component Value Date/Time    MG 2.10 02/14/2023 06:34 AM       Lab Results   Component Value Date    ALT 15 01/13/2023    AST 14 (L) 01/13/2023    ALKPHOS 111 01/13/2023    BILITOT <0.2 01/13/2023        No flowsheet data found.     No results found for: LABA1C    Imaging:  CT ABSCESS DRAINAGE W CATH PLACEMENT S&I   Final Result   Successful CT-guided percutaneous drainage catheter placement into a left   iliopsoas fluid collection via a left flank approach. Patient currently has a 10 Hungarian locking pigtail drainage catheter within   the collection, placed to bulb suction. CT GUIDED NEEDLE PLACEMENT   Final Result   Successful CT-guided percutaneous drainage catheter placement into a left   iliopsoas fluid collection via a left flank approach. Patient currently has a 10 Hungarian locking pigtail drainage catheter within   the collection, placed to bulb suction. CT DRAINAGE VISCERAL PERCUTANEOUS   Final Result   Successful CT-guided aspiration of a psoas collection with removal of 5 cc of   old dark serous bloody material sent for analysis as above. CT GUIDED NEEDLE PLACEMENT   Final Result   Successful CT-guided aspiration of a psoas collection with removal of 5 cc of   old dark serous bloody material sent for analysis as above.              Scheduled and prn Medications:    Scheduled Meds:   lidocaine 1 % injection  5 mL IntraDERmal Once    sodium chloride flush  5-40 mL IntraVENous 2 times per day    lidocaine 1 % injection  5 mL IntraDERmal Once    sodium chloride flush  5-40 mL IntraVENous 2 times per day    metroNIDAZOLE  500 mg Oral 3 times per day    cefepime  2,000 mg IntraVENous Q12H    polyethylene glycol  17 g Oral BID    vancomycin  1,500 mg IntraVENous Q18H    vancomycin (VANCOCIN) intermittent dosing (placeholder)   Other RX Placeholder    atorvastatin  20 mg Oral Daily    metoprolol succinate  25 mg Oral Daily    tamsulosin  0.4 mg Oral Daily    amLODIPine  5 mg Oral Daily    sodium chloride flush  5-40 mL IntraVENous 2 times per day    [Held by provider] enoxaparin  40 mg SubCUTAneous Daily     Continuous Infusions:   sodium chloride      sodium chloride      sodium chloride Stopped (02/10/23 0139)     PRN Meds:.sodium chloride flush, sodium chloride, sodium chloride flush, sodium chloride, ondansetron, sodium chloride flush, sodium chloride, acetaminophen **OR** acetaminophen, morphine **OR** morphine, guaiFENesin-dextromethorphan    Assessment & Plan: Moderate malnutrition ([facility] [E44.0]         Priority: Medium    CRP elevated [R79.82]         Priority: Medium    Leukocytosis [D72.829]         Priority: Medium    Elevated erythrocyte sedimentation rate [R70.0]         Priority: Medium    Poor dentition [K08.9]         Priority: Medium    History of abdominal aortic aneurysm (AAA) repair [O57.226]         Priority: Medium    Abdominal pain [R10.9]         Priority: Medium    Psoas abscess, left (HCC) [J17.06]         Priority: Medium    PVD (peripheral vascular disease) ([facility] [I73.9]         Priority: Medium      Possible sepsis, with tachypnea and leukocytosis along with intra-abdominal collection probably abscess, present on admission, IV antibiotics started on admission, vascular surgery and infectious disease consulted, Flagyl added to vancomycin and cefepime.   Status post drain   Left psoas fluid collection due to abscess, status post drain, consider restarting Lovenox when okay with vascular surgery  Aortic aneurysm status postrepair, vascular surgery following questionable CT finding about bleeding, unlikely at this time but Lovenox still on hold, follow-up with vascular surgery and further recommendations on when to restart DVT prophylaxis with Lovenox  Anemia mild, no immediate need for transfusion  Generalized weakness, likely due to above monitor closely  Hematuria, follow-up as outpatient repeat urine analysis per primary care physician and might need to follow-up with urology will defer decision to primary care physician  Essential hypertension, uncontrolled, p.o. medication  Peripheral arterial disease, vascular surgery consulted  Per Dr. Yenni Ramires, keep drain until it stops collecting fluid--likely 4 wks or so. ID to see patient with recs. . F/u Dr. Donato Crespo 2 wks   - PICC and IV antibx per ID recs. : IV vanc 1g daily with stop date 3/7. + oral flagyl 500mg tid inpatient only   Cont IV cefepime inpt only  At d/c change IV ertapenem 1g daily stop date 3/7   F/u with  3 wks      Continue current regimen/therapies. Monitor. Adjust medical regimen as appropriate. Body mass index is 23.52 kg/m². The patient and / or the family were informed of the results of any tests, a time was given to answer questions, a plan was proposed and they agreed with plan. DVT ppx: hold lovenox      Diet: ADULT DIET;  Regular  ADULT ORAL NUTRITION SUPPLEMENT; Breakfast, Lunch, Dinner; Standard High Calorie/High Protein Oral Supplement    Consults:  IP CONSULT TO VASCULAR SURGERY  IP CONSULT TO VASCULAR SURGERY  PHARMACY TO DOSE VANCOMYCIN  IP CONSULT TO INFECTIOUS DISEASES  IP CONSULT TO SPIRITUAL SERVICES  IP CONSULT TO PHARMACY    DISPO/placement plan: pending    Code Status: Full Code      Radha Hassan, LELE - ALVERTO  02/14/23

## 2023-02-14 NOTE — PROGRESS NOTES
[facility]  Progress Note    2/14/2023 10:19 AM    Chief Complaint: Left Flank Pain    Reason for Consult: Left Psoas Abscess    Subjective: Pt seen resting in bed, alert and oriented, reports Left Flank pain continues to improve, CRISTOFER drain in place, no complaints    Vital Signs:  Vitals:    02/13/23 2038 02/14/23 0628 02/14/23 0635 02/14/23 0858   BP: (!) 182/95  (!) 180/98 (!) 162/84   Pulse: 63  72 65   Resp: 16  18 18   Temp: 98.5 °F (36.9 °C)  98 °F (36.7 °C) 97.6 °F (36.4 °C)   TempSrc: Oral  Oral Oral   SpO2: 98%  97% 94%   Weight:  183 lb 3.2 oz (83.1 kg)     Height:           I/O:    Intake/Output Summary (Last 24 hours) at 2/14/2023 1019  Last data filed at 2/14/2023 0703  Gross per 24 hour   Intake 720.66 ml   Output 410 ml   Net 310.66 ml       Physical Exam:   General: no apparent distress, alert and oriented, afebrile  Chest/Lungs: non labored breathing no tachypnea, retractions or cyanosis  Abdomen: soft, nondistended, and nontender, Left Flank area CRISTOFER drain, drainage noted in bulb, turbid/sanguinous in appearance  Extremities: normal strength, tone, and muscle mass, no deformities    Labs:   Lab Results   Component Value Date/Time     02/14/2023 06:34 AM    K 3.4 02/14/2023 06:34 AM    K 3.6 02/07/2023 06:53 PM     02/14/2023 06:34 AM    CO2 22 02/14/2023 06:34 AM    BUN 13 02/14/2023 06:34 AM    CREATININE 1.0 02/14/2023 06:34 AM    GFRAA >60 06/14/2022 08:55 AM    LABGLOM >60 02/14/2023 06:34 AM    GLUCOSE 101 02/14/2023 06:34 AM    PHOS 2.4 02/14/2023 06:34 AM    MG 2.10 02/14/2023 06:34 AM    CALCIUM 9.4 02/14/2023 06:34 AM     Lab Results   Component Value Date/Time    WBC 10.0 02/14/2023 06:34 AM    RBC 4.73 02/14/2023 06:34 AM    HGB 12.6 02/14/2023 06:34 AM    HCT 40.4 02/14/2023 06:34 AM    MCV 85.5 02/14/2023 06:34 AM    RDW 16.7 02/14/2023 06:34 AM     02/14/2023 06:34 AM     Lab Results   Component Value Date    INR 1.21 (H) 02/09/2023 PROTIME 15.3 (H) 02/09/2023        Scheduled Meds:    lidocaine 1 % injection  5 mL IntraDERmal Once    sodium chloride flush  5-40 mL IntraVENous 2 times per day    lidocaine 1 % injection  5 mL IntraDERmal Once    sodium chloride flush  5-40 mL IntraVENous 2 times per day    metroNIDAZOLE  500 mg Oral 3 times per day    cefepime  2,000 mg IntraVENous Q12H    polyethylene glycol  17 g Oral BID    vancomycin  1,500 mg IntraVENous Q18H    vancomycin (VANCOCIN) intermittent dosing (placeholder)   Other RX Placeholder    atorvastatin  20 mg Oral Daily    metoprolol succinate  25 mg Oral Daily    tamsulosin  0.4 mg Oral Daily    amLODIPine  5 mg Oral Daily    sodium chloride flush  5-40 mL IntraVENous 2 times per day    [Held by provider] enoxaparin  40 mg SubCUTAneous Daily     Continuous Infusions:    sodium chloride      sodium chloride      sodium chloride Stopped (02/10/23 0139)       Imaging:  CT Guided Needle Placement - 2/10/2023  Impression  Successful CT-guided percutaneous drainage catheter placement into a left iliopsoas fluid collection via a left flank approach. Patient currently has a 10 Greenlandic locking pigtail drainage catheter within the collection, placed to bulb suction. Assessment:   -Left Flank Pain - improved  -Left Flank bulb drain in place  -Abscess fluid cultures negative thus far    Plan:  -Keep drain in place at discharge - may need to keep in place for 4 weeks or so until fluid drainage decreases  -PICC and IV antibiotics - per ID recommendations  -F/U with Dr. Santo Burnett in 2 weeks for continued evaluation and monitoring    All pertinent information and plan of care discussed with Dr. Katelyn Addison. All questions and concerns were addressed with the patient. I have discussed the above stated plan with the patient and the nurse. The patient verbalized understanding and agreed with the plan.     Thank you for allowing to us to participate in the care of Mónica Lombardo Sr      Electronically signed by LELE Devlin CNP on 2/14/2023 at 10:19 AM

## 2023-02-14 NOTE — PLAN OF CARE
Problem: Pain  Goal: Verbalizes/displays adequate comfort level or baseline comfort level  Outcome: Progressing     Problem: Safety - Adult  Goal: Free from fall injury  Outcome: Progressing     Problem: Discharge Planning  Goal: Discharge to home or other facility with appropriate resources  Outcome: Progressing  Flowsheets (Taken 2/13/2023 2058)  Discharge to home or other facility with appropriate resources: Identify barriers to discharge with patient and caregiver     Problem: Chronic Conditions and Co-morbidities  Goal: Patient's chronic conditions and co-morbidity symptoms are monitored and maintained or improved  Outcome: Progressing  Flowsheets (Taken 2/13/2023 2058)  Care Plan - Patient's Chronic Conditions and Co-Morbidity Symptoms are Monitored and Maintained or Improved: Monitor and assess patient's chronic conditions and comorbid symptoms for stability, deterioration, or improvement     Problem: Nutrition Deficit:  Goal: Optimize nutritional status  Outcome: Progressing  Flowsheets (Taken 2/13/2023 1333 by Desiree Gama, RD, LD)  Nutrient intake appropriate for improving, restoring, or maintaining nutritional needs:   Monitor oral intake, labs, and treatment plans   Recommend appropriate diets, oral nutritional supplements, and vitamin/mineral supplements

## 2023-02-14 NOTE — PLAN OF CARE
Problem: Pain  Goal: Verbalizes/displays adequate comfort level or baseline comfort level  Outcome: Completed     Problem: Safety - Adult  Goal: Free from fall injury  Outcome: Completed     Problem: Discharge Planning  Goal: Discharge to home or other facility with appropriate resources  Outcome: Completed     Problem: Chronic Conditions and Co-morbidities  Goal: Patient's chronic conditions and co-morbidity symptoms are monitored and maintained or improved  Outcome: Completed     Problem: Nutrition Deficit:  Goal: Optimize nutritional status  Outcome: Completed

## 2023-02-14 NOTE — CARE COORDINATION
Case Management Discharge Note          Date / Time of Note: 2/14/2023 1:21 PM                  Patient Name: Jf Mcclain Sr   YOB: 1950  Diagnosis: CRP elevated [R79.82]  Psoas abscess, left ([address] [K68.12]  Leukocytosis, unspecified type [D72.829]   Date / Time: 2/7/2023  6:16 PM    Financial:  Payor: Vidal Metzger / Plan: Rashi 1978 / Product Type: *No Product type* /      Pharmacy:    [address] [facility],[address] Mail Chirag Degroot [facility] [address] [facility] [address]  Phone: [phone] Fax: [phone]    [facility] [address] [phone] - F [phone]  [address] [place] [address]  Phone: [phone] Fax: [phone]    [facility] [address] [facility], [address] [place] [address] A [phone] [facility] [phone]  [facility] [address]  Phone: [phone] Fax: [phone], [address] [place] [address],[address]  [address] [place] [id number]  Phone: [phone] Fax: [phone]      Assistance purchasing medications?: Potential Assistance Purchasing Medications: No  Assistance provided by Case Management: None at this time    DISCHARGE Disposition: Home with [facility]:   Discharging to Facility/ Agency   Name: [facility]  Address:   [address] [place] [address], [address] [place] [address]  Phone:  [phone]  Fax:  [phone]       EMILY Completed: yes    Home Care:  Home Care ordered at discharge: yes  Home Care Agency: [facility]  Phone: [phone]  Fax: [phone]    [facility]  Called scripts to [place], start date tomorrow.       Transportation:  Transportation PLAN for discharge: friend   Mode of Transport: Private Car      Additional CM Notes: denies needs, has PICC, called [facility] for start of care tomorrow, called [facility] with scripts for 2 IVAB through 3/7    The Plan for Transition of Care is related to the following treatment goals of CRP elevated [R79.82]  Psoas abscess, left (HCC) [K68.12]  Leukocytosis, unspecified type [D72.829]    Callie Bowers RN, BSN,   [phone]  Electronically signed by Callie Bowers RN on 2/14/2023 at 1:24 PM

## 2023-02-14 NOTE — PROGRESS NOTES
Order for PICC line. Pre procedure and timeout done with pt's RN. Successful insertion of a Single lumen PICC line into pt's left basilic vein. No issues gaining access or advancing guidewire/introducer/PICC line. PICC tip terminates in the SVC according to [employer] 3CG tip confirmation system. PICC was seen dropping into SVC with tip tracking technology and discernable peaked p waves were noted without negative deflection. A printout will be in pt's chart. PICC is cut at 47 cm and out externally 0cm. Single lumen flushes without resistance and draw back brisk blood return. PICC site CDI with hemostasis maintained and a bio dressing applied to site. Pt instructed to stay in bed and keep arm flat and still for 30 minutes  to promote hemostasis.      Marilee Ruth RN given handoff report

## 2023-02-14 NOTE — PROGRESS NOTES
Clinical Pharmacy Note  Vancomycin Consult    Yobani Rangel is a 81 y.o. male ordered vancomycin for sepsis of unknown origin; consult received from Dr. Vidal Nielsen to manage therapy. Also receiving cefepime and metronidazole. Allergies:  Morphine     Temp max:  Temp (24hrs), Av °F (36.7 °C), Min:97.6 °F (36.4 °C), Max:98.5 °F (36.9 °C)      Recent Labs     23  0703 23  0737 23  0634   WBC 10.0 9.3 10.0         Recent Labs     23  0703 23  0737 23  0634   BUN 13 12 13   CREATININE 0.9 0.9 1.0           Intake/Output Summary (Last 24 hours) at 2023 1628  Last data filed at 2023 0703  Gross per 24 hour   Intake 720.66 ml   Output 410 ml   Net 310.66 ml         Culture Results:  MRSA swab - MRSA not detected  Body fluid/anaerobic cultures - NGTD  Blood - NGTD     Ht Readings from Last 1 Encounters:   23 6' 2\" (1.88 m)          Wt Readings from Last 1 Encounters:   23 183 lb 3.2 oz (83.1 kg)         Estimated Creatinine Clearance: 80 mL/min (based on SCr of 1 mg/dL). Assessment/Plan:  Day # 8 of vancomycin. Current regimen: 1500 mg IV every 18 hours  Goal -600  Vancomycin level 14.1  Predicted     Will continue current regimen    Thank you for the consult.    Fabian Turner, [address] [place] [address], PharmD, 2023 4:29 PM

## 2023-02-14 NOTE — PROGRESS NOTES
Discharge instruction went over with pt, all questions answered. Picc line flushed and covered. New medications and side effects went over with pt, stated understanding. Pt waiting on transportation. Follow up appt and numberts given.  Electronically signed by Joel Rodriguez RN on 2/14/2023 at 4:23 PM

## 2023-02-14 NOTE — DISCHARGE SUMMARY
Hospital Medicine Discharge Summary    Patient ID: Tere Bernal Sr      Patient's PCP: Page Gutierrez MD    Admit Date: 2/7/2023     Discharge Date:   2/14/23    Admitting Physician: Dejah Porter DO     Discharge Physician: Juanita Mobley APRN - CNP       Discharge Diagnoses: Active Hospital Problems    Diagnosis Date Noted    Moderate malnutrition ([address] [E44.0] 02/09/2023     Priority: Medium    CRP elevated [R79.82] 02/09/2023     Priority: Medium    Leukocytosis [D72.829] 02/09/2023     Priority: Medium    Elevated erythrocyte sedimentation rate [R70.0] 02/09/2023     Priority: Medium    Poor dentition [K08.9] 02/09/2023     Priority: Medium    History of abdominal aortic aneurysm (AAA) repair [Z98.890] 02/09/2023     Priority: Medium    Abdominal pain [R10.9] 02/07/2023     Priority: Medium    Psoas abscess, [facility]) [K68.12] 02/07/2023     Priority: Medium    PVD (peripheral vascular disease) ([facility] [address] [I73.9] 01/06/2023     Priority: Medium       The patient was seen and examined on day of discharge and this discharge summary is in conjunction with any daily progress note from day of discharge. Disposition:  [] Home  [x] Home with home health [] Rehab [] Psych [] SNF  [] LTAC  [] Long term nursing home or group home [] Transfer to ICU  [] Transfer to PCU [] Other:    Hospital Course: 72-year-old male with history of PAD, essential hypertension recent aortic aneurysm repair presented to the hospital with abdominal pain, found to have psoas muscle abscess, vascular surgery ID consulted, status post drain still on antibiotics. 2/13: pt with CRISTOFER drain in place with minimal serosang drainage in CRISTOFER. Site unremarkable. Dr. Kam Oswald seeing patient. Patient says he thinks his infection started from his teeth that he needs to get taken care of     Interval History/Subjective: CRISTOFER with minimal drainage. Only 10 ml emptied from I/O.                Moderate malnutrition ([address] [E44.0] Priority: Medium    CRP elevated [R79.82]         Priority: Medium    Leukocytosis [D72.829]         Priority: Medium    Elevated erythrocyte sedimentation rate [R70.0]         Priority: Medium    Poor dentition [K08.9]         Priority: Medium    History of abdominal aortic aneurysm (AAA) repair [Q48.298]         Priority: Medium    Abdominal pain [R10.9]         Priority: Medium    Psoas abscess, left (HCC) [K68.12]         Priority: Medium    PVD (peripheral vascular disease) (HCC) [I73.9]         Priority: Medium      Possible sepsis, with tachypnea and leukocytosis along with intra-abdominal collection probably abscess, present on admission, IV antibiotics started on admission, vascular surgery and infectious disease consulted, Flagyl added to vancomycin and cefepime. Status post drain   - hx. He does have bilateral iliac limbs reconstruction for peripheral vascular disease in the past the endovascular repair of abdominal lactic aneurysm is intact without any leak. Left psoas fluid collection due to abscess, status post drain, consider restarting Lovenox when okay with vascular surgery  Aortic aneurysm status postrepair, vascular surgery following questionable CT finding about bleeding, unlikely at this time but Lovenox still on hold, follow-up with vascular surgery and further recommendations on when to restart DVT prophylaxis with Lovenox  Anemia mild, no immediate need for transfusion  Generalized weakness, likely due to above monitor closely  Hematuria, follow-up as outpatient repeat urine analysis per primary care physician and might need to follow-up with urology will defer decision to primary care physician  Essential hypertension, uncontrolled, p.o. medication  Peripheral arterial disease, vascular surgery consulted  Per Dr. Kendell Mireles, keep drain until it stops collecting fluid--likely 4 wks or so. ID to see patient with recs. . F/u Dr. Kendell Mireles 2 wks   - PICC and IV antibx per ID recs. : IV vanc 1g daily with stop date 3/7. + oral flagyl 500mg tid inpatient only   Cont IV cefepime inpt only  At d/c change IV ertapenem 1g daily stop date 3/7   F/u with  3 wks    Exam:     BP (!) 162/84   Pulse 65   Temp 97.6 °F (36.4 °C) (Oral)   Resp 18   Ht 6' 2\" (1.88 m)   Wt 183 lb 3.2 oz (83.1 kg)   SpO2 94%   BMI 23.52 kg/m²   General appearance:   No apparent distress, appears stated age. Cooperative. HEENT: poor dentition; multiple missing teeth; decayed/broken tooth right upper molar Normocephalic, atraumatic. PERRLA. EOMi. Conjunctivae/corneas clear, no icterus, non-injected. Neck: Supple, with full range of motion. No jugular venous distention. Trachea midline. Respiratory:  Normal respiratory effort. Clear to auscultation, bilaterally without Rales/Wheezes/Rhonchi. Cardiovascular:  Regular rate and rhythm without murmurs, rubs or gallops. Abdomen: left flank CRISTOFER drain with serosang drainage; Soft, non-tender, non-distended, without rebound or guarding. Normal bowel sounds. Musculoskeletal:  No clubbing, cyanosis or edema bilaterally. Full range of motion without deformity. Skin: Skin color, texture, turgor normal.  No rashes or lesions. Neurologic:  Neurovascularly intact without any focal sensory/motor deficits. Cranial nerves: II-XII intact, grossly intact. No facial asymmetry, tongue midline. Psychiatric:  Alert and oriented, thought content appropriate  Capillary Refill: Brisk,< 3 seconds   Peripheral Pulses: +2 palpable, equal bilaterally          Consults:     IP CONSULT TO VASCULAR SURGERY  IP CONSULT TO VASCULAR SURGERY  PHARMACY TO DOSE VANCOMYCIN  IP CONSULT TO INFECTIOUS DISEASES  IP CONSULT TO SPIRITUAL SERVICES  IP CONSULT TO PHARMACY    Diagnostic tests: Imaging:  CT ABSCESS DRAINAGE W CATH PLACEMENT S&I   Final Result   Successful CT-guided percutaneous drainage catheter placement into a left   iliopsoas fluid collection via a left flank approach.        Patient currently has a 10 French locking pigtail drainage catheter within   the collection, placed to bulb suction. CT GUIDED NEEDLE PLACEMENT   Final Result   Successful CT-guided percutaneous drainage catheter placement into a left   iliopsoas fluid collection via a left flank approach. Patient currently has a 10 French locking pigtail drainage catheter within   the collection, placed to bulb suction. CT DRAINAGE VISCERAL PERCUTANEOUS   Final Result   Successful CT-guided aspiration of a psoas collection with removal of 5 cc of   old dark serous bloody material sent for analysis as above. CT GUIDED NEEDLE PLACEMENT   Final Result   Successful CT-guided aspiration of a psoas collection with removal of 5 cc of   old dark serous bloody material sent for analysis as above. Labs: For convenience and continuity at follow-up the following most recent labs are provided:      CBC:    Lab Results   Component Value Date/Time    WBC 10.0 02/14/2023 06:34 AM    HGB 12.6 02/14/2023 06:34 AM    HCT 40.4 02/14/2023 06:34 AM     02/14/2023 06:34 AM       Renal:    Lab Results   Component Value Date/Time     02/14/2023 06:34 AM    K 3.4 02/14/2023 06:34 AM    K 3.6 02/07/2023 06:53 PM     02/14/2023 06:34 AM    CO2 22 02/14/2023 06:34 AM    BUN 13 02/14/2023 06:34 AM    CREATININE 1.0 02/14/2023 06:34 AM    CALCIUM 9.4 02/14/2023 06:34 AM    PHOS 2.4 02/14/2023 06:34 AM           Discharge Instructions/Follow-up:  home antibiotics via PICC with Kathy Montoya on discharge. F/u with Dr. Zenobia Alegria 2 wks.       PCP/SNF to follow up: PCP as needed     D/C condition: stable     Code status: full        Discharge Medications:     Current Discharge Medication List             Details   lisinopril (PRINIVIL;ZESTRIL) 20 MG tablet Take 20 mg by mouth daily      metoprolol succinate (TOPROL XL) 25 MG extended release tablet Take 1 tablet by mouth daily  Qty: 90 tablet, Refills: 1      amLODIPine (NORVASC) 5 MG tablet Take 1 tablet by mouth daily  Qty: 30 tablet, Refills: 3      tiZANidine (ZANAFLEX) 2 MG tablet TAKE 1 TABLET BY MOUTH THREE TIMES DAILY AS NEEDED FOR LOWER BACK PAIN  Qty: 30 tablet, Refills: 1      atorvastatin (LIPITOR) 20 MG tablet TAKE 1 TABLET BY MOUTH DAILY  Qty: 90 tablet, Refills: 1    Associated Diagnoses: Hypercholesteremia      tamsulosin (FLOMAX) 0.4 MG capsule TAKE 1 CAPSULE BY MOUTH DAILY  Qty: 90 capsule, Refills: 1    Associated Diagnoses: Benign prostatic hyperplasia (BPH) with urinary urgency      albuterol sulfate HFA (VENTOLIN HFA) 108 (90 Base) MCG/ACT inhaler Inhale 2 puffs into the lungs 4 times daily as needed for Wheezing  Qty: 18 g, Refills: 5      vitamin B-12 (CYANOCOBALAMIN) 500 MCG tablet Take 1 tablet by mouth daily  Qty: 30 tablet, Refills: 3             Time Spent on discharge is more than 30 minutes in the examination, evaluation, counseling and review of medications and discharge plan. Signed:    LELE Slaughter - CNP   2/14/2023      Thank you Sen Torres MD for the opportunity to be involved in this patient's care. If you have any questions or concerns please feel free to contact me at [phone].

## 2023-02-14 NOTE — PROGRESS NOTES
Infectious Diseases Inpatient Progress  Note        CHIEF COMPLAINT:     Left Psoas fluid collection   AAA repair  ESR ELEVATION   CRP elevation   Left side back pain   PVD          HISTORY OF PRESENT ILLNESS:  81 y.o. man with a significant history for peripheral vascular disease, smoking, hypertension recent abdominal aortic aneurysm repair endoscopy on 1/4/23 now admitted to the hospital secondary to left lower abdominal pain and back pain postoperatively was doing well but recently developed severe pain radiating on the left side was evaluated by primary MD he underwent  CTA of the abdominal aorta on 2/7/23 indicating left psoas fluid collection measuring 3.3 x 2.8 cm this collection was abutting the abdominal aorta representing possible fluid collection seroma versus abscess. He does have bilateral iliac limbs reconstruction for peripheral vascular disease in the past the endovascular repair of abdominal lactic aneurysm is intact without any leak. Admission labs indicate WBC 11.9 hemoglobin 12.3 creatinine 1.1 CRP elevated 70 procalcitonin normal.  He underwent interventional radiology guided aspiration of the fluid collection indicated 5 cc of bloody fluid was sent for culture. Blood cultures are obtained thus far negative we are consulted for antibiotic recommendations.   Location : Left lower quadrant pain and back pain     Quality : Aching      Severity : 10/10  Duration : 1 week     Timing : Constant  Context : Recent abdominal aortic aneurysm repair  Modifying factors : None  Associated signs and symptoms: No fever no chills    INTERVAL HISTORY : s/p IR drain placement lEFT Psoas fluid collection and cx negative\ and CRISTOFER DRAIN working well and no fevers PICC placed for IV abx ESR, CRP still very high and thinks pain is going down and he can handle IV abx at home             Past Medical History:    Past Medical History:   Diagnosis Date    Hyperlipidemia     Hypertension     PAD (peripheral artery disease) ([facility] [address]     stenting in left leg        Past Surgical History:    Past Surgical History:   Procedure Laterality Date    ABDOMINAL AORTIC ANEURYSM REPAIR, ENDOVASCULAR N/A 2023    ENDOVASCULAR ABDOMINAL AORTIC ANEURYSM REPAIR, EMBOLIZATION OF RENAL ARTERY performed by Katelyn Addison DO at [address]  2023    CT VISCERAL PERCUTANEOUS DRAIN 2023 [facility] CT    FEMORAL ENDARTERECTOMY Right 2023    RIGHT FEMORAL ENDARTERECTOMY WITH PATCH performed by Katelyn Addison DO at [address] Right     rotator cuff    TOE SURGERY Left        Current Medications:    Outpatient Medications Marked as Taking for the 23 encounter [facility] HOSPITAL Encounter)   Medication Sig Dispense Refill    lisinopril (PRINIVIL;ZESTRIL) 20 MG tablet Take 20 mg by mouth daily         Allergies:  Morphine    Immunizations :   Immunization History   Administered Date(s) Administered    COVID-19, PFIZER GRAY top, DO NOT Dilute, (age 15 y+), IM, 30 mcg/0.3 mL 2022    COVID-19, PFIZER PURPLE top, DILUTE for use, (age 15 y+), 30mcg/0.3mL 2021, 2021    Influenza Virus Vaccine 2013, 2014, 10/01/2015, 2018    Pneumococcal Conjugate 13-valent (Iygzoeq01) 2018    Pneumococcal Polysaccharide (Ygdprbcnn25) 2015    Tdap (Boostrix, Adacel) 2011         Social History:     Social History     Tobacco Use    Smoking status: Former     Packs/day: 0.25     Years: 38.00     Pack years: 9.50     Types: Cigarettes     Start date:      Quit date: 1/3/2023     Years since quittin.1    Smokeless tobacco: Never    Tobacco comments:     cutting down as high as 3 pk/d to as low . 25    Vaping Use    Vaping Use: Never used   Substance Use Topics    Alcohol use: Yes     Comment: rarely    Drug use: Never     Social History     Tobacco Use   Smoking Status Former    Packs/day: 0.25    Years: 38.00    Pack years: 9.50    Types: Cigarettes Start date:     Quit date: 1/3/2023    Years since quittin.1   Smokeless Tobacco Never   Tobacco Comments    cutting down as high as 3 pk/d to as low . 22       Family History   Problem Relation Age of Onset    Cancer Mother         uterine cancer     Other Father         MVA    Cancer Sister         breast    Cancer Brother         brain     Cancer Maternal Aunt         breast    No Known Problems Maternal Grandmother     No Known Problems Maternal Grandfather     No Known Problems Paternal Grandmother     No Known Problems Paternal Grandfather           REVIEW OF SYSTEMS:      Constitutional:  negative for fevers, chills+ , night sweats  Eyes:  negative for blurred vision, eye discharge, visual disturbance   HEENT:  negative for hearing loss, ear drainage,nasal congestion  Respiratory:  negative for cough, shortness of breath or hemoptysis   Cardiovascular:  negative for chest pain, palpitations, syncope  Gastrointestinal:  negative for nausea, vomiting, diarrhea, constipation, abdominal pain  Genitourinary:  negative for frequency, dysuria, urinary incontinence, hematuria  Hematologic/Lymphatic:  negative for easy bruising, bleeding and lymphadenopathy  Allergic/Immunologic:  negative for recurrent infections, angioedema, anaphylaxis   Endocrine:  negative for weight changes, polyuria, polydipsia and polyphagia  Musculoskeletal:  Left lower back pain + and lower abd pain radiation to the left thigh ++ , swelling, decreased range of motion  Integumentary: No rashes, skin lesions  Neurological:  negative for headaches, slurred speech, unilateral weakness  Psychiatric: negative for hallucinations,confusion,agitation.      PHYSICAL EXAM:      Vitals:  T max  99.6   BP (!) 162/84   Pulse 65   Temp 97.6 °F (36.4 °C) (Oral)   Resp 18   Ht 6' 2\" (1.88 m)   Wt 183 lb 3.2 oz (83.1 kg)   SpO2 94%   BMI 23.52 kg/m²     General Appearance: alert,in no acute distress, ++  pallor, no icterus  Poor dentition++ decayed tooth++   Skin: warm and dry, no rash or erythema  Head: normocephalic and atraumatic  Eyes: pupils equal, round, and reactive to light, conjunctivae normal  ENT: tympanic membrane, external ear and ear canal normal bilaterally, nose without deformity, nasal mucosa and turbinates normal without polyps  Neck: supple and non-tender without mass, no thyromegaly  no cervical lymphadenopathy  Pulmonary/Chest: clear to auscultation bilaterally- no wheezes, rales or rhonchi, normal air movement, no respiratory distress  Cardiovascular: normal rate, regular rhythm, normal S1 and S2, no murmurs, rubs, clicks, or gallops, no carotid bruits  Abdomen: soft, ++ tender, non-distended, normal bowel sounds, no masses or organomegaly  Extremities: no cyanosis, clubbing or edema  Musculoskeletal: normal range of motion, no joint swelling, deformity or tenderness  Integumentary: No rashes, no abnormal skin lesions, no petechiae  Neurologic: reflexes normal and symmetric, no cranial nerve deficit  Psych:  Orientation, sensorium, mood normal   Lines: PICC  Left lower quadrant tender+   CRISTOFER Drain in place Blood tinged fluid+     DATA:    CBC:   Lab Results   Component Value Date    WBC 10.0 02/14/2023    HGB 12.6 (L) 02/14/2023    HCT 40.4 (L) 02/14/2023    MCV 85.5 02/14/2023     02/14/2023     RENAL:   Lab Results   Component Value Date    CREATININE 1.0 02/14/2023    BUN 13 02/14/2023     02/14/2023    K 3.4 (L) 02/14/2023     02/14/2023    CO2 22 02/14/2023     SED RATE:   Lab Results   Component Value Date/Time    SEDRATE 110 02/13/2023 07:37 AM     CK: No results found for: CKTOTAL  CRP:   Lab Results   Component Value Date/Time    CRP 72.1 02/13/2023 07:37 AM     Hepatic Function Panel:   Lab Results   Component Value Date/Time    ALKPHOS 111 01/13/2023 11:13 AM    ALT 15 01/13/2023 11:13 AM    AST 14 01/13/2023 11:13 AM    PROT 6.7 01/13/2023 11:13 AM    BILITOT <0.2 01/13/2023 11:13 AM    LABALBU 3.4 02/14/2023 06:34 AM     UA:  Lab Results   Component Value Date/Time    COLORU Yellow 02/07/2023 06:53 PM    COLORU yellow 02/07/2023 02:09 PM    CLARITYU Clear 02/07/2023 06:53 PM    CLARITYU cloudy 02/07/2023 02:09 PM    GLUCOSEU Negative 02/07/2023 06:53 PM    GLUCOSEU negative 02/07/2023 02:09 PM    BILIRUBINUR Negative 02/07/2023 06:53 PM    BILIRUBINUR negative 02/07/2023 02:09 PM    KETUA Negative 02/07/2023 06:53 PM    KETUA negative 02/07/2023 02:09 PM    SPECGRAV 1.088 02/07/2023 06:53 PM    SPECGRAV <=1.005 02/07/2023 02:09 PM    BLOODU LARGE 02/07/2023 06:53 PM    BLOODU large 02/07/2023 02:09 PM    PHUR 5.0 02/07/2023 06:53 PM    PHUR 6.0 02/07/2023 02:09 PM    PROTEINU 30 02/07/2023 06:53 PM    PROTEINU 30 mg /dl 02/07/2023 02:09 PM    UROBILINOGEN 1.0 02/07/2023 06:53 PM    NITRU Negative 02/07/2023 06:53 PM    LEUKOCYTESUR Negative 02/07/2023 06:53 PM    LEUKOCYTESUR negative 02/07/2023 02:09 PM    LABMICR YES 02/07/2023 06:53 PM    URINETYPE NotGiven 02/07/2023 06:53 PM      Urine Microscopic:   Lab Results   Component Value Date/Time    BACTERIA None Seen 02/07/2023 06:53 PM    HYALCAST 0 02/07/2023 06:53 PM    WBCUA 1 02/07/2023 06:53 PM    RBCUA 8 02/07/2023 06:53 PM    EPIU 1 02/07/2023 06:53 PM     Urine Reflex to Culture:   Lab Results   Component Value Date/Time    URRFLXCULT Not Indicated 02/07/2023 06:53 PM     CRP  72.1     pROCAL 0.06     Wbc  11.9          MICRO: cultures reviewed and updated by me          Culture, Anaerobic and Aerobic [[id number]] Collected: 02/10/23 1145   Order Status: Completed Specimen:  Body Fluid from Abscess Updated: 02/13/23 0930    Gram Stain Result 4+ WBC's (Polymorphonuclear)   No organisms seen     WOUND/ABSCESS No growth 60 to 72 hours    Anaerobic Culture --    Anaerobic culture further report to follow   No anaerobes isolated so far, Further report to follow    Narrative:     ORDER#: [id number]                          ORDERED BY: Margy Briggs SOURCE: Abscess Psoas fluid                COLLECTED:  02/10/23 11:45   ANTIBIOTICS AT KELSI.:                      RECEIVED :  02/10/23 12:41   Culture, Anaerobic [[id number]] Collected: 02/08/23 1601   Order Status: Completed Specimen: Abscess Updated: 02/13/23 0846    Anaerobic Culture No anaerobes isolated   Narrative:     ORDER#: [id number]                          ORDERED BY: JOHNNIE Quintanilla   SOURCE: Abscess                            COLLECTED:  02/08/23 16:01   ANTIBIOTICS AT KELSI.:                      RECEIVED :  02/08/23 18:06   PSOAS FLUID   Culture, Fungus [[id number]] Collected: 02/10/23 1228   Order Status: Sent Specimen: Body Fluid from Abscess Updated: 02/12/23 1014    Fungus Stain No Fungal elements seen   Narrative:     ORDER#: [id number]                          ORDERED BY: Luis Mcdonnell   SOURCE: Abscess Psoas fluid                COLLECTED:  02/10/23 12:28   ANTIBIOTICS AT KELSI.:                      RECEIVED :  02/10/23 12:28   Culture with Smear, Acid Fast Bacillius [[id number]] Collected: 02/10/23 1145   Order Status: Sent Specimen: Body Fluid from Abdomen Updated: 02/12/23 0603    AFB Smear No AFB observed by Fluorescent stain   Narrative:     ORDER#: [id number]                          ORDERED BY: Luis Mcdonnell   SOURCE: Abdomen Psoas fluid                COLLECTED:  02/10/23 11:45   ANTIBIOTICS AT KELIS.:                      RECEIVED :  02/10/23 12:26     Time       MRSA DNA Probe, Nasal [[id number]] Collected: 02/09/23 0900   Order Status: Sent Specimen: Nares Updated: 02/09/23 0911   Culture, Blood 2 [[id number]] Collected: 02/07/23 2212   Order Status: Completed Specimen: Blood Updated: 02/08/23 2315    Culture, Blood 2 No Growth to date. Any change in status will be called.    Narrative:     ORDER#: [id number]                          ORDERED BY: Naheed Acosta   SOURCE: Blood                              COLLECTED:  02/07/23 22:12   ANTIBIOTICS AT KELSI.: RECEIVED :  02/07/23 22:25   If child <=2 yrs old please draw pediatric bottle. ~Blood Culture #2   Culture, Blood 1 [[id number]] Collected: 02/07/23 1853   Order Status: Completed Specimen: Blood Updated: 02/08/23 2115    Blood Culture, Routine No Growth to date. Any change in status will be called. Narrative:     ORDER#: [id number]                          ORDERED BY: Michael Silverio   SOURCE: Blood                              COLLECTED:  02/07/23 18:53   ANTIBIOTICS AT KELSI.:                      RECEIVED :  02/07/23 19:04   If child <=2 yrs old please draw pediatric bottle. ~Blood Culture 1   Culture, Anaerobic [[id number]] Collected: 02/08/23 1601   Order Status: No result Updated: 02/08/23 1818   Culture, Body Fluid [[id number]] Collected: 02/08/23 1601   Order Status: Sent Specimen: Body Fluid Updated: 02/08/23 1801     Blood Culture:   Lab Results   Component Value Date/Time    BC No Growth after 4 days of incubation. 02/07/2023 06:53 PM    BLOODCULT2 No Growth after 4 days of incubation. 02/07/2023 10:12 PM            Culture, Body Fluid [[id number]] Collected: 02/08/23 1601   Order Status: Completed Specimen: Body Fluid Updated: 02/10/23 6120    Body Fluid Culture, Sterile --    No growth to date   No growth 36 to 48 hours     Gram Stain Result No Epithelial Cells seen   4+ WBC's (Polymorphonuclear)   No organisms seen    Narrative:     ORDER#: [id number]                          ORDERED BY: JOHNNIE Arroyo   SOURCE: Fluid psoas                        COLLECTED:  02/08/23 16:01   ANTIBIOTICS AT KELSI.:                      RECEIVED :  02/08/23 18:00   Culture, Anaerobic and Aerobic [[id number]]        Viral Culture:    No results found for: COVID19  Urine Culture: No results for input(s): LABURIN in the last 72 hours.     Scheduled Meds:   lidocaine 1 % injection  5 mL IntraDERmal Once    sodium chloride flush  5-40 mL IntraVENous 2 times per day    lidocaine 1 % injection  5 mL IntraDERmal Once sodium chloride flush  5-40 mL IntraVENous 2 times per day    metroNIDAZOLE  500 mg Oral 3 times per day    cefepime  2,000 mg IntraVENous Q12H    polyethylene glycol  17 g Oral BID    vancomycin  1,500 mg IntraVENous Q18H    vancomycin (VANCOCIN) intermittent dosing (placeholder)   Other RX Placeholder    atorvastatin  20 mg Oral Daily    metoprolol succinate  25 mg Oral Daily    tamsulosin  0.4 mg Oral Daily    amLODIPine  5 mg Oral Daily    sodium chloride flush  5-40 mL IntraVENous 2 times per day    [Held by provider] enoxaparin  40 mg SubCUTAneous Daily       Continuous Infusions:   sodium chloride      sodium chloride      sodium chloride Stopped (02/10/23 0139)       PRN Meds:  sodium chloride flush, sodium chloride, sodium chloride flush, sodium chloride, ondansetron, sodium chloride flush, sodium chloride, acetaminophen **OR** acetaminophen, morphine **OR** morphine, guaiFENesin-dextromethorphan    Imaging:   CT ABSCESS DRAINAGE W CATH PLACEMENT S&I   Final Result   Successful CT-guided percutaneous drainage catheter placement into a left   iliopsoas fluid collection via a left flank approach. Patient currently has a 10 Faroese locking pigtail drainage catheter within   the collection, placed to bulb suction. CT GUIDED NEEDLE PLACEMENT   Final Result   Successful CT-guided percutaneous drainage catheter placement into a left   iliopsoas fluid collection via a left flank approach. Patient currently has a 10 Faroese locking pigtail drainage catheter within   the collection, placed to bulb suction. CT DRAINAGE VISCERAL PERCUTANEOUS   Final Result   Successful CT-guided aspiration of a psoas collection with removal of 5 cc of   old dark serous bloody material sent for analysis as above. CT GUIDED NEEDLE PLACEMENT   Final Result   Successful CT-guided aspiration of a psoas collection with removal of 5 cc of   old dark serous bloody material sent for analysis as above. CT abd/pelvis from 2/7/23       Impression   1. Successful endovascular repair of the abdominal aortic aneurysm. AP   diameter of the abdominal aorta is currently down to 5.5 cm from 6.1 cm   previously. 2. No evidence of endoleaks. 3. Left psoas collection measures 3.3 x 2.8 cm up from 1.4 x 1.7 cm   previously. This collection abuts the aorta and there is concern for   possible infection. 4. Disease at the origins of renal arteries especially on the right and there   is suspicion of a likely infarct in the right kidney. 5. Diverticulosis but no acute diverticulitis. RECOMMENDATIONS:   1. Aspiration of the left psoas fluid collection to rule out abscess. 2. For management of fusiform AAA:   Note: Recommend Vascular consultation if a fusiform AAA enlarges by >0.5 cm   in 6 months or >1 cm in 1 year or for a saccular AAA of any size. References: Mecca Gerry Radiol 2013; 79(11):421-560; J Vasc Surg. 2018; 67:2-77     All pertinent images and reports for the current Hospitalization were reviewed by me.     IMPRESSION:    Patient Active Problem List   Diagnosis    Benign prostatic hyperplasia (BPH) with urinary urgency    Essential hypertension    Hypercholesteremia    Smoker    Abdominal aortic aneurysm (AAA) greater than 5.0 cm in diameter in female    Inflammatory abdominal aortic aneurysm    Aneurysm of infrarenal abdominal aorta    Status post endovascular aneurysm repair (EVAR)    PVD (peripheral vascular disease) (HCC)    Generalized abdominal pain    Smoking    Abdominal pain    Psoas abscess, left (HCC)    Moderate malnutrition (HCC)    CRP elevated    Leukocytosis    Elevated erythrocyte sedimentation rate    Poor dentition    History of abdominal aortic aneurysm (AAA) repair     S/p Endovascular AAA repair emergent on 1/4/23   Endograft is patent no endo leak  Left Psoas fluid collection  3.3  x 2.8  cm' abuts Aorta  H/o PVD and vascular stent lower extremity   ESR, CRP elevation  Some systemic symptoms with Chills and low  grade fevers? Poor dentition   Smoking +  Blood cx -ve  S/p IR aspiration of Left PSOAS FLUID and it was bloody? Fluid cx in process NGTD      Given the presentation some concern for the infectious process but also could be old blood from his prior AAA leak ? As this was see in the past on the previous CTA but was smaller - IR aspiration of the fluid completed and results pending     ESR, CRP elevation is concerning but can be seen in Hematoma or inflammatory process     S/p IR aspiration and  drain placement in the fluid collection and its not clear pus but blood tinged fluid and Cx in process     OPAT d/w pt and plan PICC line as Blood cx negative    ESR, CRP still high - and will trend as out patient       Picc PLACED for IV Abx and he will go home with CRISTOFER drain - follow up with me in x 3  weeks     Labs, Microbiology, Radiology and pertinent results from current hospitalization and care every where were reviewed by me as a part of the consultation. PLAN :  Cont IV Vancomycin change to x 1 gm  Q 24 hr X  stop date x  3/7  At d/c will change to IV Ertapenem x 1 gm Q 24 HR x stop date  3/7  Trend ESR, CRP check    Fluid for cultures Aerobic, Anaerobic, Fungal and AFB in Process SO FAR -ve  bLOOD cx NGTD  Long course of IV abx due to risk and morbidity with not treating   D/w  and we will follow on the cx    EMILY done - follow up with me in x 3  weeks     Side effects d/w pt and opat D/W PT      Discussed with patient/Family and Nursing   Risk of Complications/Morbidity: High      Illness(es)/ Infection present that pose threat to bodily function. There is potential for severe exacerbation of infection/side effects of treatment. Therapy requires intensive monitoring for antimicrobial agent toxicity. Thanks for allowing me to participate in your patient's care please call me with any questions or concerns.     Dr. Pat Pickens MD  [address] [place] [address] Health Physician  Phone: [phone]   Fax : [phone]

## 2023-02-15 ENCOUNTER — CARE COORDINATION (OUTPATIENT)
Dept: CASE MANAGEMENT | Age: 73
End: 2023-02-15

## 2023-02-15 ENCOUNTER — TELEPHONE (OUTPATIENT)
Dept: INFECTIOUS DISEASES | Age: 73
End: 2023-02-15

## 2023-02-15 DIAGNOSIS — K68.12 PSOAS ABSCESS, LEFT (HCC): Primary | ICD-10-CM

## 2023-02-15 LAB
ANAEROBIC CULTURE: NORMAL
GRAM STAIN RESULT: NORMAL
WOUND/ABSCESS: NORMAL

## 2023-02-15 NOTE — CARE COORDINATION
Received return call from Beltran Albert with [facility]. She states Magda De La Cruz received his delivery of medication and supplies today.     Arielle Ibarra RN BSN  Care Transition Nurse  [phone]

## 2023-02-15 NOTE — CARE COORDINATION
[facility] Follow Up Call    Call within 2 business days of discharge: Yes      Patient: Jf Mcclain Sr Patient : 1950   MRN: [id number]  Reason for Admission: Psoas Abscess  Discharge Date: 23 RARS: Readmission Risk Score: 11      Last Discharge [address]       Date Complaint Diagnosis Description Type Department Provider    23 Abdominal Pain Psoas abscess, left ([address] . .. ED to Hosp-Admission (Discharged) (ADMITTED) Jnoo Hills MD; Justin Espino. .. Was this an external facility discharge? No Discharge Facility: [facility] to be reviewed by the provider   Additional needs identified to be addressed with provider: No                 Method of communication with provider: none. Initial attempt at CT discharge phone call. Unable to reach patient. Left message. Contact info provided. Requested return call to CTN. Call placed to [facility]. Spoke with Mack Higginbotham from intake. She states they have the patient referral and the start of care was today. Call placed to [address] [place] [address] Infusion Liaison. Left message. Contact info provided. Requesting return call to CTN. Attempting to confirm delivery of IV antibiotics and supplies.          Care Transitions 24 Hour Call    Care Transitions Interventions           Follow Up  Future Appointments   Date Time Provider Juanito Miner   3/6/2023  1:00 PM DO JACKSON Wagner/GABBY OTERO   2023  8:45 AM Bc Huizar MD Quarterly RN BSN  Care Transition Nurse  [phone]

## 2023-02-16 ENCOUNTER — TELEPHONE (OUTPATIENT)
Dept: SURGERY | Age: 73
End: 2023-02-16

## 2023-02-16 ENCOUNTER — CARE COORDINATION (OUTPATIENT)
Dept: CASE MANAGEMENT | Age: 73
End: 2023-02-16

## 2023-02-16 DIAGNOSIS — K68.12 PSOAS ABSCESS, LEFT (HCC): Primary | ICD-10-CM

## 2023-02-16 PROCEDURE — 1111F DSCHRG MED/CURRENT MED MERGE: CPT

## 2023-02-16 NOTE — TELEPHONE ENCOUNTER
Attempted to call Dr Lizy Monzon office-- no ans and physician line went to . Will continue to call.

## 2023-02-16 NOTE — CARE COORDINATION
[facility] Care Transitions Initial Follow Up Call    Call within 2 business days of discharge: Yes    Patient Current Location: [address] Transition Nurse contacted the patient by telephone to perform post hospital discharge assessment. Verified name and  with patient as identifiers. Provided introduction to self, and explanation of the Care Transition Nurse role. Patient: Violeta David Sr Patient : 1950   MRN: [id number]  Reason for Admission: Psoas Abscess  Discharge Date: 23 RARS: Readmission Risk Score: 11      Last Discharge [address]       Date Complaint Diagnosis Description Type Department Provider    23 Abdominal Pain Psoas abscess, left ([address] . .. ED to Hosp-Admission (Discharged) (ADMITTED) Shane Bianchi MD; Irma Morrison. .. Was this an external facility discharge? No Discharge Facility: [facility] to be reviewed by the provider   Additional needs identified to be addressed with provider: No                 Method of communication with provider: none. 2nd attempt at CT discharge phone call. Cheli Serrano states he is doing \"okay\". He states [facility] is active. He states he did receive his IV antibiotics and supplies from [facility]. Cheli Serrano confirmed his appointment with  on 2023. He states a friend will probably provide his transportation. He states he will be reaching out to [address] [place] [address] and to his PCP to schedule follow up appointments. Cheli Serrano states his B/P this morning = 145/94. He denies any abdominal or back pain at this time. Cheli Serrano denies any fever at this time. He states he is urinating without difficulty. Cheli Serrano states his CRISTOFER drain is intact and draining just a scant amount. Cheli Serrano denies any needs at this time. Care Transition Nurse reviewed discharge instructions with patient who verbalized understanding.  The patient was given an opportunity to ask questions and does not have any further questions or concerns at this time. Were discharge instructions available to patient? Yes. Reviewed appropriate site of care based on symptoms and resources available to patient including: PCP  Specialist  Home health  When to call 911. The patient agrees to contact the PCP office for questions related to their healthcare. Advance Care Planning:   Advance Care Planning   Healthcare Decision Maker:    Primary Decision Maker: Cem Manzano - Brother/Sister - [phone]    Primary Decision Maker: Bertin Connolly - Brother/Sister - [phone]    Secondary Decision Maker: Sara Berg - Other - [phone]    Juan Belcher states he does not currently have a Living Will or Healthcare POA. He states he is working on them at this time. Medication reconciliation was performed with patient, who verbalizes understanding of administration of home medications. Medications reviewed, 1111F entered: yes    Was patient discharged with a pulse oximeter? no    Non-face-to-face services provided:  Obtained and reviewed discharge summary and/or continuity of care documents  Assessment and support for treatment adherence and medication management-medication list reviewed. Care Transitions 24 Hour Call    Do you have a copy of your discharge instructions?: Yes  Do you have all of your prescriptions and are they filled?: Yes  Have you been contacted by a [address]?: No  Have you scheduled your follow up appointment?: Yes  How are you going to get to your appointment?: Car - family or friend to transport  Do you have support at home?: Child  Do you feel like you have everything you need to keep you well at home?: Yes  Care Transitions Interventions         Discussed follow-up appointments. Is follow up appointment scheduled within 7 days of discharge?  No.    Follow Up  Future Appointments   Date Time Provider Juanito Miner   3/6/2023  1:00 PM DO JACKSON Mullen/GABBY OTERO   4/20/2023  8:45 AM Parke Mohs, MD [facility] TIMOTEO CHUA       Care Transition Nurse provided contact information.     Plan for next call:  CRISTOFER drain - IV antibiotics - abdominal & back pain    Michel Linn RN BSN  Care Transition Nurse  [phone]

## 2023-02-16 NOTE — TELEPHONE ENCOUNTER
She is unable to talk to someone in Dr. Chatman office to make an appt. She has left messages that haven't been returned. Call to advise.

## 2023-02-17 NOTE — PROGRESS NOTES
Physician Progress Note      PATIENT:               Haroldo Tierney  CSN #:                  [id number]  :                       1950  ADMIT DATE:       2023 6:16 PM  [address] DATE:        2023 5:18 PM  RESPONDING  PROVIDER #:        Nicky ROYAL - CNP          QUERY TEXT:    Patient admitted with psoas muscle abscess. Noted documentation of possible   sepsis in attending pn on 23. In order to support the diagnosis of sepsis,   please include additional clinical indicators in your documentation. Or   please document if the diagnosis of sepsis has been ruled out after further   study    The medical record reflects the following:  Risk Factors: Current admission for psoas muscle abscess, recent Right Femoral   Endarterectomy with suspicion of aorta being probably infected when he   presented and underwent emergent repair. Clinical Indicators: Afebrile,  RR 24. ... WBC 11.9, CRP 70, BC-no growth, Body   fluid culture- no growth,  Sed rate 103, CT DRG-Successful CT-guided   aspiration of a psoas collection with removal of 5 cc of old dark serous   bloody material.. Lazara Rose Per ID pn on 23- Given the presentation some concern   for the infectious process but also could be old blood from his prior AAA leak   ? As this was see in the past on the previous CTA but was smaller - IR   aspiration of the fluid completed and results pending   ESR, CRP elevation is   concerning but can be seen in Hematoma or inflammatory pr  Treatment: IVF, Cefepime IV, Vanco IV, ID consult, BC's, Fluid cultures,   Flagyl IV    Thank You,  Mariposa Cheng RN BSN CDS CRCR  [email or web address]  Options provided:  -- Sepsis present as evidenced by, Please document evidence.   -- Sepsis was ruled out after study  -- Other - I will add my own diagnosis  -- Disagree - Not applicable / Not valid  -- Disagree - Clinically unable to determine / Unknown  -- Refer to Clinical Documentation Reviewer    PROVIDER RESPONSE TEXT:    Sepsis was ruled out after study.     Query created by: Uri Morales on 2/17/2023 6:24 AM      Electronically signed by:  Ryan Polo CNP 2/17/2023 5:27 PM

## 2023-02-20 ENCOUNTER — TELEPHONE (OUTPATIENT)
Dept: FAMILY MEDICINE CLINIC | Age: 73
End: 2023-02-20

## 2023-02-20 LAB
FUNGUS (MYCOLOGY) CULTURE: NORMAL
FUNGUS STAIN: NORMAL

## 2023-02-20 NOTE — TELEPHONE ENCOUNTER
Pt needs to get his teeth pulled due to infection (stated in hospital report) and [facility] oral surgeon will not schedule him until he gets a referral and letter of medical necessity from you. Awaiting a call back on the surgeons name from pt.

## 2023-02-21 ENCOUNTER — TELEPHONE (OUTPATIENT)
Dept: INFECTIOUS DISEASES | Age: 73
End: 2023-02-21

## 2023-02-21 LAB
AFB CULTURE (MYCOBACTERIA): NORMAL
AFB SMEAR: NORMAL

## 2023-02-21 NOTE — TELEPHONE ENCOUNTER
Pt states he takes Vanc at 0900, but didn't give himself today's dose until labs were drawn at 1015.

## 2023-02-21 NOTE — TELEPHONE ENCOUNTER
VT 5.5, creat 1.0. current dose IV Vancomycin x 1 gm Q 24 hr.  Unable to contact pt or home care agency to confirm if true trough or not.

## 2023-02-21 NOTE — TELEPHONE ENCOUNTER
IF true trough then change to IV Vancomycin x 1250 mg Q 24h as the level is low and check level in 3 days

## 2023-02-22 ENCOUNTER — OFFICE VISIT (OUTPATIENT)
Dept: FAMILY MEDICINE CLINIC | Age: 73
End: 2023-02-22

## 2023-02-22 VITALS
TEMPERATURE: 97.3 F | DIASTOLIC BLOOD PRESSURE: 87 MMHG | WEIGHT: 190 LBS | SYSTOLIC BLOOD PRESSURE: 153 MMHG | HEART RATE: 73 BPM | BODY MASS INDEX: 24.38 KG/M2 | HEIGHT: 74 IN | OXYGEN SATURATION: 97 %

## 2023-02-22 DIAGNOSIS — Z09 HOSPITAL DISCHARGE FOLLOW-UP: ICD-10-CM

## 2023-02-22 DIAGNOSIS — I10 ESSENTIAL HYPERTENSION: ICD-10-CM

## 2023-02-22 DIAGNOSIS — K21.9 GASTROESOPHAGEAL REFLUX DISEASE, UNSPECIFIED WHETHER ESOPHAGITIS PRESENT: ICD-10-CM

## 2023-02-22 DIAGNOSIS — K68.12 PSOAS ABSCESS (HCC): Primary | ICD-10-CM

## 2023-02-22 DIAGNOSIS — I71.43 INFRARENAL ABDOMINAL AORTIC ANEURYSM (AAA) WITHOUT RUPTURE: ICD-10-CM

## 2023-02-22 DIAGNOSIS — K04.7 DENTAL ABSCESS: ICD-10-CM

## 2023-02-22 RX ORDER — LISINOPRIL 20 MG/1
40 TABLET ORAL DAILY
Qty: 90 TABLET | Refills: 1 | Status: SHIPPED | OUTPATIENT
Start: 2023-02-22

## 2023-02-22 RX ORDER — OMEPRAZOLE 20 MG/1
20 CAPSULE, DELAYED RELEASE ORAL
Qty: 30 CAPSULE | Refills: 5 | COMMUNITY
Start: 2023-02-22

## 2023-02-22 RX ORDER — LISINOPRIL 20 MG/1
40 TABLET ORAL DAILY
Qty: 180 TABLET | OUTPATIENT
Start: 2023-02-22

## 2023-02-22 NOTE — PROGRESS NOTES
SUBJECTIVE:  Lalita Garcia is a 81 y.o. male being evaluated for:    Chief Complaint   Patient presents with    Follow-Up from Hospital      Patient is here for hospital followup today . Dental Injury      Patient have the bad teeth and need letter for removal of teeth . HPI   Pain in back groin and stomach is doing better  Continues to have drain in psoas fluid collection   Treated for infection  cultures negative  Continues on Vanco and invanz at home with home halth care    Injection near triple a graft and afraid of co infection  TO have teeth removed   Multipl broken off teeth and bad dentention  tastes terrible     Cloudy blood tinged drainge from his CRISTOFER drain   25 cc over the last 2 days   Allergies   Allergen Reactions    Morphine      Family reports hx of intolerance--patient himself has not had. Family requests we add to profile. Patient has received medication-no complications noted. Current Outpatient Medications   Medication Sig Dispense Refill    vancomycin (VANCOCIN) infusion Infuse 1,250 mg intravenously every 24 hours for 13 days Compound per protocol. 17 g 0    lisinopril (PRINIVIL;ZESTRIL) 20 MG tablet Take 2 tablets by mouth daily 90 tablet 1    omeprazole (PRILOSEC) 20 MG delayed release capsule Take 1 capsule by mouth every morning (before breakfast) 30 capsule 5    ertapenem (INVANZ) infusion Infuse 1,000 mg intravenously every 24 hours for 20 days Compound per protocol.  20 g 0    metoprolol succinate (TOPROL XL) 25 MG extended release tablet Take 1 tablet by mouth daily 90 tablet 1    amLODIPine (NORVASC) 5 MG tablet Take 1 tablet by mouth daily 30 tablet 3    tiZANidine (ZANAFLEX) 2 MG tablet TAKE 1 TABLET BY MOUTH THREE TIMES DAILY AS NEEDED FOR LOWER BACK PAIN (Patient taking differently: Take 2 mg by mouth 3 times daily as needed (lower back pain)) 30 tablet 1    atorvastatin (LIPITOR) 20 MG tablet TAKE 1 TABLET BY MOUTH DAILY 90 tablet 1    tamsulosin (FLOMAX) 0.4 MG capsule TAKE 1 CAPSULE BY MOUTH DAILY 90 capsule 1    albuterol sulfate HFA (VENTOLIN HFA) 108 (90 Base) MCG/ACT inhaler Inhale 2 puffs into the lungs 4 times daily as needed for Wheezing 18 g 5    vitamin B-12 (CYANOCOBALAMIN) 500 MCG tablet Take 1 tablet by mouth daily 30 tablet 3     No current facility-administered medications for this visit. Social History     Socioeconomic History    Marital status: Single     Spouse name: Not on file    Number of children: Not on file    Years of education: Not on file    Highest education level: Not on file   Occupational History    Not on file   Tobacco Use    Smoking status: Former     Packs/day: 0.25     Years: 38.00     Pack years: 9.50     Types: Cigarettes     Start date:      Quit date: 1/3/2023     Years since quittin.1    Smokeless tobacco: Never    Tobacco comments:     cutting down as high as 3 pk/d to as low . 25    Vaping Use    Vaping Use: Never used   Substance and Sexual Activity    Alcohol use: Yes     Comment: rarely    Drug use: Never    Sexual activity: Not on file   Other Topics Concern    Not on file   Social History Narrative    Not on file     Social Determinants of Health     Financial Resource Strain: Low Risk     Difficulty of Paying Living Expenses: Not hard at all   Food Insecurity: No Food Insecurity    Worried About [address] in the Last Year: Never true    [address] in the Last Year: Never true   Transportation Needs: No Transportation Needs    Lack of Transportation (Medical): No    Lack of Transportation (Non-Medical):  No   Physical Activity: Inactive    Days of Exercise per Week: 7 days    Minutes of Exercise per Session: 0 min   Stress: Not on file   Social Connections: Not on file   Intimate Partner Violence: Not on file   Housing Stability: Unknown    Unable to Pay for Housing in the Last Year: Not on file    Number of Places Lived in the Last Year: Not on file    Unstable Housing in the Last Year: No Past Medical History:   Diagnosis Date    Hyperlipidemia     Hypertension     PAD (peripheral artery disease) (HCC)     stenting in left leg      Past Surgical History:   Procedure Laterality Date    ABDOMINAL AORTIC ANEURYSM REPAIR, ENDOVASCULAR N/A 1/4/2023    ENDOVASCULAR ABDOMINAL AORTIC ANEURYSM REPAIR, EMBOLIZATION OF RENAL ARTERY performed by Zoë Botello DO at [address]  2/8/2023    CT VISCERAL PERCUTANEOUS DRAIN 2/8/2023 [facility] CT    FEMORAL ENDARTERECTOMY Right 1/4/2023    RIGHT FEMORAL ENDARTERECTOMY WITH PATCH performed by Zoë Botello DO at [facility] Right     rotator cuff    TOE SURGERY Left        Review of Systems   Constitutional:  Negative for chills and fever. HENT:  Positive for dental problem. Respiratory:  Negative for cough and shortness of breath. Cardiovascular:  Negative for chest pain and palpitations. Gastrointestinal:  Negative for abdominal pain, diarrhea, nausea and vomiting. Genitourinary:  Negative for dysuria. Musculoskeletal:  Positive for back pain (improved). Skin:  Negative for color change. Neurological:  Negative for dizziness, light-headedness and headaches. OBJECTIVE:  BP (!) 153/87 (Site: Left Upper Arm, Position: Sitting, Cuff Size: Medium Adult)   Pulse 73   Temp 97.3 °F (36.3 °C) (Temporal)   Ht 6' 2\" (1.88 m)   Wt 190 lb (86.2 kg)   SpO2 97%   BMI 24.39 kg/m²      Body mass index is 24.39 kg/m². Physical Exam  Constitutional:       General: He is not in acute distress. Appearance: Normal appearance. HENT:      Head: Normocephalic and atraumatic. Eyes:      Conjunctiva/sclera: Conjunctivae normal.   Cardiovascular:      Rate and Rhythm: Normal rate and regular rhythm. Heart sounds: Normal heart sounds. No murmur heard. No gallop. Pulmonary:      Effort: Pulmonary effort is normal.      Breath sounds: Normal breath sounds. No wheezing.    Abdominal: General: Bowel sounds are increased. There is no distension. Palpations: Abdomen is soft. Tenderness: There is no abdominal tenderness (llq). Musculoskeletal:         General: No swelling or tenderness (low back). Cervical back: Neck supple. Lymphadenopathy:      Cervical: No cervical adenopathy. Skin:     General: Skin is warm and dry. Comments: Sinan drain in with brownish discharge    Neurological:      General: No focal deficit present. Mental Status: He is alert. ASSESSMENT/PLAN:    Colonel Hawthorne was seen today for follow-up from hospital and dental injury. Diagnoses and all orders for this visit:    Psoas abscess ([address]  Comments:  remains on strong antibiotics followed by ID  Drain in place     Infrarenal abdominal aortic aneurysm (AAA) without rupture  Comments:  status post repair close to abscess     Dental abscess trying to get in touch with [facility] oral surgery for removal    Comments:  multiple poor dentition  afraid causing infections  Needs teeth removed per vasc and id to protect graft     Essential hypertension  Comments:  resume med   Orders:  -     lisinopril (PRINIVIL;ZESTRIL) 20 MG tablet; Take 2 tablets by mouth daily    Gastroesophageal reflux disease, unspecified whether esophagitis present  -     omeprazole (PRILOSEC) 20 MG delayed release capsule; Take 1 capsule by mouth every morning (before breakfast)      Orders Placed This Encounter   Medications    lisinopril (PRINIVIL;ZESTRIL) 20 MG tablet     Sig: Take 2 tablets by mouth daily     Dispense:  90 tablet     Refill:  1    omeprazole (PRILOSEC) 20 MG delayed release capsule     Sig: Take 1 capsule by mouth every morning (before breakfast)     Dispense:  30 capsule     Refill:  5        Return in about 6 weeks (around 4/3/2023), or if symptoms worsen or fail to improve. There are no Patient Instructions on file for this visit.   Post-Discharge Transitional Care  Follow Up      [address]   Date of Birth:  1950    Date of Office Visit:  2/22/2023  Date of Hospital Admission: 2/7/23  Date of Hospital Discharge: 2/14/23  Risk of hospital readmission (high >=14%. Medium >=10%) :Readmission Risk Score: 11      Care management risk score Rising risk (score 2-5) and Complex Care (Scores >=6): No Risk Score On File     Non face to face  following discharge, date last encounter closed (first attempt may have been earlier): 02/16/2023    Call initiated 2 business days of discharge: Yes    ASSESSMENT/PLAN:   Psoas abscess ([address]  Comments:  remains on strong antibiotics followed by ID  Drain in place   Infrarenal abdominal aortic aneurysm (AAA) without rupture  Comments:  status post repair close to abscess   Dental abscess  Comments:  multiple poor dentition  afraid causing infections  Needs teeth removed per vasc and id to protect graft   Essential hypertension  Comments:  resume med   Orders:  -     lisinopril (PRINIVIL;ZESTRIL) 20 MG tablet; Take 2 tablets by mouth daily, Disp-90 tablet, R-[id number]  Gastroesophageal reflux disease, unspecified whether esophagitis present  -     omeprazole (PRILOSEC) 20 MG delayed release capsule; Take 1 capsule by mouth every morning (before breakfast), Disp-30 capsule, [id number]  Hospital discharge follow-up  -     [place] DISCHARGE MEDS RECONCILED W/ CURRENT OUTPATIENT MED LIST      Medical Decision Making: moderate complexity  Return in 6 weeks (on 4/5/2023). Subjective:   HPI:  Follow up of Hospital problems/diagnosis(es): psoas abscess near triple A graft     Inpatient course: Discharge summary reviewed- see chart.     Interval history/Current status: feeling better  less pain Drain in place and on home iv antibiotics per [facility]     Patient Active Problem List   Diagnosis    Benign prostatic hyperplasia (BPH) with urinary urgency    Essential hypertension    Hypercholesteremia    Smoker    Abdominal aortic aneurysm (AAA) greater than 5.0 cm in diameter in female    Inflammatory abdominal aortic aneurysm    Aneurysm of infrarenal abdominal aorta    Status post endovascular aneurysm repair (EVAR)    PVD (peripheral vascular disease) (HCC)    Generalized abdominal pain    Smoking    Abdominal pain    Psoas abscess, left (HCC)    Moderate malnutrition (HCC)    CRP elevated    Leukocytosis    Elevated erythrocyte sedimentation rate    Poor dentition    History of abdominal aortic aneurysm (AAA) repair       Medications listed as ordered at the time of discharge from hospital     Medication List            Accurate as of February 22, 2023 11:59 PM. If you have any questions, ask your nurse or doctor. START taking these medications      lisinopril 20 MG tablet  Commonly known as: PRINIVIL;ZESTRIL  Take 2 tablets by mouth daily  Started by: Anival Green MD     omeprazole 20 MG delayed release capsule  Commonly known as: PRILOSEC  Take 1 capsule by mouth every morning (before breakfast)  Started by: Anival Green MD            CHANGE how you take these medications      tiZANidine 2 MG tablet  Commonly known as: Ren Hutching 1 TABLET BY MOUTH THREE TIMES DAILY AS NEEDED FOR LOWER BACK PAIN  What changed: See the new instructions. vancomycin  infusion  Commonly known as: VANCOCIN  Infuse 1,250 mg intravenously every 24 hours for 13 days Compound per protocol. What changed: how much to take  Changed by: Joseph Hwang RN            CONTINUE taking these medications      albuterol sulfate  (90 Base) MCG/ACT inhaler  Commonly known as: Ventolin HFA  Inhale 2 puffs into the lungs 4 times daily as needed for Wheezing     amLODIPine 5 MG tablet  Commonly known as: NORVASC  Take 1 tablet by mouth daily     atorvastatin 20 MG tablet  Commonly known as: LIPITOR  TAKE 1 TABLET BY MOUTH DAILY     ertapenem  infusion  Commonly known as: INVanz  Infuse 1,000 mg intravenously every 24 hours for 20 days Compound per protocol.      metoprolol succinate 25 MG extended release tablet  Commonly known as: Toprol XL  Take 1 tablet by mouth daily     tamsulosin 0.4 MG capsule  Commonly known as: FLOMAX  TAKE 1 CAPSULE BY MOUTH DAILY     vitamin B-12 500 MCG tablet  Commonly known as: CYANOCOBALAMIN  Take 1 tablet by mouth daily               Where to Get Your Medications        These medications were sent to [facility], [address] [place] [id number]      Phone: [phone]   lisinopril 20 MG tablet       You can get these medications from any pharmacy    You don't need a prescription for these medications  omeprazole 20 MG delayed release capsule       Information about where to get these medications is not yet available    Ask your nurse or doctor about these medications  vancomycin  infusion           Medications marked \"taking\" at this time  Outpatient Medications Marked as Taking for the 2/22/23 encounter (Office Visit) with Issac Marie MD   Medication Sig Dispense Refill    vancomycin (VANCOCIN) infusion Infuse 1,250 mg intravenously every 24 hours for 13 days Compound per protocol. 17 g 0    lisinopril (PRINIVIL;ZESTRIL) 20 MG tablet Take 2 tablets by mouth daily 90 tablet 1    omeprazole (PRILOSEC) 20 MG delayed release capsule Take 1 capsule by mouth every morning (before breakfast) 30 capsule 5    ertapenem (INVANZ) infusion Infuse 1,000 mg intravenously every 24 hours for 20 days Compound per protocol.  20 g 0    metoprolol succinate (TOPROL XL) 25 MG extended release tablet Take 1 tablet by mouth daily 90 tablet 1    amLODIPine (NORVASC) 5 MG tablet Take 1 tablet by mouth daily 30 tablet 3    tiZANidine (ZANAFLEX) 2 MG tablet TAKE 1 TABLET BY MOUTH THREE TIMES DAILY AS NEEDED FOR LOWER BACK PAIN (Patient taking differently: Take 2 mg by mouth 3 times daily as needed (lower back pain)) 30 tablet 1    atorvastatin (LIPITOR) 20 MG tablet TAKE 1 TABLET BY MOUTH DAILY 90 tablet 1 tamsulosin (FLOMAX) 0.4 MG capsule TAKE 1 CAPSULE BY MOUTH DAILY 90 capsule 1    albuterol sulfate HFA (VENTOLIN HFA) 108 (90 Base) MCG/ACT inhaler Inhale 2 puffs into the lungs 4 times daily as needed for Wheezing 18 g 5    vitamin B-12 (CYANOCOBALAMIN) 500 MCG tablet Take 1 tablet by mouth daily 30 tablet 3        Medications patient taking as of now reconciled against medications ordered at time of hospital discharge: Yes    See above     Objective:    BP (!) 153/87 (Site: Left Upper Arm, Position: Sitting, Cuff Size: Medium Adult)   Pulse 73   Temp 97.3 °F (36.3 °C) (Temporal)   Ht 6' 2\" (1.88 m)   Wt 190 lb (86.2 kg)   SpO2 97%   BMI 24.39 kg/m²   See above       An electronic signature was used to authenticate this note.  --Kranthi Marley MD

## 2023-02-22 NOTE — TELEPHONE ENCOUNTER
Spoke with Brooke Gonzalez pharmacist at [facility], verbalized understanding of MD orders. Left voicemail with med changes on pt's voicemail. Spoke with sister Angelito Maradiaga who verbalized understanding of MD rec's.

## 2023-02-23 ENCOUNTER — TELEPHONE (OUTPATIENT)
Dept: FAMILY MEDICINE CLINIC | Age: 73
End: 2023-02-23

## 2023-02-23 ENCOUNTER — TELEPHONE (OUTPATIENT)
Dept: VASCULAR SURGERY | Age: 73
End: 2023-02-23

## 2023-02-23 ENCOUNTER — CARE COORDINATION (OUTPATIENT)
Dept: CASE MANAGEMENT | Age: 73
End: 2023-02-23

## 2023-02-23 DIAGNOSIS — I71.40 ABDOMINAL AORTIC ANEURYSM (AAA) WITHOUT RUPTURE, UNSPECIFIED PART: ICD-10-CM

## 2023-02-23 DIAGNOSIS — Z98.890 HISTORY OF ABDOMINAL AORTIC ANEURYSM (AAA) REPAIR: Primary | ICD-10-CM

## 2023-02-23 DIAGNOSIS — Z86.79 STATUS POST ENDOVASCULAR ANEURYSM REPAIR (EVAR): ICD-10-CM

## 2023-02-23 DIAGNOSIS — Z98.890 STATUS POST ENDOVASCULAR ANEURYSM REPAIR (EVAR): ICD-10-CM

## 2023-02-23 NOTE — TELEPHONE ENCOUNTER
Susan informed. CTA abd and pelvis scheduled for 3-1-23 at 10:30 am.  NPO 3 hours before. Time was not convenient due to morning IV antibiotics. Susan provided with central scheduling phone number to change the time.

## 2023-02-23 NOTE — CARE COORDINATION
[facility] Care Transitions Follow Up Call    Patient: Derek Page Sr  Patient : 1950   MRN: [id number]  Reason for Admission: Psoas Abscess  Discharge Date: 23 RARS: Readmission Risk Score: 11    Follow Up  Future Appointments   Date Time Provider Juanito Miner   3/6/2023  1:00 PM Fátima Mcgarry DO [place] VASC/EN [facility]   3/8/2023  9:45 AM Jessika Greer MD [place] INF DIS [facility]   2023  8:45 AM Devin Hook MD [facility]         Attempted to contact patient for follow up transition call. Left voicemail message to return call with an update on condition since discharge. Contact information provided. Will continue to follow up.       Care Transitions Subsequent and Final Call    Subsequent and Final Calls  Care Transitions Interventions  Other Interventions:               Kayla Tapia LPN

## 2023-02-23 NOTE — TELEPHONE ENCOUNTER
Called  Oral Surgery to see if one of the oral surgeons would give Dr Radha Benavides a call. There was not an oral surgeon there and that I needed to call this #[phone] and ask to speak with one of on call oral surgeons. I called the # choose the prompt for a physician and was put on hold, after 2 hrs of being on hold I hung up! Called [phone] for  Oral Surgery I spoke with Oscar Geronimo and she transferred me to the Dr to Dr Asa Cervantes #[phone]. I spoke with Joann Orozco she said the on call oral surgeon is Darci Hebert, after trying to reach him they told her theres a different oral surgeon on call, she is trying to get a name and will call us back.

## 2023-02-23 NOTE — TELEPHONE ENCOUNTER
Message on VM to call back. Dr Jin Dean wants pt to have a CTA abd/pelvis prior to his appointment with him on 3-6-23.

## 2023-02-26 ENCOUNTER — TELEPHONE (OUTPATIENT)
Dept: FAMILY MEDICINE CLINIC | Age: 73
End: 2023-02-26

## 2023-02-26 ASSESSMENT — ENCOUNTER SYMPTOMS
BACK PAIN: 1
DIARRHEA: 0
NAUSEA: 0
VOMITING: 0
SHORTNESS OF BREATH: 0
COLOR CHANGE: 0
COUGH: 0
ABDOMINAL PAIN: 0

## 2023-02-26 NOTE — TELEPHONE ENCOUNTER
Need to recall [facility] oral surgery  If yet again unable as 2 previous attempts have failed  ? referral elsewhere and will need to let family know

## 2023-02-27 LAB
A/G RATIO: 1.1 (ref 1.1–2.2)
ALBUMIN SERPL-MCNC: 3.3 G/DL (ref 3.4–5)
ALP BLD-CCNC: 81 U/L (ref 40–129)
ALT SERPL-CCNC: 39 U/L (ref 10–40)
ANION GAP SERPL CALCULATED.3IONS-SCNC: 14 MMOL/L (ref 3–16)
AST SERPL-CCNC: 41 U/L (ref 15–37)
BASOPHILS ABSOLUTE: 0.1 K/UL (ref 0–0.2)
BASOPHILS RELATIVE PERCENT: 1.1 %
BILIRUB SERPL-MCNC: <0.2 MG/DL (ref 0–1)
BUN BLDV-MCNC: 22 MG/DL (ref 7–20)
C-REACTIVE PROTEIN: 32.9 MG/L (ref 0–5.1)
CALCIUM SERPL-MCNC: 9.2 MG/DL (ref 8.3–10.6)
CHLORIDE BLD-SCNC: 105 MMOL/L (ref 99–110)
CO2: 24 MMOL/L (ref 21–32)
CREAT SERPL-MCNC: 1.5 MG/DL (ref 0.8–1.3)
EOSINOPHILS ABSOLUTE: 0.4 K/UL (ref 0–0.6)
EOSINOPHILS RELATIVE PERCENT: 5.2 %
GFR SERPL CREATININE-BSD FRML MDRD: 50 ML/MIN/{1.73_M2}
GLUCOSE BLD-MCNC: 89 MG/DL (ref 70–99)
HCT VFR BLD CALC: 30.3 % (ref 40.5–52.5)
HEMOGLOBIN: 10.1 G/DL (ref 13.5–17.5)
LYMPHOCYTES ABSOLUTE: 1.8 K/UL (ref 1–5.1)
LYMPHOCYTES RELATIVE PERCENT: 21.9 %
MCH RBC QN AUTO: 26.3 PG (ref 26–34)
MCHC RBC AUTO-ENTMCNC: 33.3 G/DL (ref 31–36)
MCV RBC AUTO: 78.9 FL (ref 80–100)
MONOCYTES ABSOLUTE: 0.7 K/UL (ref 0–1.3)
MONOCYTES RELATIVE PERCENT: 9.3 %
NEUTROPHILS ABSOLUTE: 5 K/UL (ref 1.7–7.7)
NEUTROPHILS RELATIVE PERCENT: 62.5 %
PDW BLD-RTO: 16.6 % (ref 12.4–15.4)
PLATELET # BLD: 318 K/UL (ref 135–450)
PMV BLD AUTO: 9 FL (ref 5–10.5)
POTASSIUM SERPL-SCNC: 4.3 MMOL/L (ref 3.5–5.1)
RBC # BLD: 3.84 M/UL (ref 4.2–5.9)
SEDIMENTATION RATE, ERYTHROCYTE: 94 MM/HR (ref 0–20)
SODIUM BLD-SCNC: 143 MMOL/L (ref 136–145)
TOTAL PROTEIN: 6.2 G/DL (ref 6.4–8.2)
VANCOMYCIN TROUGH: 15 UG/ML (ref 10–20)
WBC # BLD: 8 K/UL (ref 4–11)

## 2023-02-27 NOTE — TELEPHONE ENCOUNTER
Jessica Joiner Dr to Dr Dior Rocha, spoke with Hillary Peña she tried reaching Dr Tremaine Seymour and had to leave a message for her to call us.

## 2023-02-28 ENCOUNTER — TELEPHONE (OUTPATIENT)
Dept: INFECTIOUS DISEASES | Age: 73
End: 2023-02-28

## 2023-02-28 DIAGNOSIS — K68.12 PSOAS ABSCESS, LEFT (HCC): Primary | ICD-10-CM

## 2023-02-28 DIAGNOSIS — I71.43 INFRARENAL ABDOMINAL AORTIC ANEURYSM (AAA) WITHOUT RUPTURE: ICD-10-CM

## 2023-02-28 NOTE — TELEPHONE ENCOUNTER
CRP trend down and ESR still high and  Creat mild elevation noted   Reduce the dose of IV Vancomycin to x 1 gm Q 24 HRS can check level and BMP on Thursday-    Also check if Daptomycin can be approved 480 mg q 24 hr then I can switch his Vancomycin to Daptomycin to avoid creat elevation      Thx

## 2023-02-28 NOTE — TELEPHONE ENCOUNTER
Spoke with Kaila Lynn pharmacist at [facility], states Dapto will be approved ,, verbalized understanding to Dc IV Vanc and Vanc trough. Dapto will be started 3/1/23 and CK added to labs. Spoke with pt, p verbalized understanding of ABX switch. Pt states he has already taken today's dose of IV Vanc. Pt verbalized understanding not to take tomorrow mornings dose of IV Vanc.

## 2023-03-01 ENCOUNTER — HOSPITAL ENCOUNTER (OUTPATIENT)
Dept: CT IMAGING | Age: 73
Discharge: HOME | End: 2023-03-01
Payer: MEDICARE

## 2023-03-01 DIAGNOSIS — I71.40 ABDOMINAL AORTIC ANEURYSM (AAA) WITHOUT RUPTURE, UNSPECIFIED PART: ICD-10-CM

## 2023-03-01 DIAGNOSIS — K68.12 PSOAS ABSCESS, LEFT (HCC): ICD-10-CM

## 2023-03-01 DIAGNOSIS — I71.43 INFRARENAL ABDOMINAL AORTIC ANEURYSM (AAA) WITHOUT RUPTURE: ICD-10-CM

## 2023-03-01 DIAGNOSIS — Z86.79 STATUS POST ENDOVASCULAR ANEURYSM REPAIR (EVAR): ICD-10-CM

## 2023-03-01 DIAGNOSIS — Z98.890 STATUS POST ENDOVASCULAR ANEURYSM REPAIR (EVAR): ICD-10-CM

## 2023-03-01 PROCEDURE — 6360000004 HC RX CONTRAST MEDICATION: Performed by: STUDENT IN AN ORGANIZED HEALTH CARE EDUCATION/TRAINING PROGRAM

## 2023-03-01 PROCEDURE — 74174 CTA ABD&PLVS W/CONTRAST: CPT

## 2023-03-01 RX ADMIN — IOPAMIDOL 100 ML: 755 INJECTION, SOLUTION INTRAVENOUS at 11:26

## 2023-03-01 NOTE — TELEPHONE ENCOUNTER
Need to recall  oral surgery  If yet again unable as 2 previous attempts have failed  ? referral elsewhere and will need to let family know. I tried calling  oral surgery on mon 2/27 this was the 3rd attempt with no return call. Pt informed we have made several attempts to have one of the on call oral surgeon at [facility] return our call and no one calls. Pt advised if he finds somewhere else to go and needs a referral to let us know.

## 2023-03-02 ENCOUNTER — CARE COORDINATION (OUTPATIENT)
Dept: CASE MANAGEMENT | Age: 73
End: 2023-03-02

## 2023-03-02 NOTE — CARE COORDINATION
[facility] Care Transitions Follow Up Call    Patient Current Location:  Home: [facility] [address]    Care Transition Nurse contacted the patient by telephone to follow up after admission on 23. Verified name and  with patient as identifiers. Patient: Michael Juarez Sr  Patient : 1950   MRN: [id number]  Reason for Admission: Psoas Abscess  Discharge Date: 23 RARS: Readmission Risk Score: 11      Needs to be reviewed by the provider   Additional needs identified to be addressed with provider: No  none             Method of communication with provider: none. Patient states he is doing better. States appetite has improved. States he since admission he has a 24-15lb weight loss. Denies abdominal pain, fever, chills, n/v. He continues to have a drain for fluid collection. He states fluid is a orange-red in color. He states it no longer cloudy. He continues on two different antibiotics. Kathy Montoya nurse visits once weekly. Patient has a private friend to administer medications twice daily. Reminder for upcoming appointments with vascular and ID. No questions or concerns. Addressed changes since last contact:   Condition status  Discussed follow-up appointments. If no appointment was previously scheduled, appointment scheduling offered: Yes. Follow Up  Future Appointments   Date Time Provider Juanito Miner   3/6/2023  1:00 PM Cristine Palomino DO [facility]   3/8/2023  9:45 AM Mona Daley MD [place] [facility]   2023  8:45 AM Edouard Webb MD [address] Transition Nurse reviewed medical action plan with patient and discussed any barriers to care and/or understanding of plan of care after discharge. Discussed appropriate site of care based on symptoms and resources available to patient including: PCP  Specialist  Home health  When to call 911. The patient agrees to contact the PCP office for questions related to their healthcare.      Advance Care Planning:   reviewed and current.     Patients top risk factors for readmission: medical condition-Psoas abscess  Interventions to address risk factors:  Antibiotic therapy, care of drain, keeping necessary appointments    Offered patient enrollment in the Remote Patient Monitoring (RPM) program for in-home monitoring: NA.     Care Transitions Subsequent and Final Call    Subsequent and Final Calls  Care Transitions Interventions  Other Interventions:             Care Transition Nurse provided contact information for future needs. Plan for follow-up call in 5-7 days based on severity of symptoms and risk factors.  Plan for next call: symptom management-s/s infection, pain, drain care, completion of antibiotic.     Aminata Mckeon RN

## 2023-03-04 ENCOUNTER — HOSPITAL ENCOUNTER (EMERGENCY)
Age: 73
Discharge: HOME | End: 2023-03-04
Attending: EMERGENCY MEDICINE
Payer: MEDICARE

## 2023-03-04 ENCOUNTER — APPOINTMENT (OUTPATIENT)
Dept: CT IMAGING | Age: 73
End: 2023-03-04
Payer: MEDICARE

## 2023-03-04 VITALS
BODY MASS INDEX: 24.05 KG/M2 | WEIGHT: 187.4 LBS | TEMPERATURE: 98 F | OXYGEN SATURATION: 98 % | HEART RATE: 61 BPM | DIASTOLIC BLOOD PRESSURE: 80 MMHG | SYSTOLIC BLOOD PRESSURE: 142 MMHG | HEIGHT: 74 IN | RESPIRATION RATE: 16 BRPM

## 2023-03-04 DIAGNOSIS — M79.601 RIGHT ARM PAIN: Primary | ICD-10-CM

## 2023-03-04 DIAGNOSIS — R60.0 EDEMA OF RIGHT UPPER EXTREMITY: ICD-10-CM

## 2023-03-04 LAB
A/G RATIO: 0.9 (ref 1.1–2.2)
ALBUMIN SERPL-MCNC: 3.4 G/DL (ref 3.4–5)
ALP BLD-CCNC: 94 U/L (ref 40–129)
ALT SERPL-CCNC: 34 U/L (ref 10–40)
ANION GAP SERPL CALCULATED.3IONS-SCNC: 12 MMOL/L (ref 3–16)
APTT: 30.9 SEC (ref 23–34.3)
AST SERPL-CCNC: 41 U/L (ref 15–37)
BASOPHILS ABSOLUTE: 0 K/UL (ref 0–0.2)
BASOPHILS RELATIVE PERCENT: 0.2 %
BILIRUB SERPL-MCNC: 0.3 MG/DL (ref 0–1)
BUN BLDV-MCNC: 24 MG/DL (ref 7–20)
CALCIUM SERPL-MCNC: 9.3 MG/DL (ref 8.3–10.6)
CHLORIDE BLD-SCNC: 106 MMOL/L (ref 99–110)
CO2: 24 MMOL/L (ref 21–32)
CREAT SERPL-MCNC: 1.4 MG/DL (ref 0.8–1.3)
EOSINOPHILS ABSOLUTE: 0.1 K/UL (ref 0–0.6)
EOSINOPHILS RELATIVE PERCENT: 0.5 %
GFR SERPL CREATININE-BSD FRML MDRD: 54 ML/MIN/{1.73_M2}
GLUCOSE BLD-MCNC: 103 MG/DL (ref 70–99)
HCT VFR BLD CALC: 29.7 % (ref 40.5–52.5)
HEMOGLOBIN: 10.2 G/DL (ref 13.5–17.5)
IMMATURE GRANULOCYTES #: 0 K/UL (ref 0–0.2)
IMMATURE GRANULOCYTES %: 0.2 %
INR BLD: 1.12 (ref 0.87–1.14)
LACTIC ACID, SEPSIS: 0.8 MMOL/L (ref 0.4–1.9)
LYMPHOCYTES ABSOLUTE: 1.2 K/UL (ref 1–5.1)
LYMPHOCYTES RELATIVE PERCENT: 11.5 %
MCH RBC QN AUTO: 26.2 PG (ref 26–34)
MCHC RBC AUTO-ENTMCNC: 34.3 G/DL (ref 32–36.4)
MCV RBC AUTO: 76.3 FL (ref 80–100)
MONOCYTES ABSOLUTE: 0.6 K/UL (ref 0–1.3)
MONOCYTES RELATIVE PERCENT: 5.7 %
NEUTROPHILS ABSOLUTE: 8.9 K/UL (ref 1.7–7.7)
NEUTROPHILS RELATIVE PERCENT: 81.9 %
PDW BLD-RTO: 15.7 % (ref 12.4–15.4)
PLATELET # BLD: 297 K/UL (ref 135–450)
PMV BLD AUTO: 9.2 FL (ref 5–10.5)
POTASSIUM SERPL-SCNC: 4.1 MMOL/L (ref 3.5–5.1)
PROTHROMBIN TIME: 14.3 SEC (ref 11.7–14.5)
RBC # BLD: 3.89 M/UL (ref 4.2–5.9)
SODIUM BLD-SCNC: 142 MMOL/L (ref 136–145)
TOTAL CK: 39 U/L (ref 39–308)
TOTAL PROTEIN: 7.3 G/DL (ref 6.4–8.2)
URIC ACID, SERUM: 4.3 MG/DL (ref 3.5–7.2)
WBC # BLD: 10.8 K/UL (ref 4–11)

## 2023-03-04 PROCEDURE — 85025 COMPLETE CBC W/AUTO DIFF WBC: CPT

## 2023-03-04 PROCEDURE — 80053 COMPREHEN METABOLIC PANEL: CPT

## 2023-03-04 PROCEDURE — 6360000004 HC RX CONTRAST MEDICATION: Performed by: EMERGENCY MEDICINE

## 2023-03-04 PROCEDURE — 6370000000 HC RX 637 (ALT 250 FOR IP): Performed by: EMERGENCY MEDICINE

## 2023-03-04 PROCEDURE — 85730 THROMBOPLASTIN TIME PARTIAL: CPT

## 2023-03-04 PROCEDURE — 84550 ASSAY OF BLOOD/URIC ACID: CPT

## 2023-03-04 PROCEDURE — 82550 ASSAY OF CK (CPK): CPT

## 2023-03-04 PROCEDURE — 99284 EMERGENCY DEPT VISIT MOD MDM: CPT

## 2023-03-04 PROCEDURE — 73206 CT ANGIO UPR EXTRM W/O&W/DYE: CPT

## 2023-03-04 PROCEDURE — 36415 COLL VENOUS BLD VENIPUNCTURE: CPT

## 2023-03-04 PROCEDURE — 85610 PROTHROMBIN TIME: CPT

## 2023-03-04 PROCEDURE — 83605 ASSAY OF LACTIC ACID: CPT

## 2023-03-04 RX ORDER — 0.9 % SODIUM CHLORIDE 0.9 %
1000 INTRAVENOUS SOLUTION INTRAVENOUS ONCE
Status: DISCONTINUED | OUTPATIENT
Start: 2023-03-04 | End: 2023-03-04

## 2023-03-04 RX ORDER — HYDROCODONE BITARTRATE AND ACETAMINOPHEN 5; 325 MG/1; MG/1
2 TABLET ORAL ONCE
Status: COMPLETED | OUTPATIENT
Start: 2023-03-04 | End: 2023-03-04

## 2023-03-04 RX ADMIN — HYDROCODONE BITARTRATE AND ACETAMINOPHEN 2 TABLET: 5; 325 TABLET ORAL at 11:16

## 2023-03-04 RX ADMIN — IOPAMIDOL 75 ML: 755 INJECTION, SOLUTION INTRAVENOUS at 12:55

## 2023-03-04 ASSESSMENT — PAIN DESCRIPTION - FREQUENCY
FREQUENCY: CONTINUOUS
FREQUENCY: CONTINUOUS

## 2023-03-04 ASSESSMENT — ENCOUNTER SYMPTOMS
SHORTNESS OF BREATH: 0
ABDOMINAL PAIN: 0
SORE THROAT: 0
NAUSEA: 0
DIARRHEA: 0
CHEST TIGHTNESS: 0
CONSTIPATION: 0
VOMITING: 0

## 2023-03-04 ASSESSMENT — PAIN DESCRIPTION - LOCATION
LOCATION: HAND;ARM
LOCATION: HAND;ARM

## 2023-03-04 ASSESSMENT — PAIN DESCRIPTION - DESCRIPTORS
DESCRIPTORS: ACHING
DESCRIPTORS: ACHING

## 2023-03-04 ASSESSMENT — PAIN DESCRIPTION - PAIN TYPE
TYPE: ACUTE PAIN
TYPE: ACUTE PAIN

## 2023-03-04 ASSESSMENT — PAIN DESCRIPTION - ORIENTATION
ORIENTATION: RIGHT
ORIENTATION: RIGHT

## 2023-03-04 ASSESSMENT — PAIN SCALES - GENERAL
PAINLEVEL_OUTOF10: 8
PAINLEVEL_OUTOF10: 7
PAINLEVEL_OUTOF10: 8

## 2023-03-04 ASSESSMENT — PAIN - FUNCTIONAL ASSESSMENT: PAIN_FUNCTIONAL_ASSESSMENT: 0-10

## 2023-03-04 NOTE — ED NOTES
Dr. Alber Murguia spoke to Dr. Pat Gil at [facility] ED.      Purvi Burr RN  03/04/23 1121

## 2023-03-04 NOTE — ED NOTES
Patient left for [facility] ED with female friend      Dino Mcgill, [facility]  03/04/23 [id number]

## 2023-03-04 NOTE — ED PROVIDER NOTES
[address] [facility]      Pt Name: Ani Hanley  MRN: [id number]  [place] 1950  Date of evaluation: 3/4/2023  Provider: Linda Griffith MD    [address] [place] [address]       Chief Complaint   Patient presents with    Hand Pain    Wrist Pain     C/o pain from right hand to right forearm started yesterday. He denies injury. HISTORY OF PRESENT ILLNESS    Ani Hanley is a 81 y.o. male who  has a past medical history of Hyperlipidemia, Hypertension, and PAD (peripheral artery disease) ([address] who presents to the emergency department as a transfer from hospitals [facility] for a CTA of the right upper extremity which is unable to be performed at the outlying ED secondary to complications with her CT scanner. Patient has swelling of his right upper extremity since the morning. States he also has some pain in the right arm. Patient thought he slept on his arm and may have caused the pain from sleeping wrong. Patient denies any numbness. Patient states he is feeling much better here since has been transferred. Denies any history of DVT or PE. Denies any lower extremity pain or swelling. Limitations to history: None    Nursing Notes were reviewed. Including nursing noted for FM, Surgical History, Past Medical History, Social History, vitals, and allergies; agree with all. REVIEW OF SYSTEMS       Review of Systems   Constitutional:  Negative for chills, diaphoresis and fever. HENT:  Negative for congestion and sore throat. Respiratory:  Negative for chest tightness and shortness of breath. Cardiovascular:  Negative for chest pain, palpitations and leg swelling. Gastrointestinal:  Negative for abdominal pain, constipation, diarrhea, nausea and vomiting. Genitourinary:  Negative for dysuria, frequency and urgency. Musculoskeletal:  Positive for arthralgias. Negative for neck pain and neck stiffness.    Skin:  Negative for rash and wound.   Neurological:  Negative for weakness and numbness. Except as noted above the remainder of the review of systems was reviewed and negative.      PAST MEDICAL HISTORY     Past Medical History:   Diagnosis Date    Hyperlipidemia     Hypertension     PAD (peripheral artery disease) ([facility] [address]     stenting in left leg        SURGICAL HISTORY       Past Surgical History:   Procedure Laterality Date    ABDOMINAL AORTIC ANEURYSM REPAIR, ENDOVASCULAR N/A 1/4/2023    ENDOVASCULAR ABDOMINAL AORTIC ANEURYSM REPAIR, EMBOLIZATION OF RENAL ARTERY performed by Jessica Mcneal DO at [facility]      CT VISCERAL PERCUTANEOUS DRAIN  2/8/2023    CT VISCERAL PERCUTANEOUS DRAIN 2/8/2023 [facility] CT    FEMORAL ENDARTERECTOMY Right 1/4/2023    RIGHT FEMORAL ENDARTERECTOMY WITH PATCH performed by Jessica Mcneal DO at [facility] Right     rotator cuff    TOE SURGERY Left        CURRENT MEDICATIONS       Discharge Medication List as of 3/4/2023  3:37 PM        CONTINUE these medications which have NOT CHANGED    Details   DAPTOmycin (CUBICIN) infusion Infuse 480 mg intravenously every 24 hours for 6 days Compound per protocol., Disp-3 g, R-0NO PRINT      lisinopril (PRINIVIL;ZESTRIL) 20 MG tablet Take 2 tablets by mouth daily, Disp-90 tablet, R-1Normal      omeprazole (PRILOSEC) 20 MG delayed release capsule Take 1 capsule by mouth every morning (before breakfast), Disp-30 capsule, R-5OTC      ertapenem (INVANZ) infusion Infuse 1,000 mg intravenously every 24 hours for 20 days Compound per protocol., Disp-20 g, R-0NO PRINT      metoprolol succinate (TOPROL XL) 25 MG extended release tablet Take 1 tablet by mouth daily, Disp-90 tablet, R-1Normal      amLODIPine (NORVASC) 5 MG tablet Take 1 tablet by mouth daily, Disp-30 tablet, R-3Normal      tiZANidine (ZANAFLEX) 2 MG tablet TAKE 1 TABLET BY MOUTH THREE TIMES DAILY AS NEEDED FOR LOWER BACK PAIN, Disp-30 tablet, R-1Normal      atorvastatin (LIPITOR) 20 MG tablet TAKE 1 TABLET BY MOUTH DAILY, Disp-90 tablet, R-1Normal      tamsulosin (FLOMAX) 0.4 MG capsule TAKE 1 CAPSULE BY MOUTH DAILY, Disp-90 capsule, R-1Normal      albuterol sulfate HFA (VENTOLIN HFA) 108 (90 Base) MCG/ACT inhaler Inhale 2 puffs into the lungs 4 times daily as needed for Wheezing, Disp-18 g, R-5Normal      vitamin B-12 (CYANOCOBALAMIN) 500 MCG tablet Take 1 tablet by mouth daily, Disp-30 tablet,R-3OTC             ALLERGIES     Morphine    FAMILY HISTORY        Family History   Problem Relation Age of Onset    Cancer Mother         uterine cancer     Other Father         MVA    Cancer Sister         breast    Cancer Brother         brain     Cancer Maternal Aunt         breast    No Known Problems Maternal Grandmother     No Known Problems Maternal Grandfather     No Known Problems Paternal Grandmother     No Known Problems Paternal Grandfather        SOCIAL HISTORY       Social History     Socioeconomic History    Marital status: Single     Spouse name: None    Number of children: None    Years of education: None    Highest education level: None   Tobacco Use    Smoking status: Former     Packs/day: 0.25     Years: 38.00     Pack years: 9.50     Types: Cigarettes     Start date:      Quit date: 1/3/2023     Years since quittin.1    Smokeless tobacco: Never    Tobacco comments:     cutting down as high as 3 pk/d to as low . 25    Vaping Use    Vaping Use: Never used   Substance and Sexual Activity    Alcohol use: Yes     Comment: rarely    Drug use: Never     Social Determinants of Health     Financial Resource Strain: Low Risk     Difficulty of Paying Living Expenses: Not hard at all   Food Insecurity: No Food Insecurity    Worried About [address] in the Last Year: Never true    [facility] in the Last Year: Never true   Transportation Needs: No Transportation Needs    Lack of Transportation (Medical): No    Lack of Transportation (Non-Medical):  No   Physical Activity: Inactive    Days of Exercise per Week: 7 days    Minutes of Exercise per Session: 0 min   Housing Stability: Unknown    Unstable Housing in the Last Year: No       PHYSICAL EXAM       ED Triage Vitals [03/04/23 1047]   BP Temp Temp Source Heart Rate Resp SpO2 Height Weight   (!) 147/81 98 °F (36.7 °C) Tympanic 61 16 97 % 6' 2\" (1.88 m) 187 lb 6.4 oz (85 kg)       Physical Exam  Vitals and nursing note reviewed. Constitutional:       General: He is not in acute distress. Appearance: Normal appearance. He is well-developed. He is not diaphoretic. HENT:      Head: Normocephalic and atraumatic. Right Ear: Tympanic membrane normal.      Left Ear: Tympanic membrane normal.      Nose: Nose normal.      Mouth/Throat:      Mouth: Mucous membranes are moist.      Pharynx: Oropharynx is clear. Eyes:      Extraocular Movements: Extraocular movements intact. Conjunctiva/sclera: Conjunctivae normal.      Pupils: Pupils are equal, round, and reactive to light. Cardiovascular:      Rate and Rhythm: Normal rate and regular rhythm. Pulses: Normal pulses. Heart sounds: Normal heart sounds. No murmur heard. No friction rub. No gallop. Pulmonary:      Effort: Pulmonary effort is normal.      Breath sounds: Normal breath sounds. No wheezing, rhonchi or rales. Abdominal:      General: Bowel sounds are normal. There is no distension. Palpations: Abdomen is soft. Tenderness: There is no abdominal tenderness. There is no guarding or rebound. Musculoskeletal:         General: No tenderness. Normal range of motion. Cervical back: Normal range of motion and neck supple. Comments: Radial pulse 2+ bilaterally, mild edema of the right hand, compartment soft right upper extremity, no palpable cord right upper extremity   Skin:     General: Skin is warm and dry. Capillary Refill: Capillary refill takes less than 2 seconds. Findings: No bruising or erythema.    Neurological: General: No focal deficit present. Mental Status: He is alert and oriented to person, place, and time. DIAGNOSTIC RESULTS     EKG: All EKG's are interpreted by the Emergency Department Physician who either signs or Co-signs this chart in the absence of acardiologist.    Interpreted by myself       RADIOLOGY:   Non-plain film images such as CT, Ultrasoundand MRI are read by the radiologist. Plain radiographic images are visualized and preliminarily interpreted by the emergency physician with the below findings:    CTA UPPER EXTREMITY RIGHT W CONTRAST   Preliminary Result   No evidence of major arterial occlusion. Consider venous Doppler ultrasound if there is concern for deep venous   thrombosis. VL Extremity Venous Right    (Results Pending)         ED BEDSIDE ULTRASOUND:   Performed by ED Physician - none    LABS:  Labs Reviewed   CBC WITH AUTO DIFFERENTIAL - Abnormal; Notable for the following components:       Result Value    RBC 3.89 (*)     Hemoglobin 10.2 (*)     Hematocrit 29.7 (*)     MCV 76.3 (*)     RDW 15.7 (*)     Neutrophils Absolute 8.9 (*)     All other components within normal limits   COMPREHENSIVE METABOLIC PANEL - Abnormal; Notable for the following components:    Glucose 103 (*)     BUN 24 (*)     Creatinine 1.4 (*)     Est, Glom Filt Rate 54 (*)     Albumin/Globulin Ratio 0.9 (*)     AST 41 (*)     All other components within normal limits   APTT   PROTIME-INR   CK   LACTATE, SEPSIS   URIC ACID   LACTATE, SEPSIS       All other labs were withinnormal range or not returned as of this dictation. PROCEDURES:  Procedures    EMERGENCY DEPARTMENT COURSE and DIFFERENTIAL DIAGNOSIS/MDM:     PMH, Surgical Hx, FH, Social Hx reviewed by myself (ETOH usage, Tobacco usage, Drug usage reviewed by myself, no pertinent Hx)-  has a past medical history of Hyperlipidemia, Hypertension, and PAD (peripheral artery disease) ([facility] [address]     Old records were reviewed by me     MDM    Patient seen and evaluated. History and physical as above. Nontoxic and afebrile. Right upper extremity pain and mild swelling of the hand. Normal distal perfusion. Patient was sent from outlying ED for CTA of the right upper extremity. Patient has no evidence of infection on his blood work with normal white count and normal lactic acid. Normal CK. Uric acid 4.3. Mild KIRAN with creatinine 1.4 although patient is tolerating oral intake therefore no indication for IV fluids. Electrolytes within normal limits. Coags normal.  CTA of the right upper extremity shows normal vascularity of the right arm. Unfortunately we cannot obtain a venous Doppler of the right upper extremity today because of the weekend and no vascular  available. Plan for outpatient venous Doppler of the right upper extremity as well as close follow-up with patient's vascular surgeon, Dr. Danya Galaviz at his scheduled appointment on Monday. Patient agreeable with care plan. Return instruction provided pedal questions answered prior to discharge. DDX-cellulitis, contusion, neuropathy, DVT, other  Social Determinants (Poverty, Education, uninsured) -none  Prior notes-PMD office's note  Name and route all medications-none  Charting interpretations all by myself-   Diagnosis descriptions-acute right arm pain  Consults-   Disposition- Considered   Is this patient to be included in the SEP-1 Core Measure due to severe sepsis or septic shock? No   Exclusion criteria - the patient is NOT to be included for SEP-1 Core Measure due to:  2+ SIRS criteria are not met    ED Course as of 03/04/23 1547   Sat Mar 04, 2023   1454 Patient's WBC normal at 10.8, hemoglobin stable 10.2. Sodium 142, potassium 4.1. Normal lactic acid 0.8. No concern for infectious cause without any overlying skin changes, erythema or warmth of the right arm compared to the left. Plan for discharge with outpatient follow-up for venous Doppler.   Patient already has a follow-up appointment scheduled with his vascular surgeon this coming Monday. Return instruction provided, all questions answered prior to discharge. [DS]      ED Course User Index  [DS] Sam Fuentes MD         I estimate there is LOW risk for CELLULITIS, COMPARTMENT SYNDROME, NECROTIZING FASCIITIS, TENDON OR NEUROVASCULAR INJURY, or FOREIGN BODY, thus I consider the discharge disposition reasonable. Also, there is no evidence or peritonitis, sepsis, or toxicity. Hero Grier Sr and I have discussed the diagnosis and risks, and we agree with discharging home to follow-up with their primary doctor. We also discussed returning to the Emergency Department immediately if new or worsening symptoms occur. We have discussed the symptoms which are most concerning (e.g., changing or worsening pain, fever, numbness, weakness, cool or painful digits) that necessitate immediate return. I PERSONALLY SAW THE PATIENT AND PERFORMED A SUBSTANTIVE PORTION OF THE VISIT INCLUDING ALL ASPECTS OF THE MEDICAL DECISION MAKING PROCESS. The primary clinician of record Sam Fuentes MD    CRITICAL CARE TIME   Total Critical Caretime was 0 minutes, excluding separately reportable procedures. There was a high probability of clinically significant/life threatening deterioration in the patient's condition which required my urgent intervention. FINAL IMPRESSION      1. Right arm pain    2. Edema of right upper extremity          DISPOSITION/PLAN   DISPOSITION Decision To Discharge 03/04/2023 03:32:10 PM    PATIENT REFERRED TO:  MD Tamiko Peña. [address]  [phone]    Call today  For a follow up appointment. Hilda Garza, [address]  [phone]    Go in 2 days  For a follow up appointment.       DISCHARGE MEDICATIONS:  Discharge Medication List as of 3/4/2023  3:37 PM             (Please note that portions ofthis note were completed with a voice recognition program.  Efforts were made to edit the dictations but occasionally words are mis-transcribed.)    Sandra Kraus MD(electronically signed)  Attending Emergency Physician        Sandra Kraus MD  03/04/23 5480

## 2023-03-04 NOTE — DISCHARGE INSTRUCTIONS
1)  Call [phone] ([phone]) to schedule your Venous Duplex Scan    2)  Continue taking your regular medicine unless told not to    3)  Please return to the emergency department if any of these symptoms occur: Signs of allergic reaction, throat swelling, heavy bleeding from anywhere that wont stop, chest pain, shortness of breath, difficulty breathing, passing out, rectal bleeding, dizziness, & weakness

## 2023-03-04 NOTE — ED NOTES
Right wrist is swollen, red, and painful.  Strong radial pulse    Patient has CRISTOFER drain left side from procedure 2/2023    Patient has left PICC line for home ATB      Jessie Tavera RN  03/04/23 241 Yanique Briseno RN  03/04/23 0074

## 2023-03-04 NOTE — ED NOTES
Blood obtained from left PICC LINE. Flushed with 20 ml normal saline.  Placed a new needleless connector cap and disinfecting cap on PICC line     Hyun Triana RN  03/04/23 9425

## 2023-03-04 NOTE — ED NOTES
Report given Lela,RN charge nurse at [facility] ED     [facility]  03/04/23 4519

## 2023-03-04 NOTE — ED PROVIDER NOTES
[facility]) Emergency Department - Naga Steel MD, am the primary clinician of record. CHIEF COMPLAINT  Chief Complaint   Patient presents with    Hand Pain    Wrist Pain     C/o pain from right hand to right forearm started yesterday. He denies injury. HISTORY OF PRESENT ILLNESS  Yamil Curry is a 81 y.o. male  who presents to the ED complaining of going to bed Thursday night fine but on Friday morning and all day he has had severe worsening pains. He has been soaking it in epsom salts without improvement. Pain isolated to R forearm and hand. Has a LUE PICC line for abx (ertapenem and daptomycin) but has no LUE complaints at all. He says the RUE is swollen compared to the LUE for unclear reason. Has known PAD with stents in the legs in the past because of it and also AAA graft. He is not fully anticoagulated but is on aspirin \"every now and again\" every few days. No other antiplatelet agents. No other complaints, modifying factors or associated symptoms. I have reviewed the following from the nursing documentation.     Past Medical History:   Diagnosis Date    Hyperlipidemia     Hypertension     PAD (peripheral artery disease) (HCC)     stenting in left leg      Past Surgical History:   Procedure Laterality Date    ABDOMINAL AORTIC ANEURYSM REPAIR, ENDOVASCULAR N/A 1/4/2023    ENDOVASCULAR ABDOMINAL AORTIC ANEURYSM REPAIR, EMBOLIZATION OF RENAL ARTERY performed by Isaac Aponte DO at [address]  2/8/2023    CT VISCERAL PERCUTANEOUS DRAIN 2/8/2023 [facility]    FEMORAL ENDARTERECTOMY Right 1/4/2023    RIGHT FEMORAL ENDARTERECTOMY WITH PATCH performed by Isaac Apnote DO at [address] Right     rotator cuff    TOE SURGERY Left      Family History   Problem Relation Age of Onset    Cancer Mother         uterine cancer     Other Father         MVA    Cancer Sister         breast    Cancer Brother brain     Cancer Maternal Aunt         breast    No Known Problems Maternal Grandmother     No Known Problems Maternal Grandfather     No Known Problems Paternal Grandmother     No Known Problems Paternal Grandfather      Social History     Socioeconomic History    Marital status: Single     Spouse name: Not on file    Number of children: Not on file    Years of education: Not on file    Highest education level: Not on file   Occupational History    Not on file   Tobacco Use    Smoking status: Former     Packs/day: 0.25     Years: 38.00     Pack years: 9.50     Types: Cigarettes     Start date:      Quit date: 1/3/2023     Years since quittin.1    Smokeless tobacco: Never    Tobacco comments:     cutting down as high as 3 pk/d to as low . 25    Vaping Use    Vaping Use: Never used   Substance and Sexual Activity    Alcohol use: Yes     Comment: rarely    Drug use: Never    Sexual activity: Not on file   Other Topics Concern    Not on file   Social History Narrative    Not on file     Social Determinants of Health     Financial Resource Strain: Low Risk     Difficulty of Paying Living Expenses: Not hard at all   Food Insecurity: No Food Insecurity    Worried About [employer] in the Last Year: Never true    [facility] in the Last Year: Never true   Transportation Needs: No Transportation Needs    Lack of Transportation (Medical): No    Lack of Transportation (Non-Medical): No   Physical Activity: Inactive    Days of Exercise per Week: 7 days    Minutes of Exercise per Session: 0 min   Stress: Not on file   Social Connections: Not on file   Intimate Partner Violence: Not on file   Housing Stability: Unknown    Unable to Pay for Housing in the Last Year: Not on file    Number of Places Lived in the Last Year: Not on file    Unstable Housing in the Last Year: No     No current facility-administered medications for this encounter.      Current Outpatient Medications   Medication Sig Dispense Refill DAPTOmycin (CUBICIN) infusion Infuse 480 mg intravenously every 24 hours for 6 days Compound per protocol. 3 g 0    lisinopril (PRINIVIL;ZESTRIL) 20 MG tablet Take 2 tablets by mouth daily 90 tablet 1    omeprazole (PRILOSEC) 20 MG delayed release capsule Take 1 capsule by mouth every morning (before breakfast) 30 capsule 5    ertapenem (INVANZ) infusion Infuse 1,000 mg intravenously every 24 hours for 20 days Compound per protocol. 20 g 0    metoprolol succinate (TOPROL XL) 25 MG extended release tablet Take 1 tablet by mouth daily 90 tablet 1    amLODIPine (NORVASC) 5 MG tablet Take 1 tablet by mouth daily 30 tablet 3    tiZANidine (ZANAFLEX) 2 MG tablet TAKE 1 TABLET BY MOUTH THREE TIMES DAILY AS NEEDED FOR LOWER BACK PAIN (Patient taking differently: Take 2 mg by mouth 3 times daily as needed (lower back pain)) 30 tablet 1    atorvastatin (LIPITOR) 20 MG tablet TAKE 1 TABLET BY MOUTH DAILY 90 tablet 1    tamsulosin (FLOMAX) 0.4 MG capsule TAKE 1 CAPSULE BY MOUTH DAILY 90 capsule 1    albuterol sulfate HFA (VENTOLIN HFA) 108 (90 Base) MCG/ACT inhaler Inhale 2 puffs into the lungs 4 times daily as needed for Wheezing 18 g 5    vitamin B-12 (CYANOCOBALAMIN) 500 MCG tablet Take 1 tablet by mouth daily 30 tablet 3     Allergies   Allergen Reactions    Morphine      Family reports hx of intolerance--patient himself has not had. Family requests we add to profile. Patient has received medication-no complications noted. REVIEW OF SYSTEMS  6 systems reviewed, pertinent positives per HPI otherwise noted to be negative    PHYSICAL EXAM   BP (!) 142/80   Pulse 61   Temp 98 °F (36.7 °C) (Tympanic)   Resp 16   Ht 6' 2\" (1.88 m)   Wt 187 lb 6.4 oz (85 kg)   SpO2 98%   BMI 24.06 kg/m²    GENERAL APPEARANCE: Awake and alert. Cooperative. No acute distress. HEAD: Normocephalic. Atraumatic. EYES: PERRL. EOM's grossly intact. ENT: Mucous membranes are moist.   NECK: Supple. Normal ROM.    CHEST: Equal symmetric chest rise. RRR  LUNGS: Breathing is unlabored. Speaking comfortably in full sentences. CTAB  ABDOMEN: Nondistended, nontender  MUSCULOSKELETAL:  RUE: Moderate ttp of the forearm wrist and hand out of proportion to exam, some RUE swelling compared to LUE noted. No upper arm/shoulder tenderness. 2+ radial pulse and ulnar pulse also 2+ despite the discomfort noted by the patient. Brisk cap refill x5 digits. Sensation and motor function fully intact in the radial, ulnar, and median nerve distribution. Full range of motion of all major joints except wrist as limited by pain. Cardinal movements of hand fully intact. No other erythema, bruising, or lacerations. Comparments are soft. LUE: PICC in place, no drainage or erythema. No tenderness. 2+ radial pulse. Brisk cap refill x5 digits. Sensation and motor function fully intact in the radial, ulnar, and median nerve distribution. Full range of motion of all major joints. Cardinal movements of hand fully intact. No erythema, bruising, or lacerations. Comparments are soft. SKIN: Warm and dry. No acute rashes. NEUROLOGICAL: Alert and oriented. Strength is 5/5 in all extremities and sensation is intact. LABS  I have personally reviewed all labs for this visit.    Results for orders placed or performed during the hospital encounter of 03/04/23   CBC with Auto Differential   Result Value Ref Range    WBC 10.8 4.0 - 11.0 K/uL    RBC 3.89 (L) 4.20 - 5.90 M/uL    Hemoglobin 10.2 (L) 13.5 - 17.5 g/dL    Hematocrit 29.7 (L) 40.5 - 52.5 %    MCV 76.3 (L) 80.0 - 100.0 fL    MCH 26.2 26.0 - 34.0 pg    MCHC 34.3 32.0 - 36.4 g/dL    RDW 15.7 (H) 12.4 - 15.4 %    Platelets 649 527 - 895 K/uL    MPV 9.2 5.0 - 10.5 fL    Neutrophils % 81.9 %    Immature Granulocytes % 0.2 %    Lymphocytes % 11.5 %    Monocytes % 5.7 %    Eosinophils % 0.5 %    Basophils % 0.2 %    Neutrophils Absolute 8.9 (H) 1.7 - 7.7 K/uL    Immature Granulocytes # 0.0 0.0 - 0.2 K/uL    Lymphocytes Absolute 1.2 1.0 - 5.1 K/uL    Monocytes Absolute 0.6 0.0 - 1.3 K/uL    Eosinophils Absolute 0.1 0.0 - 0.6 K/uL    Basophils Absolute 0.0 0.0 - 0.2 K/uL   APTT   Result Value Ref Range    aPTT 30.9 23.0 - 34.3 sec   Protime-INR   Result Value Ref Range    Protime 14.3 11.7 - 14.5 sec    INR 1.12 0.87 - 1.14   Lactate, Sepsis   Result Value Ref Range    Lactic Acid, Sepsis 0.8 0.4 - 1.9 mmol/L           EKG performed in ED:  None      RADIOLOGY  Any applicable radiology studies including x-ray, CT, MRI, and/or ultrasound, were reviewed independently by me in addition to the radiologist.  I reviewed all radiology images and reports as well from this evaluation. No imaging performed        ED COURSE/MDM  Patient seen and evaluated. Old records reviewed. Labs and imaging reviewed. After initial evaluation, differential diagnostic considerations included but not limited to: DVT, dependent peripheral edema, CHF, lymphedema, venous stasis, cellulitis, abscess, trauma, PAD    The patient's ED workup was notable for acute nontraumatic arm pain in the right upper extremity mostly over the forearm wrist and hand. There are some swelling and erythema noted to this area but it is diffuse over multiple joints. Gout is considered and uric acid was sent out as a stat lab although the presentation would be unusual for that. Ischemic limb was also considered, even though he has palpable pulses his pain is disproportionate to his evaluation raising concern for peripheral arterial disease as a causative agent of his presentation. In the absence of trauma, x-ray is not likely to be helpful in lieu of CT angiography of the right upper extremity as I do not suspect acute bony abnormality such as fracture. DVT is also considered possible but venous Doppler ultrasound is currently unavailable at this facility and at this time.   He is not anticoagulated or antiplatelet agents currently and only takes aspirin intermittently and sparingly. The patient does not have a fever or leukocytosis and I do not suspect any infection at this point. I do feel that a stat CT angiogram of the right upper extremity is warranted based on his assessment today given his known severe vasculopathy history. I am told that at this facility we are unable to do the CTA runoff imaging study required but [facility] does have this capability so I will transfer the patient from here to the emergency department to obtain the study. The patient has a friend with him who will drive him there, patient is to remain n.p.o. and go directly from here to [facility] ED without delay or detour. They do know where this facility is and do not need directions. The patient was given a dose of oral Norco here. The patient does have a left upper extremity PICC line with but this seems to be functioning fine without complication and has no left upper extremity symptoms today. He does already have vascular follow-up on Monday the 6th regarding his other vascular issues. Is this patient to be included in the SEP-1 Core Measure? No   Exclusion criteria - the patient is NOT to be included for SEP-1 Core Measure due to: Infection is not suspected      Consults:  None    History obtained from: Patient and Friend    Pertinent social determinants of health: None applicable    Chronic conditions potentially affecting care:  PAD    Review of other records:  Inpatient notes from previous visit at this facility from admissions in January 2023 and February 2023    Reassessment:  See MDM for details of medications given and reassessment    During the patient's ED course, the patient was given:  Medications   HYDROcodone-acetaminophen (NORCO) 5-325 MG per tablet 2 tablet (2 tablets Oral Given 3/4/23 1116)        CLINICAL IMPRESSION  1. Right arm pain        Blood pressure (!) 142/80, pulse 61, temperature 98 °F (36.7 °C), temperature source Tympanic, resp.  rate 16, height 6' 2\" (1.88 m), weight 187 lb 6.4 oz (85 kg), SpO2 98 %. [address] was transferred to [facility] ED  in fair condition. The plan is to transfer to  [facility] ED  at this time because  imaging modality not available here (CTA RUE) . Dr. Natacha Whalen accepted the patient and will take over the patient's care upon patient arrival.      Critical Care Time:  None    DISCLAIMER: This chart was created using Dragon dictation software. Efforts were made by me to ensure accuracy, however some errors may be present due to limitations of this technology and occasionally words are not transcribed correctly.         Danie Mcgowan MD  03/04/23 4228

## 2023-03-04 NOTE — ED NOTES
Dr. Alber Murguia states, okay to given patient [facility] and obtain blood. Then send patient to [facility] ED. Dr. Alber Murguia states, okay to send patient by private car.  Patient's friend is driving him     Rhys Cervantes RN  03/04/23 2905

## 2023-03-06 ENCOUNTER — CARE COORDINATION (OUTPATIENT)
Dept: CASE MANAGEMENT | Age: 73
End: 2023-03-06

## 2023-03-06 ENCOUNTER — OFFICE VISIT (OUTPATIENT)
Dept: VASCULAR SURGERY | Age: 73
End: 2023-03-06
Payer: MEDICARE

## 2023-03-06 DIAGNOSIS — K68.19 RETROPERITONEAL ABSCESS (HCC): Primary | ICD-10-CM

## 2023-03-06 DIAGNOSIS — Z98.890 STATUS POST ENDOVASCULAR ANEURYSM REPAIR (EVAR): ICD-10-CM

## 2023-03-06 DIAGNOSIS — Z86.79 STATUS POST ENDOVASCULAR ANEURYSM REPAIR (EVAR): ICD-10-CM

## 2023-03-06 PROCEDURE — 99213 OFFICE O/P EST LOW 20 MIN: CPT | Performed by: STUDENT IN AN ORGANIZED HEALTH CARE EDUCATION/TRAINING PROGRAM

## 2023-03-06 PROCEDURE — 1123F ACP DISCUSS/DSCN MKR DOCD: CPT | Performed by: STUDENT IN AN ORGANIZED HEALTH CARE EDUCATION/TRAINING PROGRAM

## 2023-03-06 NOTE — PROGRESS NOTES
Netta Bernal Sr (:  1950) is a 81 y.o. male,Established patient, here for evaluation of the following chief complaint(s):  Post-Op Check         ASSESSMENT/PLAN:  1. Retroperitoneal abscess ([address]  2. Status post endovascular aneurysm repair (EVAR)    This is a 81year old male who presents for follow up after infection adjacent to EVAR presumably an infected aneurysm was treated conservatively with drain placement and antibiotics. His repeat CTA was personally reviewed, the fluid collection resolved but persistent inflammation around the aneurysm. CRP and sed rate are improving. Will defer to Dr. Vadim Akins for need for further IV antibiotics. Will plan drain removal possibly next week which will be 4 weeks total if indeed not draining anymore. Will plan referrals to oral surgeons as patient has been having trouble establishing for tooth removal which is presumptive source of infection. Follow up CTA 1 month post drain removal. All questions answered. Follow up next week. 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 The drain has been draining less and less. He has about 5-10 cc in the drain this morning and it was last emptied last night around 8 PM. He denies any pain in the groin or back. He still has PICC in place and receiving IV antibiotics. He went to the ER for right hand pain and swelling. Underwent a CTA which was negative; planning duplex of his right arm. No further swelling. Objective   Physical Exam  Cardiovascular:      Rate and Rhythm: Normal rate and regular rhythm. Pulmonary:      Effort: Pulmonary effort is normal. No respiratory distress. Musculoskeletal:      Right lower leg: No edema. Left lower leg: No edema.    Skin:     Comments: Drain in back with turbid thick yellow fluid, no odor Neurological:      Mental Status: He is alert.           Xavier Wang DO, ZEUS, FSVS, [facility] Vascular and Endovascular Surgery

## 2023-03-06 NOTE — CARE COORDINATION
[address] Care Transitions Follow Up Call        Patient: Gina Blunt Sr  Patient : 1950   MRN: [id number]  Reason for Admission: Psoas Abscess & ED visit for right arm pain  Discharge Date: 3/4/23 RARS: Readmission Risk Score: 11      Needs to be reviewed by the provider   Additional needs identified to be addressed with provider: No               Method of communication with provider: none. Initial attempt at ED visit follow up [address] phone call. Unable to reach patient. Left message. Contact info provided. Requested return call to CTN.       Follow Up  Future Appointments   Date Time Provider Juanito Miner   3/8/2023  9:45 AM Jennifer Singh MD [place] [facility]   2023  8:45 AM Armani Mckinney MD [facility] Transitions Subsequent and Final Call    Subsequent and Final Calls  Care Transitions Interventions  Other Interventions:           Arielle Ibarra RN BSN  Care Transition Nurse  [phone]

## 2023-03-07 ENCOUNTER — CARE COORDINATION (OUTPATIENT)
Dept: CASE MANAGEMENT | Age: 73
End: 2023-03-07

## 2023-03-07 LAB
A/G RATIO: 1.1 (ref 1.1–2.2)
ALBUMIN SERPL-MCNC: 3.3 G/DL (ref 3.4–5)
ALP BLD-CCNC: 108 U/L (ref 40–129)
ALT SERPL-CCNC: 40 U/L (ref 10–40)
ANION GAP SERPL CALCULATED.3IONS-SCNC: 13 MMOL/L (ref 3–16)
AST SERPL-CCNC: 45 U/L (ref 15–37)
BASOPHILS ABSOLUTE: 0.1 K/UL (ref 0–0.2)
BASOPHILS RELATIVE PERCENT: 0.8 %
BILIRUB SERPL-MCNC: <0.2 MG/DL (ref 0–1)
BUN BLDV-MCNC: 24 MG/DL (ref 7–20)
C-REACTIVE PROTEIN: 23 MG/L (ref 0–5.1)
CALCIUM SERPL-MCNC: 8.8 MG/DL (ref 8.3–10.6)
CHLORIDE BLD-SCNC: 105 MMOL/L (ref 99–110)
CO2: 24 MMOL/L (ref 21–32)
CREAT SERPL-MCNC: 1.4 MG/DL (ref 0.8–1.3)
EOSINOPHILS ABSOLUTE: 0.5 K/UL (ref 0–0.6)
EOSINOPHILS RELATIVE PERCENT: 5.8 %
GFR SERPL CREATININE-BSD FRML MDRD: 54 ML/MIN/{1.73_M2}
GLUCOSE BLD-MCNC: 74 MG/DL (ref 70–99)
HCT VFR BLD CALC: 31.3 % (ref 40.5–52.5)
HEMOGLOBIN: 10.3 G/DL (ref 13.5–17.5)
LYMPHOCYTES ABSOLUTE: 1.8 K/UL (ref 1–5.1)
LYMPHOCYTES RELATIVE PERCENT: 23.1 %
MCH RBC QN AUTO: 25.8 PG (ref 26–34)
MCHC RBC AUTO-ENTMCNC: 33 G/DL (ref 31–36)
MCV RBC AUTO: 78.2 FL (ref 80–100)
MONOCYTES ABSOLUTE: 0.6 K/UL (ref 0–1.3)
MONOCYTES RELATIVE PERCENT: 7.9 %
NEUTROPHILS ABSOLUTE: 4.9 K/UL (ref 1.7–7.7)
NEUTROPHILS RELATIVE PERCENT: 62.4 %
PDW BLD-RTO: 16.6 % (ref 12.4–15.4)
PLATELET # BLD: 285 K/UL (ref 135–450)
PMV BLD AUTO: 8.5 FL (ref 5–10.5)
POTASSIUM SERPL-SCNC: 4.5 MMOL/L (ref 3.5–5.1)
RBC # BLD: 4 M/UL (ref 4.2–5.9)
SEDIMENTATION RATE, ERYTHROCYTE: 94 MM/HR (ref 0–20)
SODIUM BLD-SCNC: 142 MMOL/L (ref 136–145)
TOTAL CK: 57 U/L (ref 39–308)
TOTAL PROTEIN: 6.3 G/DL (ref 6.4–8.2)
WBC # BLD: 7.8 K/UL (ref 4–11)

## 2023-03-07 NOTE — CARE COORDINATION
[facility] Care Transitions Follow Up Call    Patient Current Location: [address]  Transition Nurse contacted the patient by telephone to follow up after admission on  & recent ED visit. Verified name and  with patient as identifiers. Patient: Jf Mcclain Sr  Patient : 1950   MRN: [id number]  Reason for Admission: Psoas Abscess & right arm pain  Discharge Date: 3/4/23 RARS: Readmission Risk Score: 11      Needs to be reviewed by the provider   Additional needs identified to be addressed with provider: No               Method of communication with provider: none. Yenny Sinclair states he is doing \"alright. \"Melvina states he went to the ED because he was having difficulty moving his right arm. He states he feels he slept on it wrong. Yenny Sinclair states he is currently able to move his fingers, hand, and right arm. He states his wrist is \"aching and swollen. \" Writer offered assistance in getting the venous doppler scheduled. Yenny Sinclair states he will not complete this test at this time. He states \"it is alright now. \" Yenny Sinclair states he had difficulty for a full day then went to the ED. Writer asked if Yenny Sinclair had thought of trying to see the PCP - Yenny Sinclair stated he did not. Yenny Sinclair states he saw Kalen Hawk yesterday. He states [facility] is coming today to care for his PICC line and do his dressing change on his back. He states he is still receiving IV antibiotics. Yenny Sinclair states he sees the ID Physician tomorrow. Yenny Sinclair states his B/P today = 121/81. He denies any abdominal pain or fever. Yenny Sinclair states he is urinating without difficulty. He states his CRISTOFER drain remains intact and draining, but the drainage is decreasing. Yenny Sinclair rates his back pain at 2/10. He denies any needs at this time.       Follow Up  Future Appointments   Date Time Provider Juanito Miner   3/8/2023  9:45 AM Joycelyn Montes De Oca MD [place] [facility]   2023  8:45 AM Bc Huizar MD [address] [facility] Transitions Subsequent and Final Call    Subsequent and Final Calls  Do you have any ongoing symptoms?: No  Have your medications changed?: No  Do you have any questions related to your medications?: No  Do you currently have any active services?: Yes  Are you currently active with any services?: Home Health  Do you have any needs or concerns that I can assist you with?: No  Identified Barriers: None  Care Transitions Interventions  Other Interventions:             Care Transition Nurse provided contact information for future needs.    Plan for next call:  right arm pain - CRISTOFER drain - IV antibiotics    Gudelia Doan RN BSN  Care Transition Nurse  [phone]

## 2023-03-08 ENCOUNTER — OFFICE VISIT (OUTPATIENT)
Dept: INFECTIOUS DISEASES | Age: 73
End: 2023-03-08
Payer: MEDICARE

## 2023-03-08 ENCOUNTER — TELEPHONE (OUTPATIENT)
Dept: VASCULAR SURGERY | Age: 73
End: 2023-03-08

## 2023-03-08 VITALS
WEIGHT: 189.2 LBS | SYSTOLIC BLOOD PRESSURE: 120 MMHG | OXYGEN SATURATION: 98 % | TEMPERATURE: 96.9 F | HEIGHT: 74 IN | HEART RATE: 65 BPM | BODY MASS INDEX: 24.28 KG/M2 | DIASTOLIC BLOOD PRESSURE: 69 MMHG

## 2023-03-08 DIAGNOSIS — R70.0 ELEVATED ERYTHROCYTE SEDIMENTATION RATE: ICD-10-CM

## 2023-03-08 DIAGNOSIS — Z79.2 RECEIVING INTRAVENOUS ANTIBIOTIC TREATMENT AS OUTPATIENT: ICD-10-CM

## 2023-03-08 DIAGNOSIS — Z86.79 STATUS POST ENDOVASCULAR ANEURYSM REPAIR (EVAR): ICD-10-CM

## 2023-03-08 DIAGNOSIS — Z98.890 HISTORY OF ABDOMINAL AORTIC ANEURYSM (AAA) REPAIR: ICD-10-CM

## 2023-03-08 DIAGNOSIS — I71.33 RUPTURED INFRARENAL ABDOMINAL AORTIC ANEURYSM (AAA): ICD-10-CM

## 2023-03-08 DIAGNOSIS — K08.9 POOR DENTITION: Primary | ICD-10-CM

## 2023-03-08 DIAGNOSIS — I71.40: Primary | ICD-10-CM

## 2023-03-08 DIAGNOSIS — K68.12 PSOAS ABSCESS, LEFT (HCC): ICD-10-CM

## 2023-03-08 DIAGNOSIS — R79.82 CRP ELEVATED: ICD-10-CM

## 2023-03-08 DIAGNOSIS — Z98.890 STATUS POST ENDOVASCULAR ANEURYSM REPAIR (EVAR): ICD-10-CM

## 2023-03-08 PROCEDURE — 1123F ACP DISCUSS/DSCN MKR DOCD: CPT | Performed by: INTERNAL MEDICINE

## 2023-03-08 PROCEDURE — 3078F DIAST BP <80 MM HG: CPT | Performed by: INTERNAL MEDICINE

## 2023-03-08 PROCEDURE — 3074F SYST BP LT 130 MM HG: CPT | Performed by: INTERNAL MEDICINE

## 2023-03-08 PROCEDURE — 99215 OFFICE O/P EST HI 40 MIN: CPT | Performed by: INTERNAL MEDICINE

## 2023-03-08 RX ORDER — AMOXICILLIN AND CLAVULANATE POTASSIUM 875; 125 MG/1; MG/1
1 TABLET, FILM COATED ORAL 2 TIMES DAILY
Qty: 60 TABLET | Refills: 6 | Status: SHIPPED | OUTPATIENT
Start: 2023-03-08 | End: 2023-04-07

## 2023-03-08 NOTE — PROGRESS NOTES
Infectious Diseases Outpatient Follow-up Note        Primary Care Physician:  Rere Harley MD       History Obtained From:   Patient, EPIC    CHIEF COMPLAINT / ID problem:    Chief Complaint   Patient presents with    Follow-up     Left Psoas fluid collection -nk       HISTORY OF PRESENT ILLNESS / Interval history:    Here for follow up Left Psoas fluid collection in the setting of Aortitis and AAA endovascular repair emergent on 1/423 and was readmit on 2/7/23 with back pain and chills and CTA with concern for fluid collection along the Psoas area with possible inflammation along the Aorta and cx negative but ESR CRP high and fluid was concerning for infection hence sent out on IV Vancomycin and IV Ertapenem he has poor dentition awaiting extractions. His back pain is better no leg weakness CRISTOFER drain in place not putting out much fluid has seen  in follow up - ESR, CRP now trend down no fevers no chills, picc working ok - repeat CTA from 3/2/23 improved and not much fluid collection noted. He did have some elevation in creat and his IV Vancomycin was switched to IV Daptomycin for the remainder of the course of treatment and has been tolerating this well.      Past Medical History:    Past Medical History:   Diagnosis Date    Hyperlipidemia     Hypertension     PAD (peripheral artery disease) (HCC)     stenting in left leg        Past Surgical History:    Past Surgical History:   Procedure Laterality Date    ABDOMINAL AORTIC ANEURYSM REPAIR, ENDOVASCULAR N/A 1/4/2023    ENDOVASCULAR ABDOMINAL AORTIC ANEURYSM REPAIR, EMBOLIZATION OF RENAL ARTERY performed by Refugio Barbosa DO at [address]  2/8/2023    CT VISCERAL PERCUTANEOUS DRAIN 2/8/2023 [facility]    FEMORAL ENDARTERECTOMY Right 1/4/2023    RIGHT FEMORAL ENDARTERECTOMY WITH PATCH performed by Refugio Barbosa DO at [facility] Right     rotator cuff    TOE SURGERY Left        Current Medications:    Current Outpatient Medications   Medication Sig Dispense Refill    amoxicillin-clavulanate (AUGMENTIN) 875-125 MG per tablet Take 1 tablet by mouth 2 times daily 60 tablet 6    lisinopril (PRINIVIL;ZESTRIL) 20 MG tablet Take 2 tablets by mouth daily 90 tablet 1    omeprazole (PRILOSEC) 20 MG delayed release capsule Take 1 capsule by mouth every morning (before breakfast) 30 capsule 5    metoprolol succinate (TOPROL XL) 25 MG extended release tablet Take 1 tablet by mouth daily 90 tablet 1    amLODIPine (NORVASC) 5 MG tablet Take 1 tablet by mouth daily 30 tablet 3    tiZANidine (ZANAFLEX) 2 MG tablet TAKE 1 TABLET BY MOUTH THREE TIMES DAILY AS NEEDED FOR LOWER BACK PAIN (Patient taking differently: Take 2 mg by mouth 3 times daily as needed (lower back pain)) 30 tablet 1    atorvastatin (LIPITOR) 20 MG tablet TAKE 1 TABLET BY MOUTH DAILY 90 tablet 1    tamsulosin (FLOMAX) 0.4 MG capsule TAKE 1 CAPSULE BY MOUTH DAILY 90 capsule 1    albuterol sulfate HFA (VENTOLIN HFA) 108 (90 Base) MCG/ACT inhaler Inhale 2 puffs into the lungs 4 times daily as needed for Wheezing 18 g 5    vitamin B-12 (CYANOCOBALAMIN) 500 MCG tablet Take 1 tablet by mouth daily 30 tablet 3     No current facility-administered medications for this visit.        Allergies:  Morphine      Immunizations :   Immunization History   Administered Date(s) Administered    COVID-19, PFIZER GRAY top, DO NOT Dilute, (age 15 y+), IM, 30 mcg/0.3 mL 02/08/2022    COVID-19, PFIZER PURPLE top, DILUTE for use, (age 15 y+), 30mcg/0.3mL 04/01/2021, 04/22/2021    Influenza Virus Vaccine 11/19/2013, 09/18/2014, 10/01/2015, 02/12/2018    Pneumococcal Conjugate 13-valent ([id number]) 02/12/2018    Pneumococcal Polysaccharide ([id number]) 04/06/2015    Tdap (Boostrix, Adacel) 09/01/2011           Social History:     Social History     Socioeconomic History    Marital status: Single   Tobacco Use    Smoking status: Former     Packs/day: 0.25     Years: 38.00 Pack years: 9.50     Types: Cigarettes     Start date:      Quit date: 1/3/2023     Years since quittin.2    Smokeless tobacco: Never    Tobacco comments:     cutting down as high as 3 pk/d to as low . 25    Vaping Use    Vaping Use: Never used   Substance and Sexual Activity    Alcohol use: Yes     Comment: rarely    Drug use: Never     Social Determinants of Health     Financial Resource Strain: Low Risk     Difficulty of Paying Living Expenses: Not hard at all   Food Insecurity: No Food Insecurity    Worried About [facility] in the Last Year: Never true    [facility] in the Last Year: Never true   Transportation Needs: No Transportation Needs    Lack of Transportation (Medical): No    Lack of Transportation (Non-Medical): No   Physical Activity: Inactive    Days of Exercise per Week: 7 days    Minutes of Exercise per Session: 0 min   Housing Stability: Unknown    Unstable Housing in the Last Year: No     Social History     Tobacco Use   Smoking Status Former    Packs/day: 0.25    Years: 38.00    Pack years: 9.50    Types: Cigarettes    Start date:     Quit date: 1/3/2023    Years since quittin.2   Smokeless Tobacco Never   Tobacco Comments    cutting down as high as 3 pk/d to as low . 22       Family History   Problem Relation Age of Onset    Cancer Mother         uterine cancer     Other Father         MVA    Cancer Sister         breast    Cancer Brother         brain     Cancer Maternal Aunt         breast    No Known Problems Maternal Grandmother     No Known Problems Maternal Grandfather     No Known Problems Paternal Grandmother     No Known Problems Paternal Grandfather         REVIEW OF SYSTEMS:      CONSTITUTIONAL:  negative for fevers, chills, diaphoresis, activity change, appetite change, fatigue, night sweats and unexpected weight change.    EYES:  negative for blurred vision, eye discharge, visual disturbance and icterus  HEENT:  negative for hearing loss, tinnitus, ear drainage, sinus pressure, nasal congestion, epistaxis and snoring  RESPIRATORY:  No cough , no sob, no sputum production , no wheeze ,no hemoptysis   CARDIOVASCULAR:  negative for chest pain, palpitations, exertional chest pressure/discomfort, edema, syncope  GASTROINTESTINAL:  negative for nausea, vomiting, diarrhea, constipation, blood in stool and abdominal pain  GENITOURINARY:  negative for frequency, dysuria, urinary incontinence, decreased urine volume, and hematuria  HEMATOLOGIC/LYMPHATIC:  negative for easy bruising, bleeding and lymphadenopathy  ALLERGIC/IMMUNOLOGIC:  negative for recurrent infections, angioedema, anaphylaxis and drug reactions  ENDOCRINE:  negative for weight changes and diabetic symptoms including polyuria, polydipsia and polyphagia  MUSCULOSKELETAL:  Back pain,++ joint swelling, decreased range of motion and muscle weakness  INTEGUMENTARY: negative for skin color change, rashes, blisters  NEUROLOGICAL:  negative for headaches, slurred speech, unilateral weakness  PSYCHIATRIC/BEHAVIORAL: negative for hallucinations, behavioral problems, confusion and agitation.      PHYSICAL EXAM:    Vitals:    Vitals:    03/08/23 0939   BP: 120/69   Pulse: 65   Temp: 96.9 °F (36.1 °C)   SpO2: 98%     General Appearance: alert,in no acute distress, + pallor, no icterus  poor dentition+   Skin: warm and dry, no rash or erythema  Head: normocephalic and atraumatic  Eyes: pupils equal, round, and reactive to light, extraocular eye movements intact, conjunctivae normal  ENT: tympanic membrane, external ear and ear canal normal bilaterally, nose without deformity, nasal mucosa and turbinates normal without polyps  Neck: supple and non-tender without mass, no thyromegaly  no cervical lymphadenopathy  Pulmonary/Chest: clear to auscultation bilaterally- no wheezes, rales or rhonchi, normal air movement,  Cardiovascular: normal rate, regular rhythm, normal S1 and S2, no murmurs, rubs, clicks, or gallops,   Abdomen: soft, non-tender, non-distended, normal bowel sounds, no masses or organomegaly  CRISTOFER drain ++   Extremities: no cyanosis, clubbing or edema  Musculoskeletal: normal range of motion, no joint swelling   Integumentary: no petechiae, no purpura, no blisters  Neurologic: reflexes normal and symmetric, no cranial nerve deficit, speech normal   Psych:  Orientation, sensorium, mood normal  Picc       DATA:    CBC:   Lab Results   Component Value Date    WBC 7.8 03/07/2023    HGB 10.3 (L) 03/07/2023    HCT 31.3 (L) 03/07/2023    MCV 78.2 (L) 03/07/2023     03/07/2023     RENAL:   Lab Results   Component Value Date    CREATININE 1.4 (H) 03/07/2023    BUN 24 (H) 03/07/2023     03/07/2023    K 4.5 03/07/2023     03/07/2023    CO2 24 03/07/2023     SED RATE:   Lab Results   Component Value Date/Time    SEDRATE 94 03/07/2023 01:16 PM     CK:   Lab Results   Component Value Date/Time    CKTOTAL 57 03/07/2023 01:16 PM     CRP:   Lab Results   Component Value Date/Time    CRP 23.0 03/07/2023 01:16 PM     Hepatic Function Panel:   Lab Results   Component Value Date/Time    ALKPHOS 108 03/07/2023 01:16 PM    ALT 40 03/07/2023 01:16 PM    AST 45 03/07/2023 01:16 PM    PROT 6.3 03/07/2023 01:16 PM    BILITOT <0.2 03/07/2023 01:16 PM    LABALBU 3.3 03/07/2023 01:16 PM     UA:  Lab Results   Component Value Date/Time    COLORU Yellow 02/07/2023 06:53 PM    COLORU yellow 02/07/2023 02:09 PM    CLARITYU Clear 02/07/2023 06:53 PM    CLARITYU cloudy 02/07/2023 02:09 PM    GLUCOSEU Negative 02/07/2023 06:53 PM    GLUCOSEU negative 02/07/2023 02:09 PM    BILIRUBINUR Negative 02/07/2023 06:53 PM    BILIRUBINUR negative 02/07/2023 02:09 PM    KETUA Negative 02/07/2023 06:53 PM    KETUA negative 02/07/2023 02:09 PM    SPECGRAV 1.088 02/07/2023 06:53 PM    SPECGRAV <=1.005 02/07/2023 02:09 PM    BLOODU LARGE 02/07/2023 06:53 PM    BLOODU large 02/07/2023 02:09 PM    PHUR 5.0 02/07/2023 06:53 PM    PHUR 6.0 02/07/2023 02:09 PM PROTEINU 30 02/07/2023 06:53 PM    PROTEINU 30 mg /dl 02/07/2023 02:09 PM    UROBILINOGEN 1.0 02/07/2023 06:53 PM    NITRU Negative 02/07/2023 06:53 PM    LEUKOCYTESUR Negative 02/07/2023 06:53 PM    LEUKOCYTESUR negative 02/07/2023 02:09 PM    LABMICR YES 02/07/2023 06:53 PM    URINETYPE NotGiven 02/07/2023 06:53 PM      Urine Microscopic:   Lab Results   Component Value Date/Time    BACTERIA None Seen 02/07/2023 06:53 PM    HYALCAST 0 02/07/2023 06:53 PM    WBCUA 1 02/07/2023 06:53 PM    RBCUA 8 02/07/2023 06:53 PM    EPIU 1 02/07/2023 06:53 PM     Urine Reflex to Culture:   Lab Results   Component Value Date/Time    URRFLXCULT Not Indicated 02/07/2023 06:53 PM       MICRO: cultures reviewed and updated by me     Respiratory Culture:  Lab Results   Component Value Date/Time    LABGRAM 4+ Gram negative rods  No WBC's seen   03/13/2023 03:59 PM     AFB:  Lab Results   Component Value Date/Time    AFBSMEAR No AFB observed by Fluorescent stain 02/10/2023 11:45 AM     Component 2/10/23 1145   Gram Stain Result 4+ WBC's (Polymorphonuclear)   No organisms seen    WOUND/ABSCESS No growth 60 to 72 hours    Anaerobic Culture No anaerobes isolated    Resulting Agency [address] [facility] [address] [facility]           Narrative  Performed by: [facility]  ORDER#: [id number]                          ORDERED BY: Yenni Body   SOURCE: Abscess Psoas fluid                COLLECTED:  02/10/23 11:45   ANTIBIOTICS AT KELSI.:                      RECEIVED :  02/10/23 12:41      Specimen Collected: 02/10/23 11:45 EST Last Resulted: 02/15/23 10:31 EST             Urine Culture: No results for input(s): Ozzie Aguirre in the last 72 hours. Imaging:  No orders to display        Retroperitoneum: A left lateral approach locking pigtail percutaneous   drainage catheter is present with the pigtail terminating along the anterior   aspect of the left iliopsoas muscle belly, adjacent to the abdominal aorta   aneurysm sac.   No evidence of residual fluid collection. Persistent but   overall decreased amount of inflammatory stranding surrounding the left   retroperitoneal space associated with the former fluid collection. Lymph nodes: Few scattered lymph nodes are present adjacent to the aneurysm   sac, likely reactive in nature. Vasculature: Aorta bi-iliac stent graft is patent with mild compression of   the left iliac graft component, similar to prior examination. No evidence of   endoleak formation. Inflammatory stranding surrounding the aneurysm sac   remains. The aneurysm sac measures approximately 5 cm in diameter (series 4,   image 92) previously 5.5 cm when measuring in a similar fashion. The iliac   and common femoral arteries are patent. Soft Tissues/Bones: No acute abnormalities identified in the abdominal wall   soft tissues. Stable appearance of multilevel degenerative disc disease,   most significant at L4-L5 and L5-S1. Impression   No evidence of residual left iliopsoas fluid status post percutaneous   drainage catheter placement. The catheter pigtail abuts the left   posterolateral aspect of the aneurysm sac. Patent aorto bi-iliac stent graft traversing an abdominal aortic aneurysm   with no evidence of endoleak. Persistent inflammatory changes surrounding   the aneurysm sac.            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 Inflammatory abdominal aortic aneurysm  I71.40 Sedimentation Rate     C-Reactive Protein     CBC with Auto Differential     Comprehensive Metabolic Panel      2. CRP elevated  R79.82       3. Elevated erythrocyte sedimentation rate  R70.0       4. Psoas abscess, left ([address]  K68.12       5. History of abdominal aortic aneurysm (AAA) repair  Z98.890       6. Ruptured infrarenal abdominal aortic aneurysm (AAA)  I71.33       7. Status post endovascular aneurysm repair (EVAR)  Z98.890     Z86.79       8.  Receiving intravenous antibiotic treatment as outpatient  Z79.2         Left Psoas abscess and fluid collection now resolved on the CTA, he has a h/o AAA endovascular repair for ruptured AAA - ESR, CRP now trending down and cx have been negative and has very poor dentition concern was for seeding and inflammation of the area - CRISTOFER drain in place and out is minimal and vascular surgery plans to remove the drain - he will finish IV abx as planned and will choose oral Augmentin for suppression until follow up imaging is reassuring-      PLAN:    Finish home IV abx as planned   D/C PICC line after completion of Home IV abx  Labs reviewed  ESR, CRP trend down   CRISTOFER drain management per surgery   Start oral augmentin x  875 mg twice  a day   Follow up in 6 weeks  Side effects d/w pt    Ok for dental extractions to prevent further infections   Risk for flare up d/w pt              Total time spent for this encounter: 45 min  on visit (including interval history,physical exam, review of data including labs, cultures, imaging,  ordering labs, development and implementation of treatment plan, co ordination of care, counseling and education ).     --Bridgett Lucero MD on 3/18/2023 at 12:40 PM    An electronic signature was used to authenticate this note. D/w Patient in detail at  the time of consultation. Thanks for allowing me to participate in your patient's care and please do not hesitate to  call me with any questions or concerns.     Candy Rodríguez MD  Infectious Disease  [facility] ([facility]) Physician  Phone: [phone]   Fax : [phone]

## 2023-03-09 ENCOUNTER — TELEPHONE (OUTPATIENT)
Dept: INFECTIOUS DISEASES | Age: 73
End: 2023-03-09

## 2023-03-09 NOTE — TELEPHONE ENCOUNTER
Spoke with Janna at [address] [place] [address] verbalized understanding to DC IV ABX and pull PICC.

## 2023-03-10 ENCOUNTER — CARE COORDINATION (OUTPATIENT)
Dept: CASE MANAGEMENT | Age: 73
End: 2023-03-10

## 2023-03-10 NOTE — CARE COORDINATION
[address] Care Transitions Follow Up Call    Patient Current Location:  Home: [address] [place] [address]  [place] [phone]    Left HIPPA compliant message regarding the nature of the call and a request for a return call with my contact information      ALICJA Garcia, [phone]  [facility] Transition Nurse         Patient: Tere Bernal Sr  Patient : 1950   MRN: [id number]  Reason for Admission: infected aneurysm repair r/t tooth caries  Discharge Date: 3/4/23 RARS: Readmission Risk Score: 11      Follow Up  Future Appointments   Date Time Provider Juanito Miner   3/13/2023  3:15 PM DO JACKSON Henriquez VASC/EN [facility]   2023 10:30 AM Sarahy Parra MD [place] [facility] DIS [facility]   2023  8:45 AM MD Gerhard Goldberg BSN, RN   [address] [place] [address] Transition Nurse  [phone]

## 2023-03-13 ENCOUNTER — OFFICE VISIT (OUTPATIENT)
Dept: VASCULAR SURGERY | Age: 73
End: 2023-03-13

## 2023-03-13 VITALS — WEIGHT: 189 LBS | BODY MASS INDEX: 24.27 KG/M2

## 2023-03-13 DIAGNOSIS — K68.12 PSOAS ABSCESS, LEFT (HCC): Primary | ICD-10-CM

## 2023-03-13 DIAGNOSIS — K68.19 RETROPERITONEAL ABSCESS (HCC): Primary | ICD-10-CM

## 2023-03-13 PROCEDURE — 99024 POSTOP FOLLOW-UP VISIT: CPT | Performed by: STUDENT IN AN ORGANIZED HEALTH CARE EDUCATION/TRAINING PROGRAM

## 2023-03-14 ENCOUNTER — CARE COORDINATION (OUTPATIENT)
Dept: CASE MANAGEMENT | Age: 73
End: 2023-03-14

## 2023-03-14 NOTE — CARE COORDINATION
[facility] Care Transitions Follow Up Call    Patient Current Location: [address]  Transition Nurse contacted the patient by telephone to follow up after recent hospital admission. Patient: Pham Awad Sr  Patient : 1950   MRN: <[id number]>  Reason for Admission: possible sepsis, left psoas fluid collection 2/2 abscess s/p drain, aortic aneurysm s/p repair, anemia-mild, generalized weakness, hematuria, essential hypertension-uncontrolled, PAD -> home with drain, f/up urology, PICC IV abx end 3/7, f/w Dr Jamshid Guadalupe 3 weeks  Discharge Date: 3/4/23 RARS: Readmission Risk Score: 11      Needs to be reviewed by the provider   Additional needs identified to be addressed with provider: No         Method of communication with provider: none. Still with drain - continues with weekly f/up with vascular until no drainage. He is feeling well. His appetite is improved as his sense of taste is better now that IV abx complete. Denies n/v/d. Taking PO abx now. Takes Miralax daily. He denies needs at this time. Will recommend f/up by ACM. Addressed changes since last contact:   OV with Dr Heidi Navarro  Discussed follow-up appointments. If no appointment was previously scheduled, appointment scheduling offered: No.   Is follow up appointment scheduled within 7 days of discharge? completed. Follow Up  Future Appointments   Date Time Provider Juanito Miner   3/23/2023 11:45 AM Celestino Smith DO [facility]   2023 10:30 AM Liv George MD [place] [facility] DIS [facility]   2023  8:45 AM Albania Juares MD [address] [facility]     Non-[facility] follow up appointment(s): OLE    Care Transition Nurse reviewed medical action plan and red flags with patient and discussed any barriers to care and/or understanding of plan of care after discharge.  Discussed appropriate site of care based on symptoms and resources available to patient including: PCP  Specialist. The patient agrees to contact the PCP office for questions related to their healthcare. Patients top risk factors for readmission: medical condition-see above  Interventions to address risk factors: Education of patient/family/caregiver/guardian to support self-management-f/up as scheduled; report new/concerning symptoms same day    Care Transition Nurse provided contact information for future needs. No further follow-up call indicated based on severity of symptoms and risk factors.   Plan for next call: [facility] outreach - care transition complete    Ye Marrero RN  Care Transition Nurse  [phone] mobile

## 2023-03-14 NOTE — PROGRESS NOTES
Oswald Paniagua Sr (:  1950) is a 81 y.o. male,Established patient, here for evaluation of the following chief complaint(s):  Post-Op Check (Drain check.)         ASSESSMENT/PLAN:  1. Retroperitoneal abscess [facility])    This is a 81year old male patient who presents for follow up to discuss possible drain removal. Unfortunately the drain continues to drain fluid. He is ambivalent about removal. No further pain. The CTA recently does show inflammation around the aneurysm. Would recommend repeat culture of the fluid; if sterile then can consider removal next week. However discussed with patient that would recommend weekly follow up until essentially no drainage accumulation prior to removal. Will perform culture and follow up next week. All questions answered. Subjective   SUBJECTIVE/OBJECTIVE:  This is a 81year old male patient who presents for follow up after drain placement to treat a RP abscess presumably as patient has infected aorta treated with EVAR. Overall he feels fine. No fever or chills. Drain still drains around 20 cc per day. Recent CTA shows resolution but still has inflammation. Objective   Physical Exam  Constitutional:       Appearance: Normal appearance. Cardiovascular:      Rate and Rhythm: Normal rate and regular rhythm. Abdominal:      General: There is no distension. Palpations: Abdomen is soft. Comments: Drain with turbid fluid   Skin:     General: Skin is warm and dry. Neurological:      Mental Status: He is alert.           DO Jude, FACS, FSVS, [facility] Vascular and Endovascular Surgery

## 2023-03-18 LAB
BACTERIA SPEC AEROBE CULT: ABNORMAL
BACTERIA SPEC ANAEROBE CULT: ABNORMAL
GRAM STN SPEC: ABNORMAL
ORGANISM: ABNORMAL

## 2023-03-20 ENCOUNTER — CARE COORDINATION (OUTPATIENT)
Dept: CARE COORDINATION | Age: 73
End: 2023-03-20

## 2023-03-20 NOTE — CARE COORDINATION
cc outreach 1 from ctn referral. Attempted to reach patient by phone. No answer. Left brief message with name, contact number and asked for return call back. Waiting for patient response at this time. Will update notes when callback is received. Will follow up at another date and time.

## 2023-03-21 ENCOUNTER — TELEPHONE (OUTPATIENT)
Dept: SURGERY | Age: 73
End: 2023-03-21

## 2023-03-21 DIAGNOSIS — K68.12 PSOAS ABSCESS, LEFT (HCC): Primary | ICD-10-CM

## 2023-03-21 LAB
ACID FAST STN SPEC QL: NORMAL
MYCOBACTERIUM SPEC CULT: NORMAL

## 2023-03-22 ENCOUNTER — HOSPITAL ENCOUNTER (OUTPATIENT)
Dept: INTERVENTIONAL RADIOLOGY/VASCULAR | Age: 73
Discharge: HOME | End: 2023-03-22
Payer: MEDICARE

## 2023-03-22 ENCOUNTER — HOSPITAL ENCOUNTER (OUTPATIENT)
Dept: CT IMAGING | Age: 73
Discharge: HOME | End: 2023-03-22
Payer: MEDICARE

## 2023-03-22 ENCOUNTER — CARE COORDINATION (OUTPATIENT)
Dept: CARE COORDINATION | Age: 73
End: 2023-03-22

## 2023-03-22 VITALS
HEIGHT: 74 IN | HEART RATE: 57 BPM | WEIGHT: 201 LBS | BODY MASS INDEX: 25.8 KG/M2 | TEMPERATURE: 97 F | RESPIRATION RATE: 16 BRPM | OXYGEN SATURATION: 99 % | SYSTOLIC BLOOD PRESSURE: 127 MMHG | DIASTOLIC BLOOD PRESSURE: 68 MMHG

## 2023-03-22 DIAGNOSIS — K68.12 PSOAS ABSCESS, LEFT (HCC): ICD-10-CM

## 2023-03-22 PROCEDURE — 49424 ASSESS CYST CONTRAST INJECT: CPT

## 2023-03-22 PROCEDURE — 87205 SMEAR GRAM STAIN: CPT

## 2023-03-22 PROCEDURE — 49423 EXCHANGE DRAINAGE CATHETER: CPT

## 2023-03-22 PROCEDURE — 87186 SC STD MICRODIL/AGAR DIL: CPT

## 2023-03-22 PROCEDURE — 87075 CULTR BACTERIA EXCEPT BLOOD: CPT

## 2023-03-22 PROCEDURE — C1729 CATH, DRAINAGE: HCPCS

## 2023-03-22 PROCEDURE — 87070 CULTURE OTHR SPECIMN AEROBIC: CPT

## 2023-03-22 PROCEDURE — 76080 X-RAY EXAM OF FISTULA: CPT

## 2023-03-22 PROCEDURE — 75984 XRAY CONTROL CATHETER CHANGE: CPT

## 2023-03-22 PROCEDURE — 87077 CULTURE AEROBIC IDENTIFY: CPT

## 2023-03-22 PROCEDURE — 6360000004 HC RX CONTRAST MEDICATION: Performed by: STUDENT IN AN ORGANIZED HEALTH CARE EDUCATION/TRAINING PROGRAM

## 2023-03-22 PROCEDURE — 7100000010 HC PHASE II RECOVERY - FIRST 15 MIN

## 2023-03-22 PROCEDURE — 87181 SC STD AGAR DILUTION PER AGT: CPT

## 2023-03-22 PROCEDURE — 6360000002 HC RX W HCPCS: Performed by: STUDENT IN AN ORGANIZED HEALTH CARE EDUCATION/TRAINING PROGRAM

## 2023-03-22 PROCEDURE — 7100000011 HC PHASE II RECOVERY - ADDTL 15 MIN

## 2023-03-22 PROCEDURE — 99152 MOD SED SAME PHYS/QHP 5/>YRS: CPT

## 2023-03-22 RX ORDER — FENTANYL CITRATE 50 UG/ML
INJECTION, SOLUTION INTRAMUSCULAR; INTRAVENOUS DAILY PRN
Status: COMPLETED | OUTPATIENT
Start: 2023-03-22 | End: 2023-03-22

## 2023-03-22 RX ORDER — MIDAZOLAM HYDROCHLORIDE 1 MG/ML
INJECTION INTRAMUSCULAR; INTRAVENOUS DAILY PRN
Status: COMPLETED | OUTPATIENT
Start: 2023-03-22 | End: 2023-03-22

## 2023-03-22 RX ADMIN — FENTANYL CITRATE 25 MCG: 50 INJECTION INTRAMUSCULAR; INTRAVENOUS at 11:23

## 2023-03-22 RX ADMIN — MIDAZOLAM 0.5 MG: 1 INJECTION INTRAMUSCULAR; INTRAVENOUS at 11:18

## 2023-03-22 RX ADMIN — MIDAZOLAM 0.5 MG: 1 INJECTION INTRAMUSCULAR; INTRAVENOUS at 11:23

## 2023-03-22 RX ADMIN — IOPAMIDOL 10 ML: 510 INJECTION, SOLUTION INTRAVASCULAR at 11:45

## 2023-03-22 RX ADMIN — FENTANYL CITRATE 25 MCG: 50 INJECTION INTRAMUSCULAR; INTRAVENOUS at 11:18

## 2023-03-22 ASSESSMENT — PAIN - FUNCTIONAL ASSESSMENT: PAIN_FUNCTIONAL_ASSESSMENT: 0-10

## 2023-03-22 NOTE — DISCHARGE INSTRUCTIONS
Joel Broussard  [address]  Telephone: [phone]      [facility]:  [id number]  TODAY'S DATE: @ED@      Discharge Instructions - Post Drain Placement. How can you care for yourself at home? Wash your hands before you handle the tube. Clean the area around the tube with soap and water every day. If you have been told you can reuse your bag, you can clean the bag after removing it from the tube. To clean the bag, fill it with 2 parts vinegar to 3 parts water, and let it stand for 20 minutes. Then empty it out, and let it air dry. Empty the drainage bag before it is completely full or every 2 to 3 hours. Do not swim or take baths while you have a abscess drain. You can shower after wrapping the end of the drain with plastic wrap. If the device that holds the tube comes loose, please call #[phone] and ask for the radiology nurse. No lifting over 10 lbs for the next 3-4 days      When should you call for help? Call your doctor now or seek immediate medical care if:  Your drain becomes blocked. Your tube leaks. You have pain in your back just below your rib cage. This is called flank pain. You have a fever, chills, or body aches. You have groin or belly pain. You have pain or bleeding around the tube. You have swelling around the catheter. Watch closely for changes in your health, and be sure to contact your doctor if:  You need more help to learn how to care for your tube.

## 2023-03-22 NOTE — PROGRESS NOTES
From IR. Alert and oriented. No c/o. Vss. dressing and CRISTOFER drain are clean dry intact. No drainage in CRISTOFER bulb.

## 2023-03-22 NOTE — BRIEF OP NOTE
Brief Postoperative Note    Agustin Hernandez Sr  YOB: 1950  [id number]      Pre-operative Diagnosis: RP abscess    Post-operative Diagnosis: Same    Procedure: Drainage catheter exchange, abscessogram    Anesthesia: Local and Moderate Sedation    Surgeons/Assistants: Orville Mary DO    Estimated Blood Loss: less than 50     Complications: None    Specimens: Was Obtained: 2 mL red fluid    Findings:   Minimal abscess cavity with communication of contrast to the aorta aneurysm. Successful exchange/downsize to 8-F locking pigtail catheter.        Orville Mary DO  3/22/2023, 11:32 AM  Interventional Radiology  [phone])

## 2023-03-22 NOTE — PROGRESS NOTES
Vss. Tolerated sitting up and po fluids and crackers well. Family at bedside. Verbalized understanding of discharge instructions. Patient states he knows how to take care of CRISTOFER drain. Dressing remains clean dry intact.

## 2023-03-22 NOTE — CARE COORDINATION
CC CTN REFERRAL OUTREACH 2. Spoke to patient and introduced self and care coordination. Patient states he did not want to enroll in care coordination outreaches at this time. Provided my name and contact number. Patient thanked this RN ACM for the offer and states he will notify pcp if he decides in future he would like to participate. Will remain available if patient decides to get assistance.

## 2023-03-22 NOTE — PRE SEDATION
Sedation Pre-Procedure Note    Patient Name: Saeid Hernandez Sr   YOB: 1950  Room/Bed: Room/bed info not found  Medical Record Number: [id number]  Date: 3/22/2023   Time: 11:13 AM       Indication:  Left RP collection s/p drain placement here for drain exchange. Patient reports minimal daily output. Cultures are requested as well    Consent: I have discussed with the patient and/or the patient representative the indication, alternatives, and the possible risks and/or complications of the planned procedure and the anesthesia methods. The patient and/or patient representative appear to understand and agree to proceed. Vital Signs: There were no vitals filed for this visit. Past Medical History:   has a past medical history of Hyperlipidemia, Hypertension, and PAD (peripheral artery disease) ([facility] [address] Past Surgical History:   has a past surgical history that includes Toe Surgery (Left); shoulder surgery (Right); Cholecystectomy; AAA repair, endovascular (N/A, 1/4/2023); Femoral Endarterectomy (Right, 1/4/2023); and CT DRAINAGE VISCERAL PERCUTANEOUS (2/8/2023). Medications:   Scheduled Meds:   Continuous Infusions:   PRN Meds:   Home Meds:   Prior to Admission medications    Medication Sig Start Date End Date Taking?  Authorizing Provider   amoxicillin-clavulanate (AUGMENTIN) 875-125 MG per tablet Take 1 tablet by mouth 2 times daily 3/8/23 4/7/23  Indira Waldrop MD   lisinopril (PRINIVIL;ZESTRIL) 20 MG tablet Take 2 tablets by mouth daily 2/22/23   Nai Miranda MD   omeprazole (PRILOSEC) 20 MG delayed release capsule Take 1 capsule by mouth every morning (before breakfast) 2/22/23   Nai Miranda MD   metoprolol succinate (TOPROL XL) 25 MG extended release tablet Take 1 tablet by mouth daily 1/31/23   LELE Flores NP   amLODIPine (NORVASC) 5 MG tablet Take 1 tablet by mouth daily 1/22/23   Nai Miranda MD   tiZANidine (ZANAFLEX) 2 MG tablet TAKE 1 TABLET BY MOUTH THREE

## 2023-03-25 ENCOUNTER — TELEPHONE (OUTPATIENT)
Dept: INFECTIOUS DISEASES | Age: 73
End: 2023-03-25

## 2023-03-25 RX ORDER — LEVOFLOXACIN 500 MG/1
500 TABLET, FILM COATED ORAL DAILY
Qty: 30 TABLET | Refills: 1 | Status: SHIPPED | OUTPATIENT
Start: 2023-03-25 | End: 2023-04-24

## 2023-03-25 NOTE — TELEPHONE ENCOUNTER
Cx reviewed again with Stenotrophomonas will need abx started on Levofloxacin x 500 mg daily x 30 days meds sent to his pharmacy message left for the Pt on his VM  -  He will cont Augmentin as before  - thx      Component 3/22/23 1128   Gram Stain Result 1+ WBC's (Polymorphonuclear)   No organisms seen    Anaerobic Culture     Anaerobic culture further report to follow   No anaerobes isolated so far, Further report to follow  P      Organism Stenotrophomonas maltophilia Abnormal     WOUND/ABSCESS Moderate growth    Resulting Agency [facility]        Susceptibility    Stenotrophomonas maltophilia (1)    Antibiotic Interpretation Microscan  Method Status    trimethoprim-sulfamethoxazole Sensitive <=2/38 mcg/mL BACTERIAL SUSCEPTIBILITY PANEL BY ROCIO     levofloxacin Sensitive 0.25 ug/ml BACTERIAL SUSCEPTIBILITY PANEL BY E-TEST         Narrative  Performed by: [address] [facility]  ORDER#: [id number]                          ORDERED BY: Lenard Nash   SOURCE: Abscess                            COLLECTED:  03/22/23 11:28

## 2023-03-28 LAB
ACID FAST STN SPEC QL: NORMAL
MYCOBACTERIUM SPEC CULT: NORMAL

## 2023-03-29 ENCOUNTER — TELEPHONE (OUTPATIENT)
Dept: INFECTIOUS DISEASES | Age: 73
End: 2023-03-29

## 2023-03-29 NOTE — TELEPHONE ENCOUNTER
----- Message from Velda Litten, MD sent at 3/28/2023 10:48 AM EDT -----  Can you make sure he is taking Levofloxacin and Augmentin as planned

## 2023-04-03 ENCOUNTER — TELEPHONE (OUTPATIENT)
Dept: FAMILY MEDICINE CLINIC | Age: 73
End: 2023-04-03

## 2023-04-05 DIAGNOSIS — I10 ESSENTIAL HYPERTENSION: ICD-10-CM

## 2023-04-05 RX ORDER — LISINOPRIL 20 MG/1
40 TABLET ORAL DAILY
Qty: 180 TABLET | Refills: 1 | Status: SHIPPED | OUTPATIENT
Start: 2023-04-05

## 2023-04-06 ENCOUNTER — OFFICE VISIT (OUTPATIENT)
Dept: VASCULAR SURGERY | Age: 73
End: 2023-04-06
Payer: MEDICARE

## 2023-04-06 VITALS
DIASTOLIC BLOOD PRESSURE: 84 MMHG | WEIGHT: 193.3 LBS | BODY MASS INDEX: 24.81 KG/M2 | HEIGHT: 74 IN | SYSTOLIC BLOOD PRESSURE: 126 MMHG

## 2023-04-06 DIAGNOSIS — T82.7XXD INFECTION OF AORTIC GRAFT, SUBSEQUENT ENCOUNTER: Primary | ICD-10-CM

## 2023-04-06 PROCEDURE — 1123F ACP DISCUSS/DSCN MKR DOCD: CPT | Performed by: STUDENT IN AN ORGANIZED HEALTH CARE EDUCATION/TRAINING PROGRAM

## 2023-04-06 PROCEDURE — 3074F SYST BP LT 130 MM HG: CPT | Performed by: STUDENT IN AN ORGANIZED HEALTH CARE EDUCATION/TRAINING PROGRAM

## 2023-04-06 PROCEDURE — 3079F DIAST BP 80-89 MM HG: CPT | Performed by: STUDENT IN AN ORGANIZED HEALTH CARE EDUCATION/TRAINING PROGRAM

## 2023-04-06 PROCEDURE — 99213 OFFICE O/P EST LOW 20 MIN: CPT | Performed by: STUDENT IN AN ORGANIZED HEALTH CARE EDUCATION/TRAINING PROGRAM

## 2023-04-06 PROCEDURE — G8428 CUR MEDS NOT DOCUMENT: HCPCS | Performed by: STUDENT IN AN ORGANIZED HEALTH CARE EDUCATION/TRAINING PROGRAM

## 2023-04-06 PROCEDURE — G8420 CALC BMI NORM PARAMETERS: HCPCS | Performed by: STUDENT IN AN ORGANIZED HEALTH CARE EDUCATION/TRAINING PROGRAM

## 2023-04-06 PROCEDURE — 1036F TOBACCO NON-USER: CPT | Performed by: STUDENT IN AN ORGANIZED HEALTH CARE EDUCATION/TRAINING PROGRAM

## 2023-04-06 PROCEDURE — 3017F COLORECTAL CA SCREEN DOC REV: CPT | Performed by: STUDENT IN AN ORGANIZED HEALTH CARE EDUCATION/TRAINING PROGRAM

## 2023-04-06 RX ORDER — NITROFURANTOIN 25; 75 MG/1; MG/1
CAPSULE ORAL
COMMUNITY
Start: 2023-01-20

## 2023-04-06 RX ORDER — IBUPROFEN 800 MG/1
800 TABLET ORAL EVERY 8 HOURS PRN
COMMUNITY
Start: 2023-03-30

## 2023-04-06 RX ORDER — AMOXICILLIN 500 MG/1
CAPSULE ORAL
COMMUNITY
Start: 2023-03-30

## 2023-04-06 RX ORDER — VANCOMYCIN HYDROCHLORIDE 10 G/100ML
INJECTION, POWDER, LYOPHILIZED, FOR SOLUTION INTRAVENOUS
COMMUNITY
Start: 2023-02-28

## 2023-04-06 NOTE — PROGRESS NOTES
Aleksey Nguyễn Sr (:  1950) is a 81 y.o. male,Established patient, here for evaluation of the following chief complaint(s):  Post-Op Check (Drain check)         ASSESSMENT/PLAN:  1. Infection of aortic graft, subsequent encounter    This is a 81year old male patient s/p EVAR and with evidence to support infected aneurysm. Will continue with drain placement. Check again in 1 month. Will repeat CTA at that time as well. Will need to evaluate daily drain output. If starts to lessen then will consider downsizing. Discussed with IR and there is some consideration of using betadine to sclerose the area that is draining. Continue close observation. All questions answered. 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 Had drain downsized with injection of contrast to show communication of psoas fluid collection with aortic sac. Cultures demonstrates stenotrophomonas which is possibly a tube contaminant, spoke to Dr. Justin Isbell and will plan to cover that organism as well. Planning teeth extraction. Repeat CTA had shown persistent aortic inflammation. No further abdominal or back pain or groin pain. Objective   Physical Exam  Constitutional:       Appearance: Normal appearance. Cardiovascular:      Rate and Rhythm: Normal rate and regular rhythm. Abdominal:      General: There is no distension. Palpations: Abdomen is soft. Comments: Drain with turbid fluid   Skin:     General: Skin is warm and dry. Neurological:      Mental Status: He is alert.      Baljinder Ward DO, FACS, FSVS, [facility]

## 2023-04-19 ENCOUNTER — OFFICE VISIT (OUTPATIENT)
Dept: INFECTIOUS DISEASES | Age: 73
End: 2023-04-19
Payer: MEDICARE

## 2023-04-19 VITALS
OXYGEN SATURATION: 95 % | HEIGHT: 74 IN | BODY MASS INDEX: 25.1 KG/M2 | WEIGHT: 195.6 LBS | HEART RATE: 67 BPM | DIASTOLIC BLOOD PRESSURE: 76 MMHG | TEMPERATURE: 97.3 F | SYSTOLIC BLOOD PRESSURE: 124 MMHG

## 2023-04-19 DIAGNOSIS — Z98.890 HISTORY OF ABDOMINAL AORTIC ANEURYSM (AAA) REPAIR: ICD-10-CM

## 2023-04-19 DIAGNOSIS — Z98.890 STATUS POST ENDOVASCULAR ANEURYSM REPAIR (EVAR): ICD-10-CM

## 2023-04-19 DIAGNOSIS — R70.0 ELEVATED ERYTHROCYTE SEDIMENTATION RATE: ICD-10-CM

## 2023-04-19 DIAGNOSIS — I71.33 RUPTURED INFRARENAL ABDOMINAL AORTIC ANEURYSM (AAA) (HCC): ICD-10-CM

## 2023-04-19 DIAGNOSIS — Z86.79 STATUS POST ENDOVASCULAR ANEURYSM REPAIR (EVAR): ICD-10-CM

## 2023-04-19 DIAGNOSIS — K68.12 PSOAS ABSCESS, LEFT (HCC): Primary | ICD-10-CM

## 2023-04-19 DIAGNOSIS — I71.40 INFLAMMATORY ABDOMINAL AORTIC ANEURYSM (HCC): ICD-10-CM

## 2023-04-19 DIAGNOSIS — A49.8 INFECTION DUE TO STENOTROPHOMONAS MALTOPHILIA: ICD-10-CM

## 2023-04-19 DIAGNOSIS — I73.9 PVD (PERIPHERAL VASCULAR DISEASE) (HCC): ICD-10-CM

## 2023-04-19 PROCEDURE — 1036F TOBACCO NON-USER: CPT | Performed by: INTERNAL MEDICINE

## 2023-04-19 PROCEDURE — 1123F ACP DISCUSS/DSCN MKR DOCD: CPT | Performed by: INTERNAL MEDICINE

## 2023-04-19 PROCEDURE — G8417 CALC BMI ABV UP PARAM F/U: HCPCS | Performed by: INTERNAL MEDICINE

## 2023-04-19 PROCEDURE — 3074F SYST BP LT 130 MM HG: CPT | Performed by: INTERNAL MEDICINE

## 2023-04-19 PROCEDURE — 3078F DIAST BP <80 MM HG: CPT | Performed by: INTERNAL MEDICINE

## 2023-04-19 PROCEDURE — 3017F COLORECTAL CA SCREEN DOC REV: CPT | Performed by: INTERNAL MEDICINE

## 2023-04-19 PROCEDURE — G8427 DOCREV CUR MEDS BY ELIG CLIN: HCPCS | Performed by: INTERNAL MEDICINE

## 2023-04-19 PROCEDURE — 99215 OFFICE O/P EST HI 40 MIN: CPT | Performed by: INTERNAL MEDICINE

## 2023-04-19 RX ORDER — LEVOFLOXACIN 500 MG/1
500 TABLET, FILM COATED ORAL DAILY
Qty: 30 TABLET | Refills: 3 | Status: SHIPPED | OUTPATIENT
Start: 2023-04-19 | End: 2023-05-19

## 2023-04-19 RX ORDER — AMOXICILLIN AND CLAVULANATE POTASSIUM 875; 125 MG/1; MG/1
1 TABLET, FILM COATED ORAL 2 TIMES DAILY
Qty: 60 TABLET | Refills: 3 | Status: SHIPPED | OUTPATIENT
Start: 2023-04-19 | End: 2023-05-19

## 2023-04-20 ENCOUNTER — OFFICE VISIT (OUTPATIENT)
Dept: FAMILY MEDICINE CLINIC | Age: 73
End: 2023-04-20

## 2023-04-20 VITALS
DIASTOLIC BLOOD PRESSURE: 79 MMHG | OXYGEN SATURATION: 97 % | WEIGHT: 195 LBS | TEMPERATURE: 97.9 F | BODY MASS INDEX: 25.03 KG/M2 | HEART RATE: 51 BPM | HEIGHT: 74 IN | SYSTOLIC BLOOD PRESSURE: 124 MMHG

## 2023-04-20 DIAGNOSIS — Z00.00 MEDICARE ANNUAL WELLNESS VISIT, SUBSEQUENT: Primary | ICD-10-CM

## 2023-04-20 DIAGNOSIS — N40.1 BENIGN PROSTATIC HYPERPLASIA (BPH) WITH URINARY URGENCY: ICD-10-CM

## 2023-04-20 DIAGNOSIS — I71.33 RUPTURED INFRARENAL ABDOMINAL AORTIC ANEURYSM (AAA) (HCC): ICD-10-CM

## 2023-04-20 DIAGNOSIS — E78.00 HYPERCHOLESTEREMIA: ICD-10-CM

## 2023-04-20 DIAGNOSIS — I10 ESSENTIAL HYPERTENSION: ICD-10-CM

## 2023-04-20 DIAGNOSIS — R39.15 BENIGN PROSTATIC HYPERPLASIA (BPH) WITH URINARY URGENCY: ICD-10-CM

## 2023-04-20 DIAGNOSIS — K68.12 PSOAS ABSCESS (HCC): ICD-10-CM

## 2023-04-20 ASSESSMENT — PATIENT HEALTH QUESTIONNAIRE - PHQ9
2. FEELING DOWN, DEPRESSED OR HOPELESS: 0
SUM OF ALL RESPONSES TO PHQ QUESTIONS 1-9: 3
4. FEELING TIRED OR HAVING LITTLE ENERGY: 0
SUM OF ALL RESPONSES TO PHQ QUESTIONS 1-9: 3
6. FEELING BAD ABOUT YOURSELF - OR THAT YOU ARE A FAILURE OR HAVE LET YOURSELF OR YOUR FAMILY DOWN: 0
7. TROUBLE CONCENTRATING ON THINGS, SUCH AS READING THE NEWSPAPER OR WATCHING TELEVISION: 0
SUM OF ALL RESPONSES TO PHQ9 QUESTIONS 1 & 2: 3
1. LITTLE INTEREST OR PLEASURE IN DOING THINGS: 3
SUM OF ALL RESPONSES TO PHQ QUESTIONS 1-9: 3
9. THOUGHTS THAT YOU WOULD BE BETTER OFF DEAD, OR OF HURTING YOURSELF: 0
3. TROUBLE FALLING OR STAYING ASLEEP: 0
8. MOVING OR SPEAKING SO SLOWLY THAT OTHER PEOPLE COULD HAVE NOTICED. OR THE OPPOSITE, BEING SO FIGETY OR RESTLESS THAT YOU HAVE BEEN MOVING AROUND A LOT MORE THAN USUAL: 0
SUM OF ALL RESPONSES TO PHQ QUESTIONS 1-9: 3
10. IF YOU CHECKED OFF ANY PROBLEMS, HOW DIFFICULT HAVE THESE PROBLEMS MADE IT FOR YOU TO DO YOUR WORK, TAKE CARE OF THINGS AT HOME, OR GET ALONG WITH OTHER PEOPLE: 0
5. POOR APPETITE OR OVEREATING: 0

## 2023-04-20 ASSESSMENT — ENCOUNTER SYMPTOMS
BLOOD IN STOOL: 0
ABDOMINAL PAIN: 0
SHORTNESS OF BREATH: 0
VOMITING: 0
BACK PAIN: 0
NAUSEA: 0
COUGH: 0
DIARRHEA: 0
TROUBLE SWALLOWING: 0
ROS SKIN COMMENTS: NO CONCERNING SKIN LESIONS

## 2023-04-20 ASSESSMENT — LIFESTYLE VARIABLES: HOW OFTEN DO YOU HAVE A DRINK CONTAINING ALCOHOL: 2-4 TIMES A MONTH

## 2023-04-20 NOTE — PROGRESS NOTES
SUBJECTIVE:  Makayla Nixon is a 81 y.o. male being evaluated for:    Chief Complaint   Patient presents with    Medicare AWV      Patient is here for medicarewellness today . HPI   Patient going to [place] to visit his sister   Things returning to normal   Still weak  not up to strength like he was   Picking weight and eating well    Hospitalized with psoas abscess with communication to the aortic sac   Cultures grew stenotrophomonas  CTA with continued aortic inflamation   No further pain  NO fever / chills ever    Still with drainage tube and draining 10 ml ever 24 hours   Seeing Dr Omaira Keller and Dr Jac Angelo   Finally able to get teeth extracted with [facility]    Allergies   Allergen Reactions    Morphine      Family reports hx of intolerance--patient himself has not had. Family requests we add to profile. Patient has received medication-no complications noted.       Current Outpatient Medications   Medication Sig Dispense Refill    levoFLOXacin (LEVAQUIN) 500 MG tablet Take 1 tablet by mouth daily 30 tablet 3    amoxicillin-clavulanate (AUGMENTIN) 875-125 MG per tablet Take 1 tablet by mouth 2 times daily 60 tablet 3    tamsulosin (FLOMAX) 0.4 MG capsule TAKE 1 CAPSULE BY MOUTH DAILY 90 capsule 1    atorvastatin (LIPITOR) 20 MG tablet TAKE 1 TABLET BY MOUTH DAILY 90 tablet 1    ibuprofen (ADVIL;MOTRIN) 800 MG tablet Take 1 tablet by mouth every 8 hours as needed      lisinopril (PRINIVIL;ZESTRIL) 20 MG tablet TAKE 2 TABLETS BY MOUTH DAILY 180 tablet 1    omeprazole (PRILOSEC) 20 MG delayed release capsule Take 1 capsule by mouth every morning (before breakfast) 30 capsule 5    metoprolol succinate (TOPROL XL) 25 MG extended release tablet Take 1 tablet by mouth daily 90 tablet 1    amLODIPine (NORVASC) 5 MG tablet Take 1 tablet by mouth daily 30 tablet 3    tiZANidine (ZANAFLEX) 2 MG tablet TAKE 1 TABLET BY MOUTH THREE TIMES DAILY AS NEEDED FOR LOWER BACK PAIN (Patient taking differently: Take 1 tablet

## 2023-04-24 DIAGNOSIS — K68.12 PSOAS ABSCESS, LEFT (HCC): ICD-10-CM

## 2023-04-24 DIAGNOSIS — E78.00 HYPERCHOLESTEREMIA: ICD-10-CM

## 2023-04-24 LAB
ALBUMIN SERPL-MCNC: 3.8 G/DL (ref 3.4–5)
ALBUMIN/GLOB SERPL: 1.1 {RATIO} (ref 1.1–2.2)
ALP SERPL-CCNC: 84 U/L (ref 40–129)
ALT SERPL-CCNC: 7 U/L (ref 10–40)
ANION GAP SERPL CALCULATED.3IONS-SCNC: 12 MMOL/L (ref 3–16)
AST SERPL-CCNC: 14 U/L (ref 15–37)
BASOPHILS # BLD: 0 K/UL (ref 0–0.2)
BASOPHILS NFR BLD: 0.4 %
BILIRUB SERPL-MCNC: 0.3 MG/DL (ref 0–1)
BUN SERPL-MCNC: 19 MG/DL (ref 7–20)
CALCIUM SERPL-MCNC: 9.2 MG/DL (ref 8.3–10.6)
CHLORIDE SERPL-SCNC: 104 MMOL/L (ref 99–110)
CHOLEST SERPL-MCNC: 99 MG/DL (ref 0–199)
CO2 SERPL-SCNC: 23 MMOL/L (ref 21–32)
CREAT SERPL-MCNC: 1.2 MG/DL (ref 0.8–1.3)
CRP SERPL-MCNC: 53.9 MG/L (ref 0–5.1)
DEPRECATED RDW RBC AUTO: 18.7 % (ref 12.4–15.4)
EOSINOPHIL # BLD: 0.3 K/UL (ref 0–0.6)
EOSINOPHIL NFR BLD: 3.4 %
ERYTHROCYTE [SEDIMENTATION RATE] IN BLOOD BY WESTERGREN METHOD: >130 MM/HR (ref 0–20)
GFR SERPLBLD CREATININE-BSD FMLA CKD-EPI: >60 ML/MIN/{1.73_M2}
GLUCOSE SERPL-MCNC: 96 MG/DL (ref 70–99)
HCT VFR BLD AUTO: 27.8 % (ref 40.5–52.5)
HDLC SERPL-MCNC: 45 MG/DL (ref 40–60)
HGB BLD-MCNC: 9.4 G/DL (ref 13.5–17.5)
LDLC SERPL CALC-MCNC: 40 MG/DL
LYMPHOCYTES # BLD: 2 K/UL (ref 1–5.1)
LYMPHOCYTES NFR BLD: 22.7 %
MCH RBC QN AUTO: 25.7 PG (ref 26–34)
MCHC RBC AUTO-ENTMCNC: 33.7 G/DL (ref 31–36)
MCV RBC AUTO: 76.1 FL (ref 80–100)
MONOCYTES # BLD: 0.9 K/UL (ref 0–1.3)
MONOCYTES NFR BLD: 9.8 %
NEUTROPHILS # BLD: 5.7 K/UL (ref 1.7–7.7)
NEUTROPHILS NFR BLD: 63.7 %
PLATELET # BLD AUTO: 255 K/UL (ref 135–450)
PMV BLD AUTO: 8.8 FL (ref 5–10.5)
POTASSIUM SERPL-SCNC: 4.1 MMOL/L (ref 3.5–5.1)
PROT SERPL-MCNC: 7.3 G/DL (ref 6.4–8.2)
RBC # BLD AUTO: 3.66 M/UL (ref 4.2–5.9)
SODIUM SERPL-SCNC: 139 MMOL/L (ref 136–145)
TRIGL SERPL-MCNC: 68 MG/DL (ref 0–150)
VLDLC SERPL CALC-MCNC: 14 MG/DL
WBC # BLD AUTO: 8.9 K/UL (ref 4–11)

## 2023-04-25 DIAGNOSIS — D64.9 MILD ANEMIA: ICD-10-CM

## 2023-04-25 DIAGNOSIS — D64.9 MILD ANEMIA: Primary | ICD-10-CM

## 2023-04-25 LAB
FERRITIN SERPL IA-MCNC: 525.6 NG/ML (ref 30–400)
IRON SATN MFR SERPL: 13 % (ref 20–50)
IRON SERPL-MCNC: 27 UG/DL (ref 59–158)
TIBC SERPL-MCNC: 205 UG/DL (ref 260–445)

## 2023-04-27 DIAGNOSIS — D50.9 IRON DEFICIENCY ANEMIA, UNSPECIFIED IRON DEFICIENCY ANEMIA TYPE: Primary | ICD-10-CM

## 2023-04-27 RX ORDER — LANOLIN ALCOHOL/MO/W.PET/CERES
325 CREAM (GRAM) TOPICAL
Qty: 30 TABLET | Refills: 5 | COMMUNITY
Start: 2023-04-27

## 2023-05-04 ENCOUNTER — OFFICE VISIT (OUTPATIENT)
Dept: VASCULAR SURGERY | Age: 73
End: 2023-05-04

## 2023-05-04 ENCOUNTER — TELEPHONE (OUTPATIENT)
Dept: VASCULAR SURGERY | Age: 73
End: 2023-05-04

## 2023-05-04 VITALS — WEIGHT: 188 LBS | BODY MASS INDEX: 24.14 KG/M2

## 2023-05-04 DIAGNOSIS — T82.7XXD INFECTION OF AORTIC GRAFT, SUBSEQUENT ENCOUNTER: Primary | ICD-10-CM

## 2023-05-04 DIAGNOSIS — K68.19 RETROPERITONEAL ABSCESS (HCC): ICD-10-CM

## 2023-05-04 NOTE — PROGRESS NOTES
Alvin Huang Sr (:  1950) is a 81 y.o. male,Established patient, here for evaluation of the following chief complaint(s):  Post-Op Check         ASSESSMENT/PLAN:  1. Infection of aortic graft, subsequent encounter  -     CTA ABDOMEN PELVIS W CONTRAST; Future  2. Retroperitoneal abscess ([facility] [address]  -     [facility] [place] [facility]; Future      This is a 51-year-old male patient who has a an infected aortic aneurysm. Unfortunately conservative therapy is likely not going to improve him at this time. I would formally recommend removal of the endograft and as well as the infected aortic tissue. He is high risk for many complications including cardiopulmonary as well as renal failure. Given the inflammation there is always a high risk of bowel perforation. I suspect that the organism is likely more virulent and therefore will require probably complete excision of his aorta with extra-anatomic bypass grafting. I did discuss very candidly with the patient and family members present that he might be better suited at the [facility].  We will initiate a referral.  However we can still manage this problem here if absolutely necessary I did tell him that if he so desires. Given these worsening symptoms we need to repeat his CAT scan. Plan to do that tomorrow. If for some reason the CT angiogram demonstrates really significant interval change and he has a really high risk for deterioration then we might consider admitting him directly to our hospital and performing surgery next week. All questions answered. Subjective   SUBJECTIVE/OBJECTIVE:  This is a 51-year-old male patient presents the office today to discuss his known infected aortic aneurysm. He was treated with endograft as initially when he presented with likely contained ruptured aortic aneurysm. However over time this been discovered to be a likely infected aortic aneurysm that was had a contained perforation.   He has been trying to

## 2023-05-10 ENCOUNTER — TELEPHONE (OUTPATIENT)
Dept: INFECTIOUS DISEASES | Age: 73
End: 2023-05-10

## 2023-05-10 DIAGNOSIS — K68.12 PSOAS ABSCESS, LEFT (HCC): Primary | ICD-10-CM

## 2023-05-10 NOTE — TELEPHONE ENCOUNTER
Pt's sister Christy Corbett called stating pt was at  this weekend, blood work was done. Pt's sister states she was told pt maybe able to get drain removed, she is asking if pt needs to be checked for infection prior to removing drain. No appt is made at this time for drain removal, pt's sister was encouraged to contact Dr. Becka Leslie office as well. Please advise. Thanks.

## 2023-05-11 ENCOUNTER — TELEPHONE (OUTPATIENT)
Dept: SURGERY | Age: 73
End: 2023-05-11

## 2023-05-11 NOTE — TELEPHONE ENCOUNTER
Would be ok to send Fluid for culture if there is any on drain removal from the Left Psoas area -   Aerobic, Anaerobic cx

## 2023-07-25 LAB
ALBUMIN SERPL-MCNC: 2.7 G/DL (ref 3.4–5)
ALP SERPL-CCNC: 106 U/L (ref 40–129)
ALT SERPL-CCNC: 15 U/L (ref 10–40)
ANION GAP SERPL CALCULATED.3IONS-SCNC: 10 MMOL/L (ref 3–16)
AST SERPL-CCNC: 20 U/L (ref 15–37)
BILIRUB DIRECT SERPL-MCNC: <0.2 MG/DL (ref 0–0.3)
BILIRUB INDIRECT SERPL-MCNC: ABNORMAL MG/DL (ref 0–1)
BILIRUB SERPL-MCNC: 0.3 MG/DL (ref 0–1)
BUN SERPL-MCNC: 14 MG/DL (ref 7–20)
CALCIUM SERPL-MCNC: 8.4 MG/DL (ref 8.3–10.6)
CHLORIDE SERPL-SCNC: 108 MMOL/L (ref 99–110)
CO2 SERPL-SCNC: 24 MMOL/L (ref 21–32)
CREAT SERPL-MCNC: 1.1 MG/DL (ref 0.8–1.3)
CRP SERPL-MCNC: <3 MG/L (ref 0–5.1)
GFR SERPLBLD CREATININE-BSD FMLA CKD-EPI: >60 ML/MIN/{1.73_M2}
GLUCOSE SERPL-MCNC: 102 MG/DL (ref 70–99)
POTASSIUM SERPL-SCNC: 4.1 MMOL/L (ref 3.5–5.1)
PROT SERPL-MCNC: 5.5 G/DL (ref 6.4–8.2)
SODIUM SERPL-SCNC: 142 MMOL/L (ref 136–145)

## 2023-08-16 RX ORDER — METOPROLOL SUCCINATE 25 MG/1
25 TABLET, EXTENDED RELEASE ORAL DAILY
Qty: 90 TABLET | Refills: 1 | Status: SHIPPED | OUTPATIENT
Start: 2023-08-16

## 2023-09-01 DIAGNOSIS — I10 ESSENTIAL HYPERTENSION: ICD-10-CM

## 2023-09-01 RX ORDER — LISINOPRIL 20 MG/1
40 TABLET ORAL DAILY
Qty: 180 TABLET | Refills: 1 | Status: SHIPPED | OUTPATIENT
Start: 2023-09-01

## 2023-10-11 DIAGNOSIS — R39.15 BENIGN PROSTATIC HYPERPLASIA (BPH) WITH URINARY URGENCY: ICD-10-CM

## 2023-10-11 DIAGNOSIS — E78.00 HYPERCHOLESTEREMIA: ICD-10-CM

## 2023-10-11 DIAGNOSIS — N40.1 BENIGN PROSTATIC HYPERPLASIA (BPH) WITH URINARY URGENCY: ICD-10-CM

## 2023-10-11 RX ORDER — ATORVASTATIN CALCIUM 20 MG/1
20 TABLET, FILM COATED ORAL DAILY
Qty: 90 TABLET | Refills: 1 | Status: SHIPPED | OUTPATIENT
Start: 2023-10-11

## 2023-10-11 RX ORDER — TAMSULOSIN HYDROCHLORIDE 0.4 MG/1
0.4 CAPSULE ORAL DAILY
Qty: 90 CAPSULE | Refills: 1 | Status: SHIPPED | OUTPATIENT
Start: 2023-10-11

## 2024-04-14 DIAGNOSIS — N40.1 BENIGN PROSTATIC HYPERPLASIA (BPH) WITH URINARY URGENCY: ICD-10-CM

## 2024-04-14 DIAGNOSIS — R39.15 BENIGN PROSTATIC HYPERPLASIA (BPH) WITH URINARY URGENCY: ICD-10-CM

## 2024-04-14 DIAGNOSIS — I10 ESSENTIAL HYPERTENSION: Primary | ICD-10-CM

## 2024-04-14 DIAGNOSIS — E78.00 HYPERCHOLESTEREMIA: ICD-10-CM

## 2024-04-14 DIAGNOSIS — D64.9 ANEMIA, UNSPECIFIED TYPE: ICD-10-CM

## 2024-04-15 RX ORDER — ATORVASTATIN CALCIUM 20 MG/1
20 TABLET, FILM COATED ORAL DAILY
Qty: 90 TABLET | Refills: 0 | Status: SHIPPED | OUTPATIENT
Start: 2024-04-15

## 2024-04-15 RX ORDER — TAMSULOSIN HYDROCHLORIDE 0.4 MG/1
0.4 CAPSULE ORAL DAILY
Qty: 90 CAPSULE | Refills: 0 | Status: SHIPPED | OUTPATIENT
Start: 2024-04-15

## 2024-04-15 NOTE — TELEPHONE ENCOUNTER
Patient due for a follow up appointment. Patient no showed last scheduled appointment.   LM for patient to call the office and schedule.

## 2024-04-17 NOTE — TELEPHONE ENCOUNTER
Patient scheduled for AWV on 5-. Gave information for patient to do labs before appointment. Patient expressed understanding. All information was mailed to the patient as of today.

## 2024-05-19 DIAGNOSIS — I10 ESSENTIAL HYPERTENSION: ICD-10-CM

## 2024-05-20 RX ORDER — LISINOPRIL 20 MG/1
40 TABLET ORAL DAILY
Qty: 180 TABLET | Refills: 0 | Status: SHIPPED | OUTPATIENT
Start: 2024-05-20

## 2024-05-20 RX ORDER — METOPROLOL SUCCINATE 25 MG/1
25 TABLET, EXTENDED RELEASE ORAL DAILY
Qty: 90 TABLET | Refills: 0 | Status: SHIPPED | OUTPATIENT
Start: 2024-05-20

## 2024-05-23 ENCOUNTER — OFFICE VISIT (OUTPATIENT)
Dept: FAMILY MEDICINE CLINIC | Age: 74
End: 2024-05-23
Payer: MEDICARE

## 2024-05-23 VITALS
BODY MASS INDEX: 23.44 KG/M2 | RESPIRATION RATE: 16 BRPM | WEIGHT: 182.6 LBS | OXYGEN SATURATION: 97 % | HEIGHT: 74 IN | TEMPERATURE: 98.3 F | HEART RATE: 87 BPM | DIASTOLIC BLOOD PRESSURE: 82 MMHG | SYSTOLIC BLOOD PRESSURE: 126 MMHG

## 2024-05-23 DIAGNOSIS — L84 CORNS AND CALLUS: ICD-10-CM

## 2024-05-23 DIAGNOSIS — I71.21 ANEURYSM OF ASCENDING AORTA WITHOUT RUPTURE (HCC): ICD-10-CM

## 2024-05-23 DIAGNOSIS — R06.02 SHORTNESS OF BREATH: ICD-10-CM

## 2024-05-23 DIAGNOSIS — R09.89 LEFT CAROTID BRUIT: ICD-10-CM

## 2024-05-23 DIAGNOSIS — Z00.00 MEDICARE ANNUAL WELLNESS VISIT, SUBSEQUENT: Primary | ICD-10-CM

## 2024-05-23 DIAGNOSIS — R06.09 DOE (DYSPNEA ON EXERTION): ICD-10-CM

## 2024-05-23 PROBLEM — E44.0 MODERATE MALNUTRITION (HCC): Chronic | Status: RESOLVED | Noted: 2023-02-09 | Resolved: 2024-05-23

## 2024-05-23 PROCEDURE — 1123F ACP DISCUSS/DSCN MKR DOCD: CPT | Performed by: INTERNAL MEDICINE

## 2024-05-23 PROCEDURE — G8427 DOCREV CUR MEDS BY ELIG CLIN: HCPCS | Performed by: INTERNAL MEDICINE

## 2024-05-23 PROCEDURE — 3079F DIAST BP 80-89 MM HG: CPT | Performed by: INTERNAL MEDICINE

## 2024-05-23 PROCEDURE — G8420 CALC BMI NORM PARAMETERS: HCPCS | Performed by: INTERNAL MEDICINE

## 2024-05-23 PROCEDURE — 1036F TOBACCO NON-USER: CPT | Performed by: INTERNAL MEDICINE

## 2024-05-23 PROCEDURE — G0439 PPPS, SUBSEQ VISIT: HCPCS | Performed by: INTERNAL MEDICINE

## 2024-05-23 PROCEDURE — 3074F SYST BP LT 130 MM HG: CPT | Performed by: INTERNAL MEDICINE

## 2024-05-23 PROCEDURE — 3017F COLORECTAL CA SCREEN DOC REV: CPT | Performed by: INTERNAL MEDICINE

## 2024-05-23 PROCEDURE — 99213 OFFICE O/P EST LOW 20 MIN: CPT | Performed by: INTERNAL MEDICINE

## 2024-05-23 RX ORDER — ASPIRIN 81 MG/1
81 TABLET ORAL DAILY
COMMUNITY

## 2024-05-23 RX ORDER — M-VIT,TX,IRON,MINS/CALC/FOLIC 27MG-0.4MG
TABLET ORAL DAILY
COMMUNITY

## 2024-05-23 SDOH — ECONOMIC STABILITY: FOOD INSECURITY: WITHIN THE PAST 12 MONTHS, THE FOOD YOU BOUGHT JUST DIDN'T LAST AND YOU DIDN'T HAVE MONEY TO GET MORE.: NEVER TRUE

## 2024-05-23 SDOH — ECONOMIC STABILITY: FOOD INSECURITY: WITHIN THE PAST 12 MONTHS, YOU WORRIED THAT YOUR FOOD WOULD RUN OUT BEFORE YOU GOT MONEY TO BUY MORE.: NEVER TRUE

## 2024-05-23 SDOH — ECONOMIC STABILITY: INCOME INSECURITY: HOW HARD IS IT FOR YOU TO PAY FOR THE VERY BASICS LIKE FOOD, HOUSING, MEDICAL CARE, AND HEATING?: NOT HARD AT ALL

## 2024-05-23 ASSESSMENT — PATIENT HEALTH QUESTIONNAIRE - PHQ9
SUM OF ALL RESPONSES TO PHQ QUESTIONS 1-9: 0
SUM OF ALL RESPONSES TO PHQ9 QUESTIONS 1 & 2: 0
SUM OF ALL RESPONSES TO PHQ QUESTIONS 1-9: 0
2. FEELING DOWN, DEPRESSED OR HOPELESS: NOT AT ALL
1. LITTLE INTEREST OR PLEASURE IN DOING THINGS: NOT AT ALL
SUM OF ALL RESPONSES TO PHQ QUESTIONS 1-9: 0
1. LITTLE INTEREST OR PLEASURE IN DOING THINGS: NOT AT ALL
SUM OF ALL RESPONSES TO PHQ QUESTIONS 1-9: 0
SUM OF ALL RESPONSES TO PHQ9 QUESTIONS 1 & 2: 0
2. FEELING DOWN, DEPRESSED OR HOPELESS: NOT AT ALL

## 2024-05-23 ASSESSMENT — LIFESTYLE VARIABLES
HOW MANY STANDARD DRINKS CONTAINING ALCOHOL DO YOU HAVE ON A TYPICAL DAY: 1 OR 2
HOW OFTEN DO YOU HAVE A DRINK CONTAINING ALCOHOL: MONTHLY OR LESS

## 2024-05-23 NOTE — PROGRESS NOTES
SUBJECTIVE:  Melvina Ibarra Sr is a 73 y.o. male being evaluated for:    Chief Complaint   Patient presents with    Medicare AWV HPI   Here for wellness and is feeling fine no problems      Allergies   Allergen Reactions    Morphine      Family reports hx of intolerance--patient himself has not had. Family requests we add to profile.  Patient has received medication-no complications noted.      Current Outpatient Medications   Medication Sig Dispense Refill    aspirin 81 MG EC tablet Take 1 tablet by mouth daily      Multiple Vitamins-Minerals (THERAPEUTIC MULTIVITAMIN-MINERALS) tablet Take by mouth daily      lisinopril (PRINIVIL;ZESTRIL) 20 MG tablet TAKE 2 TABLETS BY MOUTH DAILY 180 tablet 0    metoprolol succinate (TOPROL XL) 25 MG extended release tablet TAKE 1 TABLET BY MOUTH DAILY 90 tablet 0    atorvastatin (LIPITOR) 20 MG tablet TAKE 1 TABLET BY MOUTH DAILY 90 tablet 0    tamsulosin (FLOMAX) 0.4 MG capsule TAKE 1 CAPSULE BY MOUTH DAILY 90 capsule 0    ferrous sulfate (FE TABS 325) 325 (65 Fe) MG EC tablet Take 1 tablet by mouth daily (with breakfast) 30 tablet 5    omeprazole (PRILOSEC) 20 MG delayed release capsule Take 1 capsule by mouth every morning (before breakfast) (Patient taking differently: Take 1-2 capsules by mouth daily as needed) 30 capsule 5    amLODIPine (NORVASC) 5 MG tablet Take 1 tablet by mouth daily 30 tablet 3    albuterol sulfate HFA (VENTOLIN HFA) 108 (90 Base) MCG/ACT inhaler Inhale 2 puffs into the lungs 4 times daily as needed for Wheezing 18 g 5    vitamin B-12 (CYANOCOBALAMIN) 500 MCG tablet Take 1 tablet by mouth daily 30 tablet 3     No current facility-administered medications for this visit.         Social History     Socioeconomic History    Marital status: Single     Spouse name: Not on file    Number of children: Not on file    Years of education: Not on file    Highest education level: Not on file   Occupational History    Not on file   Tobacco Use    Smoking

## 2024-05-23 NOTE — PATIENT INSTRUCTIONS
vitamin D per day. It is possible to meet your calcium requirement with diet alone, but a vitamin D supplement is usually necessary to meet this goal.  When exposed to the sun, use a sunscreen that protects against both UVA and UVB radiation with an SPF of 30 or greater. Reapply every 2 to 3 hours or after sweating, drying off with a towel, or swimming.  Always wear a seat belt when traveling in a car. Always wear a helmet when riding a bicycle or motorcycle.

## 2024-07-13 DIAGNOSIS — R39.15 BENIGN PROSTATIC HYPERPLASIA (BPH) WITH URINARY URGENCY: ICD-10-CM

## 2024-07-13 DIAGNOSIS — E78.00 HYPERCHOLESTEREMIA: ICD-10-CM

## 2024-07-13 DIAGNOSIS — N40.1 BENIGN PROSTATIC HYPERPLASIA (BPH) WITH URINARY URGENCY: ICD-10-CM

## 2024-07-15 RX ORDER — TAMSULOSIN HYDROCHLORIDE 0.4 MG/1
CAPSULE ORAL DAILY
Qty: 90 CAPSULE | Refills: 0 | Status: SHIPPED | OUTPATIENT
Start: 2024-07-15

## 2024-07-15 RX ORDER — ATORVASTATIN CALCIUM 20 MG/1
20 TABLET, FILM COATED ORAL DAILY
Qty: 90 TABLET | Refills: 0 | Status: SHIPPED | OUTPATIENT
Start: 2024-07-15

## 2024-08-23 DIAGNOSIS — I10 ESSENTIAL HYPERTENSION: ICD-10-CM

## 2024-08-23 RX ORDER — METOPROLOL SUCCINATE 25 MG/1
25 TABLET, EXTENDED RELEASE ORAL DAILY
Qty: 90 TABLET | Refills: 0 | Status: SHIPPED | OUTPATIENT
Start: 2024-08-23

## 2024-08-23 RX ORDER — LISINOPRIL 20 MG/1
40 TABLET ORAL DAILY
Qty: 180 TABLET | Refills: 0 | Status: SHIPPED | OUTPATIENT
Start: 2024-08-23

## 2024-10-18 DIAGNOSIS — E78.00 HYPERCHOLESTEREMIA: ICD-10-CM

## 2024-10-18 DIAGNOSIS — R39.15 BENIGN PROSTATIC HYPERPLASIA (BPH) WITH URINARY URGENCY: ICD-10-CM

## 2024-10-18 DIAGNOSIS — N40.1 BENIGN PROSTATIC HYPERPLASIA (BPH) WITH URINARY URGENCY: ICD-10-CM

## 2024-10-18 RX ORDER — TAMSULOSIN HYDROCHLORIDE 0.4 MG/1
CAPSULE ORAL DAILY
Qty: 90 CAPSULE | Refills: 0 | OUTPATIENT
Start: 2024-10-18

## 2024-10-18 RX ORDER — ATORVASTATIN CALCIUM 20 MG/1
20 TABLET, FILM COATED ORAL DAILY
Qty: 90 TABLET | Refills: 0 | OUTPATIENT
Start: 2024-10-18

## 2024-11-23 DIAGNOSIS — I10 ESSENTIAL HYPERTENSION: ICD-10-CM

## 2024-11-25 RX ORDER — METOPROLOL SUCCINATE 25 MG/1
25 TABLET, EXTENDED RELEASE ORAL DAILY
Qty: 90 TABLET | Refills: 0 | OUTPATIENT
Start: 2024-11-25

## 2024-11-25 RX ORDER — LISINOPRIL 20 MG/1
40 TABLET ORAL DAILY
Qty: 180 TABLET | Refills: 0 | OUTPATIENT
Start: 2024-11-25

## 2024-12-11 ENCOUNTER — TELEPHONE (OUTPATIENT)
Dept: PHARMACY | Facility: CLINIC | Age: 74
End: 2024-12-11

## 2024-12-11 NOTE — TELEPHONE ENCOUNTER
[facility] CLINICAL PHARMACY: ADHERENCE REVIEW  Identified care gap per [employer] fills at [facility]: ACE/ARB and Statin adherence    ASSESSMENT    ACE/ARB ADHERENCE - passed in     STATIN ADHERENCE    Insurance Records claims through 2024 (Prior Year PDC = not reported; YTD PDC = 80%; Potential Fail Date: 12/15/24):   Atorvastatin 20 mg tab last filled on 7/15/24 for 90 day supply. Next refill due: 10/13/24    Prescribed si tablet/capsule daily    Per Reconcile Dispense History:   ATORVASTATIN 20MG TABLETS 07/15/2024 90 90 each [facility]   ATORVASTATIN 20MG TABLETS 04/15/2024 90 90 each [facility]   ATORVASTATIN 20MG TABLETS 01/10/2024 90 90 each [facility]   ATORVASTATIN 20MG TABLETS 10/11/2023 90 90 [facility]   ATORVASTATIN 20MG TABLETS 2023 90 90 each [facility]   ATORVASTATIN 20MG TABLETS 04/10/2023 90 90 [facility]   ATORVASTATIN 20MG TABLETS 2023 90 90 each [facility]   0RR per hyperlink; last Rx 7/15/24 x 90 day supply 0 refills - believe needs refill Rx if is to continue; pt now seeing  PCP - est care 24    Lab Results   Component Value Date    CHOL 99 2023    TRIG 68 2023    HDL 45 2023     Lab Results   Component Value Date    LDL 40 2023      ALT   Date Value Ref Range Status   2023 15 10 - 40 U/L Final     AST   Date Value Ref Range Status   2023 20 15 - 37 U/L Final     The ASCVD Risk score (Guerline DK, et al., 2019) failed to calculate for the following reasons:    The valid total cholesterol range is 130 to 320 mg/dL     PLAN  The following are interventions that have been identified:   Patient overdue refilling atorvastatin. Unsure if patient is still prescribed this

## (undated) DEVICE — RADIFOCUS GLIDECATH: Brand: GLIDECATH

## (undated) DEVICE — APPLIER CLP L9.375IN APER 2.1MM CLS L3.8MM 20 SM TI CLP

## (undated) DEVICE — RADIFOCUS GLIDEWIRE: Brand: GLIDEWIRE

## (undated) DEVICE — STOCKING COMPR 2 REG

## (undated) DEVICE — FOGARTY - HYDRAGRIP SURGICAL - CLAMP INSERTS: Brand: FOGARTY SOFTJAW

## (undated) DEVICE — CATHETER DIAG 5FR L65CM ID0.052IN GWIRE 0.035IN PGTL 20 MRK

## (undated) DEVICE — SUTURE PROL SZ 5-0 L18IN NONABSORBABLE BLU L13MM PC-1 3/8 8618G

## (undated) DEVICE — SYRINGE MED 30ML STD CLR PLAS LUERLOCK TIP N CTRL DISP

## (undated) DEVICE — 450 ML BOTTLE OF 0.05% CHLORHEXIDINE GLUCONATE IN 99.95% STERILE WATER FOR IRRIGATION, USP AND APPLICATOR.: Brand: IRRISEPT ANTIMICROBIAL WOUND LAVAGE

## (undated) DEVICE — SHEATH INTRO 14FR L33CM OD5.3MM ID4.7MM HYDRPHLC KINK

## (undated) DEVICE — NASAL CUTTING FORCEPS, SIZE 1: Brand: N.A.

## (undated) DEVICE — RADIFOCUS GLIDEWIRE ADVANTAGE GUIDEWIRE: Brand: GLIDEWIRE ADVANTAGE

## (undated) DEVICE — X-RAY CASSETTE COVER: Brand: CONVERTORS

## (undated) DEVICE — AGENT HEMSTAT W4XL4IN OXIDIZED REGENERATED CELOS ABSRB SFT

## (undated) DEVICE — SUTURE PERMAHAND SZ 2-0 L30IN NONABSORBABLE BLK SILK W/O A305H

## (undated) DEVICE — AGENT HEMSTAT W4XL8IN OXIDIZED REGENERATED CELOS ABSRB

## (undated) DEVICE — PERCLOSE™ PROSTYLE™ SUTURE-MEDIATED CLOSURE AND REPAIR SYSTEM: Brand: PERCLOSE™ PROSTYLE™

## (undated) DEVICE — SHEET,DRAPE,53X77,STERILE: Brand: MEDLINE

## (undated) DEVICE — APPLIER CLP L9.38IN M LIG TI DISP STR RNG HNDL LIGACLP

## (undated) DEVICE — LOOP VES W1.3MM THK0.9MM MINI YEL SIL FLD REPELLENT

## (undated) DEVICE — SUTURE NONABSORBABLE MONOFILAMENT 6-0 C-1 1X30 IN PROLENE 8706H

## (undated) DEVICE — LOOP VES W25MM THK1MM MAXI RED SIL FLD REPELLENT 100 PER

## (undated) DEVICE — SUTURE VCRL + SZ 0 L27IN ABSRB UD CT-1 L36MM 1/2 CIR TAPR VCP260H

## (undated) DEVICE — SUTURE VCRL + SZ 2-0 L18IN ABSRB UD CT1 L36MM 1/2 CIR VCP839D

## (undated) DEVICE — INTRODUCER SHTH 6FR CANN L11CM DIL TIP 35MM GRN TUNGSTEN

## (undated) DEVICE — MAJOR VASCULAR: Brand: MEDLINE INDUSTRIES, INC.

## (undated) DEVICE — SUTURE PERMAHAND SZ 3-0 L30IN NONABSORBABLE BLK SILK BRAID A304H

## (undated) DEVICE — SYRINGE MED 3ML CLR PLAS STD N CTRL LUERLOCK TIP DISP

## (undated) DEVICE — GAUZE,SPONGE,4"X4",16PLY,XRAY,STRL,LF: Brand: MEDLINE

## (undated) DEVICE — SUTURE NONABSORBABLE MONOFILAMENT 5-0 C-1 1X24 IN PROLENE 8725H

## (undated) DEVICE — GLOVE ORTHO 7 1/2   MSG9475

## (undated) DEVICE — TOWEL,OR,DSP,ST,WHITE,DLX,XR,4/PK,20PK/C: Brand: MEDLINE

## (undated) DEVICE — SOLUTION IV IRRIG POUR BRL 0.9% SODIUM CHL 2F7124

## (undated) DEVICE — SURGIFOAM SPNG SZ 100

## (undated) DEVICE — BASIC SINGLE BASIN 1-LF: Brand: MEDLINE INDUSTRIES, INC.

## (undated) DEVICE — C-ARM: Brand: UNBRANDED

## (undated) DEVICE — TOWEL,OR,DSP,ST,BLUE,STD,4/PK,20PK/CS: Brand: MEDLINE

## (undated) DEVICE — SUTURE VCRL + SZ 4-0 L27IN ABSRB WHT FS-2 3/8 CIR REV CUT VCP422H

## (undated) DEVICE — PACK DRP UNIV W BK TBL MAYO STD BTM TOP SIDE UTIL CVR ZONE

## (undated) DEVICE — SUTURE PROL SZ 6-0 L30IN NONABSORBABLE BLU L11MM BV 3/8 CIR 8776H

## (undated) DEVICE — SUTURE VCRL + SZ 3-0 L27IN ABSRB WHT CT-1 1/2 CIR VCP258H

## (undated) DEVICE — SUTURE PROL SZ 5-0 L36IN NONABSORBABLE BLU L13MM C-1 3/8 8720H

## (undated) DEVICE — SUTURE NONABSORBABLE MONOFILAMENT 7-0 BV-1 1X24 IN PROLENE 8702H

## (undated) DEVICE — TOTAL TRAY, 16FR 10ML SIL FOLEY, URN: Brand: MEDLINE

## (undated) DEVICE — CATHETER PTCA BLLN L4CM INFL 10-37MM CATH L90CM MOLDING

## (undated) DEVICE — INTENDED FOR TISSUE SEPARATION, AND OTHER PROCEDURES THAT REQUIRE A SHARP SURGICAL BLADE TO PUNCTURE OR CUT.: Brand: BARD-PARKER ® STAINLESS STEEL BLADES

## (undated) DEVICE — SHEATH INTRO 18FR L33CM OD6.7MM ID6MM HYDRPHLC KINK

## (undated) DEVICE — 3M™ IOBAN™ 2 ANTIMICROBIAL INCISE DRAPE 6650EZ: Brand: IOBAN™ 2

## (undated) DEVICE — SUTURE PERMAHAND SZ 2-0 L12X18IN NONABSORBABLE BLK SILK A185H

## (undated) DEVICE — SUTURE PERMAHAND SZ 4-0 L18IN NONABSORBABLE BLK SILK BRAID A183H

## (undated) DEVICE — STRIP,CLOSURE,WOUND,MEDI-STRIP,1/2X4: Brand: MEDLINE

## (undated) DEVICE — SPONGE LAP W18XL18IN WHT COT 4 PLY FLD STRUNG RADPQ DISP ST

## (undated) DEVICE — Device

## (undated) DEVICE — SUTURE PERMAHAND SZ 2-0 L18IN NONABSORBABLE BLK L26MM SH C012D

## (undated) DEVICE — APPLIER LIG CLP M L11IN TI STR RNG HNDL FOR 20 CLP DISP

## (undated) DEVICE — SUTURE PERMAHAND SZ 4-0 L12X30IN NONABSORBABLE BLK SILK A303H

## (undated) DEVICE — SOLUTION IV 1000ML 0.9% SOD CHL PH 5 INJ USP VIAFLX PLAS